# Patient Record
Sex: FEMALE | Race: WHITE | NOT HISPANIC OR LATINO | Employment: FULL TIME | URBAN - NONMETROPOLITAN AREA
[De-identification: names, ages, dates, MRNs, and addresses within clinical notes are randomized per-mention and may not be internally consistent; named-entity substitution may affect disease eponyms.]

---
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
## 2022-01-11 ENCOUNTER — OFFICE VISIT (OUTPATIENT)
Dept: FAMILY MEDICINE | Facility: OTHER | Age: 22
End: 2022-01-11
Attending: FAMILY MEDICINE
Payer: COMMERCIAL

## 2022-01-11 VITALS
WEIGHT: 171.4 LBS | SYSTOLIC BLOOD PRESSURE: 124 MMHG | OXYGEN SATURATION: 99 % | HEART RATE: 81 BPM | TEMPERATURE: 98.4 F | DIASTOLIC BLOOD PRESSURE: 80 MMHG | RESPIRATION RATE: 16 BRPM | BODY MASS INDEX: 28.74 KG/M2

## 2022-01-11 DIAGNOSIS — L65.9 HAIR LOSS: Primary | ICD-10-CM

## 2022-01-11 DIAGNOSIS — R06.2 WHEEZING: ICD-10-CM

## 2022-01-11 LAB
FERRITIN SERPL-MCNC: 51 NG/ML (ref 24–336)
TSH SERPL DL<=0.005 MIU/L-ACNC: 1.42 MU/L (ref 0.4–4)

## 2022-01-11 PROCEDURE — 36415 COLL VENOUS BLD VENIPUNCTURE: CPT | Mod: ZL | Performed by: FAMILY MEDICINE

## 2022-01-11 PROCEDURE — 99213 OFFICE O/P EST LOW 20 MIN: CPT | Performed by: FAMILY MEDICINE

## 2022-01-11 PROCEDURE — 82728 ASSAY OF FERRITIN: CPT | Mod: ZL | Performed by: FAMILY MEDICINE

## 2022-01-11 PROCEDURE — 84443 ASSAY THYROID STIM HORMONE: CPT | Mod: ZL | Performed by: FAMILY MEDICINE

## 2022-01-11 RX ORDER — FLUTICASONE PROPIONATE 110 UG/1
1 AEROSOL, METERED RESPIRATORY (INHALATION) 2 TIMES DAILY
Qty: 12 G | Refills: 0 | Status: SHIPPED | OUTPATIENT
Start: 2022-01-11 | End: 2022-10-24

## 2022-01-11 ASSESSMENT — ANXIETY QUESTIONNAIRES
7. FEELING AFRAID AS IF SOMETHING AWFUL MIGHT HAPPEN: NOT AT ALL
GAD7 TOTAL SCORE: 11
GAD7 TOTAL SCORE: 11
7. FEELING AFRAID AS IF SOMETHING AWFUL MIGHT HAPPEN: NOT AT ALL
1. FEELING NERVOUS, ANXIOUS, OR ON EDGE: NEARLY EVERY DAY
3. WORRYING TOO MUCH ABOUT DIFFERENT THINGS: NEARLY EVERY DAY
2. NOT BEING ABLE TO STOP OR CONTROL WORRYING: NEARLY EVERY DAY
4. TROUBLE RELAXING: NOT AT ALL
5. BEING SO RESTLESS THAT IT IS HARD TO SIT STILL: NOT AT ALL
6. BECOMING EASILY ANNOYED OR IRRITABLE: MORE THAN HALF THE DAYS
GAD7 TOTAL SCORE: 11

## 2022-01-11 ASSESSMENT — PATIENT HEALTH QUESTIONNAIRE - PHQ9
10. IF YOU CHECKED OFF ANY PROBLEMS, HOW DIFFICULT HAVE THESE PROBLEMS MADE IT FOR YOU TO DO YOUR WORK, TAKE CARE OF THINGS AT HOME, OR GET ALONG WITH OTHER PEOPLE: SOMEWHAT DIFFICULT
SUM OF ALL RESPONSES TO PHQ QUESTIONS 1-9: 11
SUM OF ALL RESPONSES TO PHQ QUESTIONS 1-9: 11

## 2022-01-11 ASSESSMENT — PAIN SCALES - GENERAL: PAINLEVEL: NO PAIN (0)

## 2022-01-11 NOTE — LETTER
January 11, 2022      Lyn Ny  [id number] [address] [address]        To Whom It May Concern:    Lyn Ny was seen in our clinic. Patient had influenza-like illness on 1/3-1/10.    She was not tested for covid as she is within 90 day window of positive covid test.    She may return to work without restrictions now that she is asymptomatic.      Sincerely,        Tonya Dobbs MD

## 2022-01-11 NOTE — PROGRESS NOTES
SUBJECTIVE:   Lyn Ny is a 21 year old female who presents to clinic today for the following health issues:  Nursing Notes:   Kayleen Irving LPN  1/11/2022  3:20 PM  Signed  Patient is needing a notes to return to work, states was sick last week. She also states her inhaler doesn't seem to be working. Feeling her hair has been falling out, would like to discuss thyroid. states for a couple months has been feeling nauseous, and vomiting at times.      Patient's last menstrual period was 12/27/2021 (approximate).  Medication Reconciliation: complete    FOOD SECURITY SCREENING QUESTIONS  Hunger Vital Signs:  Within the past 12 months we worried whether our food would run out before we got money to buy more. Never  Within the past 12 months the food we bought just didn't last and we didn't have money to get more. Never  Kayleen Irving LPN 1/11/2022 2:49 PM       Advance care directive on file? no  Advance care directive provided to patient? no       Kayleen Irving LPN        HPI  22 yo female presents with a few concerns.    Last week had fever, chills, n/v and diarrhea. Symptoms have resolved.    Covid positive on 11/19./2021, recovered but does not think her albuterol is working anymore    Hair loss and fatigue. Thought it was related to stress.     Patient is unvaccinated and does not plan to get a vaccine in the near future.        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       OBJECTIVE:     /80   Pulse 81   Temp 98.4  F (36.9  C) (Tympanic)   Resp 16   Wt 77.7 kg (171 lb 6.4 oz)   LMP 12/27/2021 (Approximate)   SpO2 99%    Breastfeeding No   BMI 28.74 kg/m    Body mass index is 28.74 kg/m .  Physical Exam  Cardiovascular:      Rate and Rhythm: Normal rate.   Pulmonary:      Effort: Pulmonary effort is normal. No respiratory distress.   Musculoskeletal:      Cervical back: Normal range of motion.   Skin:     General: Skin is warm.      Findings: No rash.   Neurological:      Mental Status: She is alert.         Diagnostic Test Results:  Results for orders placed or performed in visit on 01/11/22   Ferritin     Status: Normal   Result Value Ref Range    Ferritin 51 24 - 336 ng/mL   TSH Reflex GH     Status: Normal   Result Value Ref Range    TSH 1.42 0.40 - 4.00 mU/L       ASSESSMENT/PLAN:         (L65.9) Hair loss  (primary encounter diagnosis)  Comment: May be hormonal.  Ferritin and TSH are normal.  Plan: Ferritin, TSH Reflex GH            (R06.2) Wheezing  Comment:   Plan: fluticasone (FLOVENT HFA) 110 MCG/ACT inhaler        Patient misunderstood instructions on proper use of inhaled steroids.  Recommend resuming Flovent 1 puff twice daily.  This would be a chronic medication that she uses seasonally or at the onset of respiratory illnesses.  May continue albuterol as needed.    Strongly encouraged vaccination which she declines        Tonya Dobbs MD  [facility]  Answers for HPI/ROS submitted by the patient on 1/11/2022  If you checked off any problems, how difficult have these problems made it for you to do your work, take care of things at home, or get along with other people?: Somewhat difficult  PHQ9 TOTAL SCORE: 11  RAHAT 7 TOTAL SCORE: 11

## 2022-01-11 NOTE — NURSING NOTE
Patient is needing a notes to return to work, states was sick last week. She also states her inhaler doesn't seem to be working. Feeling her hair has been falling out, would like to discuss thyroid. states for a couple months has been feeling nauseous, and vomiting at times.      Patient's last menstrual period was 12/27/2021 (approximate).  Medication Reconciliation: complete    FOOD SECURITY SCREENING QUESTIONS  Hunger Vital Signs:  Within the past 12 months we worried whether our food would run out before we got money to buy more. Never  Within the past 12 months the food we bought just didn't last and we didn't have money to get more. Never  Kayleen Irving LPN 1/11/2022 2:49 PM       Advance care directive on file? no  Advance care directive provided to patient? no       Kayleen Irving LPN

## 2022-01-12 ASSESSMENT — ANXIETY QUESTIONNAIRES: GAD7 TOTAL SCORE: 11

## 2022-01-12 ASSESSMENT — PATIENT HEALTH QUESTIONNAIRE - PHQ9: SUM OF ALL RESPONSES TO PHQ QUESTIONS 1-9: 11

## 2022-01-25 ENCOUNTER — TELEPHONE (OUTPATIENT)
Dept: FAMILY MEDICINE | Facility: OTHER | Age: 22
End: 2022-01-25
Payer: COMMERCIAL

## 2022-01-25 DIAGNOSIS — L65.9 HAIR LOSS: Primary | ICD-10-CM

## 2022-01-25 NOTE — TELEPHONE ENCOUNTER
Pt would like to get a referral to [facility] dermatologist.  Please call.    Yovanny Young on 1/25/2022 at 2:40 PM

## 2022-01-25 NOTE — TELEPHONE ENCOUNTER
Called and verified patient full name and . Patient would like referral to [facility] for her hair loss.     Ronel Conner LPN on 2022 at 2:57 PM

## 2022-07-08 ENCOUNTER — ALLIED HEALTH/NURSE VISIT (OUTPATIENT)
Dept: FAMILY MEDICINE | Facility: OTHER | Age: 22
End: 2022-07-08
Attending: FAMILY MEDICINE
Payer: COMMERCIAL

## 2022-07-08 DIAGNOSIS — J02.9 SORE THROAT: Primary | ICD-10-CM

## 2022-07-08 DIAGNOSIS — R05.9 COUGH: ICD-10-CM

## 2022-07-08 DIAGNOSIS — Z20.822 EXPOSURE TO 2019 NOVEL CORONAVIRUS: ICD-10-CM

## 2022-07-08 DIAGNOSIS — R51.9 HEADACHE, UNSPECIFIED: ICD-10-CM

## 2022-07-08 DIAGNOSIS — R11.0 NAUSEA: ICD-10-CM

## 2022-07-08 PROCEDURE — U0005 INFEC AGEN DETEC AMPLI PROBE: HCPCS | Mod: ZL

## 2022-07-08 PROCEDURE — C9803 HOPD COVID-19 SPEC COLLECT: HCPCS

## 2022-07-08 NOTE — PROGRESS NOTES
Patient here for Covid Testing. Exposure, cough, headache, nausea and sore throat.     Willow Parr MA on 7/8/2022 at 8:31 AM

## 2022-07-09 LAB — SARS-COV-2 RNA RESP QL NAA+PROBE: NEGATIVE

## 2022-09-17 ENCOUNTER — HEALTH MAINTENANCE LETTER (OUTPATIENT)
Age: 22
End: 2022-09-17

## 2022-10-24 ENCOUNTER — OFFICE VISIT (OUTPATIENT)
Dept: FAMILY MEDICINE | Facility: OTHER | Age: 22
End: 2022-10-24
Attending: FAMILY MEDICINE
Payer: COMMERCIAL

## 2022-10-24 VITALS
TEMPERATURE: 98 F | SYSTOLIC BLOOD PRESSURE: 118 MMHG | WEIGHT: 193.4 LBS | DIASTOLIC BLOOD PRESSURE: 80 MMHG | OXYGEN SATURATION: 96 % | RESPIRATION RATE: 16 BRPM | HEART RATE: 80 BPM | HEIGHT: 64 IN | BODY MASS INDEX: 33.02 KG/M2

## 2022-10-24 DIAGNOSIS — F41.9 ANXIETY AND DEPRESSION: ICD-10-CM

## 2022-10-24 DIAGNOSIS — Z00.00 HEALTH CARE MAINTENANCE: Primary | ICD-10-CM

## 2022-10-24 DIAGNOSIS — Z13.220 SCREENING FOR LIPID DISORDERS: ICD-10-CM

## 2022-10-24 DIAGNOSIS — Z23 NEED FOR HPV VACCINATION: ICD-10-CM

## 2022-10-24 DIAGNOSIS — Z11.4 SCREENING FOR HIV (HUMAN IMMUNODEFICIENCY VIRUS): ICD-10-CM

## 2022-10-24 DIAGNOSIS — Z30.011 ENCOUNTER FOR INITIAL PRESCRIPTION OF CONTRACEPTIVE PILLS: ICD-10-CM

## 2022-10-24 DIAGNOSIS — Z11.59 NEED FOR HEPATITIS C SCREENING TEST: ICD-10-CM

## 2022-10-24 DIAGNOSIS — Z11.8 SPECIAL SCREENING EXAMINATION FOR CHLAMYDIAL DISEASE: ICD-10-CM

## 2022-10-24 DIAGNOSIS — F32.A ANXIETY AND DEPRESSION: ICD-10-CM

## 2022-10-24 DIAGNOSIS — J45.30 MILD PERSISTENT ASTHMA WITHOUT COMPLICATION: ICD-10-CM

## 2022-10-24 LAB
C TRACH DNA SPEC QL PROBE+SIG AMP: NEGATIVE
CHOLEST SERPL-MCNC: 220 MG/DL
FASTING STATUS PATIENT QL REPORTED: ABNORMAL
HDLC SERPL-MCNC: 49 MG/DL (ref 23–92)
LDLC SERPL CALC-MCNC: 141 MG/DL
N GONORRHOEA DNA SPEC QL NAA+PROBE: NEGATIVE
NONHDLC SERPL-MCNC: 171 MG/DL
TRIGL SERPL-MCNC: 150 MG/DL

## 2022-10-24 PROCEDURE — 87491 CHLMYD TRACH DNA AMP PROBE: CPT | Mod: ZL | Performed by: FAMILY MEDICINE

## 2022-10-24 PROCEDURE — 90651 9VHPV VACCINE 2/3 DOSE IM: CPT | Performed by: FAMILY MEDICINE

## 2022-10-24 PROCEDURE — 80061 LIPID PANEL: CPT | Mod: ZL | Performed by: FAMILY MEDICINE

## 2022-10-24 PROCEDURE — 90471 IMMUNIZATION ADMIN: CPT | Performed by: FAMILY MEDICINE

## 2022-10-24 PROCEDURE — 87591 N.GONORRHOEAE DNA AMP PROB: CPT | Mod: ZL | Performed by: FAMILY MEDICINE

## 2022-10-24 PROCEDURE — 86803 HEPATITIS C AB TEST: CPT | Mod: ZL | Performed by: FAMILY MEDICINE

## 2022-10-24 PROCEDURE — 36415 COLL VENOUS BLD VENIPUNCTURE: CPT | Mod: ZL | Performed by: FAMILY MEDICINE

## 2022-10-24 PROCEDURE — 87389 HIV-1 AG W/HIV-1&-2 AB AG IA: CPT | Mod: ZL | Performed by: FAMILY MEDICINE

## 2022-10-24 PROCEDURE — 99395 PREV VISIT EST AGE 18-39: CPT | Mod: 25 | Performed by: FAMILY MEDICINE

## 2022-10-24 RX ORDER — FLUOXETINE 10 MG/1
10 CAPSULE ORAL DAILY
Qty: 7 CAPSULE | Refills: 0 | Status: SHIPPED | OUTPATIENT
Start: 2022-10-24 | End: 2022-11-08

## 2022-10-24 ASSESSMENT — ENCOUNTER SYMPTOMS
ABDOMINAL PAIN: 0
NERVOUS/ANXIOUS: 1
PALPITATIONS: 0
PARESTHESIAS: 0
FREQUENCY: 0
WEAKNESS: 0
JOINT SWELLING: 0
HEMATURIA: 0
EYE PAIN: 0
CHILLS: 0
DIZZINESS: 0
HEMATOCHEZIA: 0
NAUSEA: 1
MYALGIAS: 0
DIARRHEA: 0
CONSTIPATION: 0
HEADACHES: 1
SORE THROAT: 0
BREAST MASS: 0
DYSURIA: 0
FEVER: 0
ARTHRALGIAS: 0
HEARTBURN: 0
COUGH: 0
SHORTNESS OF BREATH: 0

## 2022-10-24 ASSESSMENT — ANXIETY QUESTIONNAIRES
6. BECOMING EASILY ANNOYED OR IRRITABLE: NEARLY EVERY DAY
7. FEELING AFRAID AS IF SOMETHING AWFUL MIGHT HAPPEN: MORE THAN HALF THE DAYS
1. FEELING NERVOUS, ANXIOUS, OR ON EDGE: MORE THAN HALF THE DAYS
GAD7 TOTAL SCORE: 16
3. WORRYING TOO MUCH ABOUT DIFFERENT THINGS: NEARLY EVERY DAY
5. BEING SO RESTLESS THAT IT IS HARD TO SIT STILL: SEVERAL DAYS
2. NOT BEING ABLE TO STOP OR CONTROL WORRYING: NEARLY EVERY DAY
IF YOU CHECKED OFF ANY PROBLEMS ON THIS QUESTIONNAIRE, HOW DIFFICULT HAVE THESE PROBLEMS MADE IT FOR YOU TO DO YOUR WORK, TAKE CARE OF THINGS AT HOME, OR GET ALONG WITH OTHER PEOPLE: VERY DIFFICULT
GAD7 TOTAL SCORE: 16

## 2022-10-24 ASSESSMENT — ASTHMA QUESTIONNAIRES: ACT_TOTALSCORE: 19

## 2022-10-24 ASSESSMENT — PATIENT HEALTH QUESTIONNAIRE - PHQ9
5. POOR APPETITE OR OVEREATING: MORE THAN HALF THE DAYS
SUM OF ALL RESPONSES TO PHQ QUESTIONS 1-9: 12

## 2022-10-24 ASSESSMENT — PAIN SCALES - GENERAL: PAINLEVEL: NO PAIN (0)

## 2022-10-24 NOTE — LETTER
My Asthma Action Plan    Name: Lyn Ny   YOB: 2000  Date: 10/24/2022   My doctor: Narcisa Steve MD   My clinic: [facility] in [place]        My Rescue Medicine:   Albuterol inhaler (Proair/Ventolin/Proventil HFA)  2-4 puffs EVERY 4 HOURS as needed. Use a spacer if recommended by your provider.   My Asthma Severity:   Intermittent / Exercise Induced  Know your asthma triggers:              GREEN ZONE   Good Control    I feel good    No cough or wheeze    Can work, sleep and play without asthma symptoms       Take your asthma control medicine every day.     1. If exercise triggers your asthma, take your rescue medication    15 minutes before exercise or sports, and    During exercise if you have asthma symptoms  2. Spacer to use with inhaler: If you have a spacer, make sure to use it with your inhaler             YELLOW ZONE Getting Worse  I have ANY of these:    I do not feel good    Cough or wheeze    Chest feels tight    Wake up at night   1. Keep taking your Green Zone medications  2. Start taking your rescue medicine:    every 20 minutes for up to 1 hour. Then every 4 hours for 24-48 hours.  3. If you stay in the Yellow Zone for more than 12-24 hours, contact your doctor.  4. If you do not return to the Green Zone in 12-24 hours or you get worse, start taking your oral steroid medicine if prescribed by your provider.           RED ZONE Medical Alert - Get Help  I have ANY of these:    I feel awful    Medicine is not helping    Breathing getting harder    Trouble walking or talking    Nose opens wide to breathe       1. Take your rescue medicine NOW  2. If your provider has prescribed an oral steroid medicine, start taking it NOW  3. Call your doctor NOW  4. If you are still in the Red Zone after 20 minutes and you have not reached your doctor:    Take your rescue medicine again and    Call 911 or go to the emergency room right away    See your regular doctor within 2  weeks of an Emergency Room or Urgent Care visit for follow-up treatment.          Annual Reminders:  Meet with Asthma Educator,  Flu Shot in the Fall, consider Pneumonia Vaccination for patients with asthma (aged 19 and older).    Pharmacy: [facility] [id number] [place] [address]    Electronically signed by Narcisa Steve MD   Date: 10/24/22                    Asthma Triggers  How To Control Things That Make Your Asthma Worse    Triggers are things that make your asthma worse.  Look at the list below to help you find your triggers and   what you can do about them. You can help prevent asthma flare-ups by staying away from your triggers.      Trigger                                                          What you can do   Cigarette Smoke  Tobacco smoke can make asthma worse. Do not allow smoking in your home, car or around you.  Be sure no one smokes at a child s day care or school.  If you smoke, ask your health care provider for ways to help you quit.  Ask family members to quit too.  Ask your health care provider for a referral to Quit Plan to help you quit smoking, or call 2-818-495-PLAN.     Colds, Flu, Bronchitis  These are common triggers of asthma. Wash your hands often.  Don t touch your eyes, nose or mouth.  Get a flu shot every year.     Dust Mites  These are tiny bugs that live in cloth or carpet. They are too small to see. Wash sheets and blankets in hot water every week.   Encase pillows and mattress in dust mite proof covers.  Avoid having carpet if you can. If you have carpet, vacuum weekly.   Use a dust mask and HEPA vacuum.   Pollen and Outdoor Mold  Some people are allergic to trees, grass, or weed pollen, or molds. Try to keep your windows closed.  Limit time out doors when pollen count is high.   Ask you health care provider about taking medicine during allergy season.     Animal Dander  Some people are allergic to skin flakes, urine or saliva from pets with fur or  feathers. Keep pets with fur or feathers out of your home.    If you can t keep the pet outdoors, then keep the pet out of your bedroom.  Keep the bedroom door closed.  Keep pets off cloth furniture and away from stuffed toys.     Mice, Rats, and Cockroaches  Some people are allergic to the waste from these pests.   Cover food and garbage.  Clean up spills and food crumbs.  Store grease in the refrigerator.   Keep food out of the bedroom.   Indoor Mold  This can be a trigger if your home has high moisture. Fix leaking faucets, pipes, or other sources of water.   Clean moldy surfaces.  Dehumidify basement if it is damp and smelly.   Smoke, Strong Odors, and Sprays  These can reduce air quality. Stay away from strong odors and sprays, such as perfume, powder, hair spray, paints, smoke incense, paint, cleaning products, candles and new carpet.   Exercise or Sports  Some people with asthma have this trigger. Be active!  Ask your doctor about taking medicine before sports or exercise to prevent symptoms.    Warm up for 5-10 minutes before and after sports or exercise.     Other Triggers of Asthma  Cold air:  Cover your nose and mouth with a scarf.  Sometimes laughing or crying can be a trigger.  Some medicines and food can trigger asthma.

## 2022-10-24 NOTE — PROGRESS NOTES
Nursing Notes:   Ronel Conner LPN  10/24/2022  8:52 AM  Sign at exiting of workspace  Chief Complaint   Patient presents with     Physical     Is fasting. No questions or concerns.     Medication Reconciliation: complete    Ronel Conner LPN       SUBJECTIVE:   CC: Lyn is an 22 year old who presents for preventive health visit.     Patient has been advised of split billing requirements and indicates understanding: Yes     Healthy Habits:     Getting at least 3 servings of Calcium per day:  Yes    Bi-annual eye exam:  Yes    Dental care twice a year:  Yes    Sleep apnea or symptoms of sleep apnea:  None    Diet:  Regular (no restrictions)    Frequency of exercise:  None    Taking medications regularly:  Yes    Medication side effects:  None    PHQ-2 Total Score: 2    Additional concerns today:  Yes     ASTHMA HISTORY    Frequency of daytime symptoms:  Twice a week.  Frequency of nighttime symptoms:  0, needs to take albuterol before bed.  How many albuterol puffs per week: 14.  Takes albuterol before bed every night.    How often are you missing out on activities:  Never.  Triggers:  Allergies, dust, smoke.  How do you try to avoid triggers: avoid outside activities when allergies are worse and avoids smoke.  Medications:  alb   Inhaler technique:  good    Current control:  She feels it is well controlled.    Barriers to treatment:  none  ER visits/Hospitalizations in past year?  no  Recent need for steroid treatment?  no  Asthma Action Plan up to date?  Yes, 10/24/22   Tobacco use?   no  Interested in cessation?  n/a    ACT Total Scores 10/24/2022   ACT TOTAL SCORE (Goal Greater than or Equal to 20) 19   In the past 12 months, how many times did you visit the emergency room for your asthma without being admitted to the hospital? 0   In the past 12 months, how many times were you hospitalized overnight because of your asthma? 0       ARHAT-7 SCORE 1/11/2022 10/24/2022   Total Score 11 (moderate anxiety) -    Total Score 11 16       PHQ 8/12/2019 1/11/2022 10/24/2022   PHQ-9 Total Score - 11 12   Q9: Thoughts of better off dead/self-harm past 2 weeks - Not at all Not at all   PHQ-A Total Score 0 - -   PHQ-A Depressed most days in past year No - -   PHQ-A Mood affect on daily activities Not difficult at all - -   PHQ-A Suicide Ideation past 2 weeks Not at all - -   PHQ-A Suicide Ideation past month No - -   PHQ-A Previous suicide attempt No - -       She needs a refill of her oral contraceptives.      She also has been having more difficulty with anxiety and depression for the past couple of months.  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  She is more irritable than normal, more anxious.    Depression and Anxiety Follow-Up    How are you doing with your depression since your last visit? Worsened     How are you doing with your anxiety since your last visit?  Worsened     Are you having other symptoms that might be associated with depression or anxiety? Yes    Have you had a significant life event? No     Do you have any concerns with your use of alcohol or other drugs? No     Denies suicidal ideation.    Social History     Tobacco Use     Smoking status: Never     Smokeless tobacco: Never     Tobacco comments:     Quit smoking: Mom smokes outside   Vaping Use     Vaping Use: Never used   Substance Use Topics     Alcohol use: No     Drug use: Never     Comment: Drug use: No     PHQ 8/12/2019 1/11/2022 10/24/2022   PHQ-9 Total Score - 11 12   Q9: Thoughts of better off dead/self-harm past 2 weeks - Not at all Not at all   PHQ-A Total Score 0 - -   PHQ-A Depressed most days in past year No - -   PHQ-A Mood affect on daily activities Not difficult at all - -   PHQ-A Suicide Ideation past 2 weeks Not at all - -   PHQ-A Suicide Ideation past month No - -   PHQ-A Previous suicide attempt No - -     RAHAT-7 SCORE 1/11/2022 10/24/2022   Total Score 11 (moderate anxiety) -    Total Score 11 16         Suicide Assessment Five-step Evaluation and Treatment (SAFE-T)      Today's PHQ-2 Score:   PHQ-2 ( 1999 Pf[employer]) 10/24/2022   Q1: Little interest or pleasure in doing things 1   Q2: Feeling down, depressed or hopeless 1   PHQ-2 Score 2   PHQ-2 Total Score (12-17 Years)- Positive if 3 or more points; Administer PHQ-A if positive -   Q1: Little interest or pleasure in doing things Several days   Q2: Feeling down, depressed or hopeless Several days   PHQ-2 Score 2       Abuse: Current or Past (Physical, Sexual or Emotional) - No  Do you feel safe in your environment? Yes    Have you ever done Advance Care Planning? (For example, a Health Directive, POLST, or a discussion with a medical provider or your loved ones about your wishes): No, advance care planning information given to patient to review.  Patient declined advance care planning discussion at this time.    Social History     Tobacco Use     Smoking status: Never     Smokeless tobacco: Never     Tobacco comments:     Quit smoking: Mom smokes outside   Substance Use Topics     Alcohol use: No     If you drink alcohol do you typically have >3 drinks per day or >7 drinks per week? No    Alcohol Use 10/24/2022   Prescreen: >3 drinks/day or >7 drinks/week? Not Applicable   Prescreen: >3 drinks/day or >7 drinks/week? -       Reviewed orders with patient.  Reviewed health maintenance and updated orders accordingly - Yes  BP Readings from Last 3 Encounters:   10/24/22 118/80   01/11/22 124/80   09/10/21 114/76    Wt Readings from Last 3 Encounters:   10/24/22 87.7 kg (193 lb 6.4 oz)   01/11/22 77.7 kg (171 lb 6.4 oz) (93 %, Z= 1.44)*   09/10/21 84.4 kg (186 lb) (96 %, Z= 1.74)*     * Growth percentiles are based on CDC (Girls, 2-20 Years) data.                  Patient Active Problem List   Diagnosis     Acne     Menorrhagia with irregular cycle     Spondylolysis of lumbar region     Mild persistent asthma without complication     Past  Surgical History:   Procedure Laterality Date     Lumbar fusion of L5  8/2/3017    subsequent infection       Social History     Tobacco Use     Smoking status: Never     Smokeless tobacco: Never     Tobacco comments:     Quit smoking: Mom smokes outside   Substance Use Topics     Alcohol use: No     Family History   Problem Relation Age of Onset     Hypertension Mother         Hypertension,Borderline     Hypertension Father         Hypertension     Hyperlipidemia Father         Hyperlipidemia     Hypertension Maternal Grandmother         Hypertension     Hypertension Maternal Grandfather         Hypertension     Hypertension Paternal Grandfather         Hypertension     Heart Disease Paternal Grandfather         Heart Disease,smoker         Current Outpatient Medications   Medication Sig Dispense Refill     acetaminophen (TYLENOL) 325 MG tablet Take 650 mg by mouth       albuterol (PROAIR HFA/PROVENTIL HFA/VENTOLIN HFA) 108 (90 Base) MCG/ACT inhaler INHALE 2 PUFFS INTO THE LUNGS EVERY 4 HOURS AS NEEDED FOR SHORTNESS OF BREATH / DYSPNEA OR WHEEZING 18 g 11     FLUoxetine (PROZAC) 10 MG capsule Take 1 capsule (10 mg) by mouth daily for 7 days , then increase to 20 mg daily. 7 capsule 0     FLUoxetine (PROZAC) 20 MG capsule Take 1 capsule (20 mg) by mouth daily 90 capsule 3     norgestrel-ethinyl estradiol (LO/OVRAL) 0.3-30 MG-MCG tablet Take 1 tablet by mouth daily 84 tablet 4     Allergies   Allergen Reactions     Azithromycin Other (See Comments)     Numbness in the right side, neurological changes.      Amoxicillin Nausea and Vomiting     Tolerates cephalexin     Rifampin Nausea and Vomiting     Hydrocodone Nausea     Vomiting       Breast Cancer Screening:        History of abnormal Pap smear: NO - under age 21, PAP not appropriate for age     Reviewed and updated as needed this visit by clinical staff   Tobacco  Allergies  Meds              Reviewed and updated as needed this visit by Provider                  Past Medical History:   Diagnosis Date     Spondylolysis of lumbar region     No Comments Provided      Past Surgical History:   Procedure Laterality Date     Lumbar fusion of L5  8/2/3017    subsequent infection       Review of Systems   Constitutional: Negative for chills and fever.   HENT: Negative for congestion, ear pain, hearing loss and sore throat.    Eyes: Negative for pain and visual disturbance.   Respiratory: Negative for cough and shortness of breath.    Cardiovascular: Negative for chest pain, palpitations and peripheral edema.   Gastrointestinal: Positive for nausea. Negative for abdominal pain, constipation, diarrhea, heartburn and hematochezia.   Breasts:  Positive for tenderness. Negative for breast mass and discharge.   Genitourinary: Positive for vaginal discharge. Negative for dysuria, frequency, genital sores, hematuria, pelvic pain, urgency and vaginal bleeding.   Musculoskeletal: Negative for arthralgias, joint swelling and myalgias.   Skin: Negative for rash.   Neurological: Positive for headaches. Negative for dizziness, weakness and paresthesias.   Psychiatric/Behavioral: Positive for mood changes. The patient is nervous/anxious.      CONSTITUTIONAL: NEGATIVE for fever, chills, change in weight  INTEGUMENTARU/SKIN: NEGATIVE for worrisome rashes, moles or lesions  EYES: NEGATIVE for vision changes or irritation  ENT: NEGATIVE for ear, mouth and throat problems  RESP: NEGATIVE for significant cough or SOB  BREAST: NEGATIVE for masses, tenderness or discharge  CV: NEGATIVE for chest pain, palpitations or peripheral edema  GI: NEGATIVE for nausea, abdominal pain, heartburn, or change in bowel habits  : NEGATIVE for unusual urinary or vaginal symptoms. Periods are regular.  MUSCULOSKELETAL: NEGATIVE for significant arthralgias or myalgia  NEURO: NEGATIVE for weakness, dizziness or paresthesias  PSYCHIATRIC: NEGATIVE for changes in mood or affect     OBJECTIVE:   /80   Pulse 80    "Temp 98  F (36.7  C) (Tympanic)   Resp 16   Ht 1.626 m (5' 4\")   Wt 87.7 kg (193 lb 6.4 oz)   LMP 10/10/2022 (Approximate)   SpO2 96%   Breastfeeding No   BMI 33.20 kg/m    Physical Exam  Constitutional:       General: She is not in acute distress.     Appearance: She is well-developed.   HENT:      Head: Normocephalic.      Right Ear: Tympanic membrane and external ear normal.      Left Ear: Tympanic membrane and external ear normal.      Nose: Nose normal.      Mouth/Throat:      Pharynx: No oropharyngeal exudate.   Eyes:      General:         Right eye: No discharge.         Left eye: No discharge.      Conjunctiva/sclera: Conjunctivae normal.      Pupils: Pupils are equal, round, and reactive to light.   Neck:      Thyroid: No thyromegaly.      Trachea: No tracheal deviation.   Cardiovascular:      Rate and Rhythm: Normal rate and regular rhythm.      Pulses: Normal pulses.      Heart sounds: Normal heart sounds, S1 normal and S2 normal. No murmur heard.    No friction rub. No gallop. No S3 or S4 sounds.   Pulmonary:      Effort: Pulmonary effort is normal. No respiratory distress.      Breath sounds: Normal breath sounds. No wheezing or rales.      Comments: Breast exam:  No masses palpable bilaterally.  No skin changes, tethering or axillary lymphadenopathy bilaterally.    Abdominal:      General: Bowel sounds are normal. There is no distension.      Palpations: Abdomen is soft. There is no mass.      Tenderness: There is no abdominal tenderness.   Genitourinary:     Comments: Pelvic/Rectal exams deferred per patient.  Musculoskeletal:         General: Normal range of motion.      Cervical back: Neck supple.   Lymphadenopathy:      Cervical: No cervical adenopathy.   Skin:     General: Skin is warm and dry.      Findings: No rash.   Neurological:      Mental Status: She is alert and oriented to person, place, and time.      Motor: No abnormal muscle tone.      Deep Tendon Reflexes: Reflexes are normal " and symmetric.   Psychiatric:         Behavior: Behavior normal.         Thought Content: Thought content normal.         Judgment: Judgment normal.           ASSESSMENT/PLAN:       ICD-10-CM    1. Health care maintenance  Z00.00       2. Need for HPV vaccination  Z23 GH IMM-  HUMAN PAPILLOMA VIRUS (GARDASIL 9) VACCINE      3. Special screening examination for chlamydial disease  Z11.8 GC/Chlamydia by PCR     GC/Chlamydia by PCR      4. Screening for lipid disorders  Z13.220 Lipid Profile     Lipid Profile      5. Screening for HIV (human immunodeficiency virus)  Z11.4 HIV Antigen Antibody Combo     HIV Antigen Antibody Combo      6. Need for hepatitis C screening test  Z11.59 Hepatitis C Screen Reflex to HCV RNA Quant and Genotype     Hepatitis C Screen Reflex to HCV RNA Quant and Genotype      7. Encounter for initial prescription of contraceptive pills  Z30.011 norgestrel-ethinyl estradiol (LO/OVRAL) 0.3-30 MG-MCG tablet      8. Mild persistent asthma without complication  J45.30       9. Anxiety and depression  F41.9 FLUoxetine (PROZAC) 10 MG capsule    F32.A FLUoxetine (PROZAC) 20 MG capsule        1.  Gonorrhea/Chlamydia screening as well as HIV and hepatitis C screening completed today.  Tdap is up to date, last completed 8/8/14.  HPV vaccine completed today.  Flu and covid vaccines declined by patient.  2.  See #1.  3.  See #1.  4.  Lipid profile completed as well.  5.  See #1.  6.  See #1.  7.  Oral contraceptives refilled.  8.  Stable.  On albuterol only.  She feels that she is doing ok with just this and declines addition of any inhaled steroids at this time.  9.  Start on Fluoxetine 10 mg daily x 7 days, then increase to 20 mg daily.  Follow up in 1 month for follow up.    Patient has been advised of split billing requirements and indicates understanding: Yes      COUNSELING:  Reviewed preventive health counseling, as reflected in patient instructions       Regular exercise       Healthy diet/nutrition    "    Vision screening       Contraception       Safe sex practices/STD prevention       Consider Hep C screening for all patients one time for ages 18-79 years       HIV screeninx in teen years, 1x in adult years, and at intervals if high risk    Estimated body mass index is 33.2 kg/m  as calculated from the following:    Height as of this encounter: 1.626 m (5' 4\").    Weight as of this encounter: 87.7 kg (193 lb 6.4 oz).    Weight management plan: Discussed healthy diet and exercise guidelines    She reports that she has never smoked. She has never used smokeless tobacco.      Counseling Resources:  ATP IV Guidelines  Pooled Cohorts Equation Calculator  Breast Cancer Risk Calculator  BRCA-Related Cancer Risk Assessment: FHS-7 Tool  FRAX Risk Assessment  ICSI Preventive Guidelines  Dietary Guidelines for Americans, 2010  USDA's MyPlate  ASA Prophylaxis  Lung CA Screening    Narcisa Steve MD  [facility]  "

## 2022-10-24 NOTE — NURSING NOTE
Chief Complaint   Patient presents with     Physical     Is fasting. No questions or concerns.     Medication Reconciliation: complete    Ronel Conner LPN

## 2022-10-25 LAB
HCV AB SERPL QL IA: NONREACTIVE
HIV 1+2 AB+HIV1 P24 AG SERPL QL IA: NONREACTIVE

## 2022-11-08 ENCOUNTER — TELEPHONE (OUTPATIENT)
Dept: FAMILY MEDICINE | Facility: OTHER | Age: 22
End: 2022-11-08

## 2022-11-08 NOTE — TELEPHONE ENCOUNTER
Ok to stop fluoxetine.  I have a same day I could seen her in on Monday.  If she thinks she should be seen sooner, Joyce Bishop could see her in one of her same day appointments as well.  Narcisa Steve MD

## 2022-11-08 NOTE — TELEPHONE ENCOUNTER
CCA - Patient started new medication a few weeks ago.  She states it is making her feel worse an she does not think she can take it anymore.  Patient has a follow up scheduled for 11/21/2022, but she wanted to let you know that she cannot continue to take this medication until then.    Cathleen Ramirez on 11/8/2022 at 11:48 AM'

## 2022-11-09 ENCOUNTER — TELEPHONE (OUTPATIENT)
Dept: FAMILY MEDICINE | Facility: OTHER | Age: 22
End: 2022-11-09

## 2022-11-09 NOTE — TELEPHONE ENCOUNTER
Please call the patient.  She needs a note for work.  She missed a few days for being ill.      Nadine Back on 11/9/2022 at 9:39 AM

## 2022-11-09 NOTE — LETTER
November 10, 2022      Lyn Ny  [id number] [address]        To Whom It May Concern:    Lyn Ny was absent from work due to illness.  Please excuse for dates of 11/8, 11/9 and 11/10/22 due to illness.  She may return to work without restrictions.      Sincerely,        Narcisa Steve MD

## 2022-11-09 NOTE — TELEPHONE ENCOUNTER
Needs letter for work to states 11/8,11/9, 11/10 dates of missed work due to illness.   Current symptoms headache, Nausea & Vomiting, diarrhea, fatigue, dizziness, blurred vision. States when she tried to get out of bed she has to vomit.  Feels like it is due to a new medication that was given but work needs the info now.   Alina Dumont LPN ..........11/9/2022 4:37 PM

## 2022-11-10 ENCOUNTER — OFFICE VISIT (OUTPATIENT)
Dept: FAMILY MEDICINE | Facility: OTHER | Age: 22
End: 2022-11-10
Attending: PHYSICIAN ASSISTANT
Payer: COMMERCIAL

## 2022-11-10 VITALS
SYSTOLIC BLOOD PRESSURE: 118 MMHG | OXYGEN SATURATION: 95 % | DIASTOLIC BLOOD PRESSURE: 98 MMHG | TEMPERATURE: 96.5 F | HEART RATE: 95 BPM | BODY MASS INDEX: 32.44 KG/M2 | WEIGHT: 189 LBS | RESPIRATION RATE: 16 BRPM

## 2022-11-10 DIAGNOSIS — T50.905A MEDICATION REACTION, INITIAL ENCOUNTER: ICD-10-CM

## 2022-11-10 DIAGNOSIS — R11.2 NAUSEA AND VOMITING, UNSPECIFIED VOMITING TYPE: Primary | ICD-10-CM

## 2022-11-10 LAB
ALBUMIN UR-MCNC: NEGATIVE MG/DL
ANION GAP SERPL CALCULATED.3IONS-SCNC: 10 MMOL/L (ref 7–15)
APPEARANCE UR: CLEAR
BASOPHILS # BLD AUTO: 0.1 10E3/UL (ref 0–0.2)
BASOPHILS NFR BLD AUTO: 1 %
BILIRUB UR QL STRIP: NEGATIVE
BUN SERPL-MCNC: 12.1 MG/DL (ref 6–20)
CALCIUM SERPL-MCNC: 9.6 MG/DL (ref 8.6–10)
CHLORIDE SERPL-SCNC: 101 MMOL/L (ref 98–107)
COLOR UR AUTO: NORMAL
CREAT SERPL-MCNC: 0.71 MG/DL (ref 0.51–0.95)
DEPRECATED HCO3 PLAS-SCNC: 27 MMOL/L (ref 22–29)
EOSINOPHIL # BLD AUTO: 0.4 10E3/UL (ref 0–0.7)
EOSINOPHIL NFR BLD AUTO: 6 %
ERYTHROCYTE [DISTWIDTH] IN BLOOD BY AUTOMATED COUNT: 11.8 % (ref 10–15)
FLUAV RNA SPEC QL NAA+PROBE: NEGATIVE
FLUBV RNA RESP QL NAA+PROBE: NEGATIVE
GFR SERPL CREATININE-BSD FRML MDRD: >90 ML/MIN/1.73M2
GLUCOSE SERPL-MCNC: 83 MG/DL (ref 70–99)
GLUCOSE UR STRIP-MCNC: NEGATIVE MG/DL
HCG UR QL: NEGATIVE
HCT VFR BLD AUTO: 44.9 % (ref 35–47)
HGB BLD-MCNC: 15.5 G/DL (ref 11.7–15.7)
HGB UR QL STRIP: NEGATIVE
IMM GRANULOCYTES # BLD: 0 10E3/UL
IMM GRANULOCYTES NFR BLD: 0 %
KETONES UR STRIP-MCNC: NEGATIVE MG/DL
LEUKOCYTE ESTERASE UR QL STRIP: NEGATIVE
LYMPHOCYTES # BLD AUTO: 1.6 10E3/UL (ref 0.8–5.3)
LYMPHOCYTES NFR BLD AUTO: 26 %
MCH RBC QN AUTO: 30.3 PG (ref 26.5–33)
MCHC RBC AUTO-ENTMCNC: 34.5 G/DL (ref 31.5–36.5)
MCV RBC AUTO: 88 FL (ref 78–100)
MONOCYTES # BLD AUTO: 0.5 10E3/UL (ref 0–1.3)
MONOCYTES NFR BLD AUTO: 7 %
NEUTROPHILS # BLD AUTO: 3.8 10E3/UL (ref 1.6–8.3)
NEUTROPHILS NFR BLD AUTO: 60 %
NITRATE UR QL: NEGATIVE
NRBC # BLD AUTO: 0 10E3/UL
NRBC BLD AUTO-RTO: 0 /100
PH UR STRIP: 6.5 [PH] (ref 5–9)
PLATELET # BLD AUTO: 316 10E3/UL (ref 150–450)
POTASSIUM SERPL-SCNC: 4.2 MMOL/L (ref 3.4–5.3)
RBC # BLD AUTO: 5.11 10E6/UL (ref 3.8–5.2)
RSV RNA SPEC NAA+PROBE: NEGATIVE
SARS-COV-2 RNA RESP QL NAA+PROBE: NEGATIVE
SODIUM SERPL-SCNC: 138 MMOL/L (ref 136–145)
SP GR UR STRIP: 1.02 (ref 1–1.03)
UROBILINOGEN UR STRIP-MCNC: NORMAL MG/DL
WBC # BLD AUTO: 6.4 10E3/UL (ref 4–11)

## 2022-11-10 PROCEDURE — 36415 COLL VENOUS BLD VENIPUNCTURE: CPT | Mod: ZL | Performed by: PHYSICIAN ASSISTANT

## 2022-11-10 PROCEDURE — 250N000011 HC RX IP 250 OP 636: Performed by: PHYSICIAN ASSISTANT

## 2022-11-10 PROCEDURE — 85025 COMPLETE CBC W/AUTO DIFF WBC: CPT | Mod: ZL | Performed by: PHYSICIAN ASSISTANT

## 2022-11-10 PROCEDURE — C9803 HOPD COVID-19 SPEC COLLECT: HCPCS | Performed by: PHYSICIAN ASSISTANT

## 2022-11-10 PROCEDURE — 87637 SARSCOV2&INF A&B&RSV AMP PRB: CPT | Mod: ZL | Performed by: PHYSICIAN ASSISTANT

## 2022-11-10 PROCEDURE — 99214 OFFICE O/P EST MOD 30 MIN: CPT | Performed by: PHYSICIAN ASSISTANT

## 2022-11-10 PROCEDURE — 81003 URINALYSIS AUTO W/O SCOPE: CPT | Mod: ZL | Performed by: PHYSICIAN ASSISTANT

## 2022-11-10 PROCEDURE — 81025 URINE PREGNANCY TEST: CPT | Mod: ZL | Performed by: PHYSICIAN ASSISTANT

## 2022-11-10 PROCEDURE — 80048 BASIC METABOLIC PNL TOTAL CA: CPT | Mod: ZL | Performed by: PHYSICIAN ASSISTANT

## 2022-11-10 RX ORDER — ONDANSETRON 4 MG/1
4 TABLET, ORALLY DISINTEGRATING ORAL EVERY 6 HOURS PRN
Status: DISCONTINUED | OUTPATIENT
Start: 2022-11-10 | End: 2024-02-27

## 2022-11-10 RX ORDER — ONDANSETRON 4 MG/1
4 TABLET, ORALLY DISINTEGRATING ORAL EVERY 8 HOURS PRN
Qty: 30 TABLET | Refills: 0 | Status: SHIPPED | OUTPATIENT
Start: 2022-11-10 | End: 2024-07-01

## 2022-11-10 RX ADMIN — ONDANSETRON 4 MG: 4 TABLET, ORALLY DISINTEGRATING ORAL at 14:25

## 2022-11-10 ASSESSMENT — ANXIETY QUESTIONNAIRES
8. IF YOU CHECKED OFF ANY PROBLEMS, HOW DIFFICULT HAVE THESE MADE IT FOR YOU TO DO YOUR WORK, TAKE CARE OF THINGS AT HOME, OR GET ALONG WITH OTHER PEOPLE?: SOMEWHAT DIFFICULT
GAD7 TOTAL SCORE: 6
3. WORRYING TOO MUCH ABOUT DIFFERENT THINGS: SEVERAL DAYS
GAD7 TOTAL SCORE: 6
IF YOU CHECKED OFF ANY PROBLEMS ON THIS QUESTIONNAIRE, HOW DIFFICULT HAVE THESE PROBLEMS MADE IT FOR YOU TO DO YOUR WORK, TAKE CARE OF THINGS AT HOME, OR GET ALONG WITH OTHER PEOPLE: SOMEWHAT DIFFICULT
2. NOT BEING ABLE TO STOP OR CONTROL WORRYING: SEVERAL DAYS
1. FEELING NERVOUS, ANXIOUS, OR ON EDGE: SEVERAL DAYS
7. FEELING AFRAID AS IF SOMETHING AWFUL MIGHT HAPPEN: SEVERAL DAYS
8. IF YOU CHECKED OFF ANY PROBLEMS, HOW DIFFICULT HAVE THESE MADE IT FOR YOU TO DO YOUR WORK, TAKE CARE OF THINGS AT HOME, OR GET ALONG WITH OTHER PEOPLE?: SOMEWHAT DIFFICULT
IF YOU CHECKED OFF ANY PROBLEMS ON THIS QUESTIONNAIRE, HOW DIFFICULT HAVE THESE PROBLEMS MADE IT FOR YOU TO DO YOUR WORK, TAKE CARE OF THINGS AT HOME, OR GET ALONG WITH OTHER PEOPLE: SOMEWHAT DIFFICULT
4. TROUBLE RELAXING: NOT AT ALL
7. FEELING AFRAID AS IF SOMETHING AWFUL MIGHT HAPPEN: SEVERAL DAYS
6. BECOMING EASILY ANNOYED OR IRRITABLE: MORE THAN HALF THE DAYS
GAD7 TOTAL SCORE: 6
GAD7 TOTAL SCORE: 6
4. TROUBLE RELAXING: NOT AT ALL
GAD7 TOTAL SCORE: 6
5. BEING SO RESTLESS THAT IT IS HARD TO SIT STILL: NOT AT ALL
3. WORRYING TOO MUCH ABOUT DIFFERENT THINGS: SEVERAL DAYS
6. BECOMING EASILY ANNOYED OR IRRITABLE: MORE THAN HALF THE DAYS
5. BEING SO RESTLESS THAT IT IS HARD TO SIT STILL: NOT AT ALL
GAD7 TOTAL SCORE: 6
2. NOT BEING ABLE TO STOP OR CONTROL WORRYING: SEVERAL DAYS
1. FEELING NERVOUS, ANXIOUS, OR ON EDGE: SEVERAL DAYS

## 2022-11-10 ASSESSMENT — PATIENT HEALTH QUESTIONNAIRE - PHQ9
SUM OF ALL RESPONSES TO PHQ QUESTIONS 1-9: 13
10. IF YOU CHECKED OFF ANY PROBLEMS, HOW DIFFICULT HAVE THESE PROBLEMS MADE IT FOR YOU TO DO YOUR WORK, TAKE CARE OF THINGS AT HOME, OR GET ALONG WITH OTHER PEOPLE: SOMEWHAT DIFFICULT
10. IF YOU CHECKED OFF ANY PROBLEMS, HOW DIFFICULT HAVE THESE PROBLEMS MADE IT FOR YOU TO DO YOUR WORK, TAKE CARE OF THINGS AT HOME, OR GET ALONG WITH OTHER PEOPLE: SOMEWHAT DIFFICULT

## 2022-11-10 ASSESSMENT — PAIN SCALES - GENERAL: PAINLEVEL: SEVERE PAIN (6)

## 2022-11-10 NOTE — PATIENT INSTRUCTIONS
Encouraged increase fluids with electrolytes such as Gatorade and chicken broth.  Increase diet as tolerated.    Try small amounts of food and drink frequently. Offer a bland diet. Advance as tolerated. Avoid dairy products the first day. You may add yogurt tomorrow as the first dairy product.    Return to clinic or emergency room if symptoms are changing or worsening.    Try the BRAT diet (bread, bananas, rice, applesauce, tea, toast).  This is a very bland diet which should not irritate your colon.  Hold off on spicy foods, red sauces, mexican or chinese food.    Call or return to clinic as needed if your symptoms worsen or fail to improve as anticipated.     If the pain does not begin improving, localizes to the right lower belly, there is increased fever, or other progression of symptoms, return for reassessment.    Should I see a doctor or nurse about my stomach ache? -- Most people do not need to see a doctor or nurse for a stomach ache. But you should see your doctor or nurse if:  ?You have bloody bowel movements, diarrhea, or vomiting  ?Your pain is severe and lasts more than an hour or comes and goes for more than 24 hours  ?You cannot eat or drink for hours  ?You have a fever higher than 102 F (39 C)  ?You lose a lot of weight without trying to, or lose interest in food

## 2022-11-10 NOTE — NURSING NOTE
Pt presents to clinic today for a possible medication reaction to Prozac. States she stopped taking it 2 days ago because it was making her nauseous, blurred vision, dizziness, headaches and severe diarrhea.   States she currently has a headache and has no appetitive. Her mom was recently in the hospital because of a medication reaction to Prozac.     FOOD SECURITY SCREENING QUESTIONS:    The next two questions are to help us understand your food security.  If you are feeling you need any assistance in this area, we have resources available to support you today.    Hunger Vital Signs:  Within the past 12 months we worried whether our food would run out before we got money to buy more. Never  Within the past 12 months the food we bought just didn't last and we didn't have money to get more. Never            Medication Reconciliation: complete  Henry Fuentes LPN,LPN on 11/10/2022 at 2:00 PM

## 2022-11-10 NOTE — PROGRESS NOTES
Assessment & Plan   Problem List Items Addressed This Visit    None  Visit Diagnoses     Nausea and vomiting, unspecified vomiting type    -  Primary    Relevant Medications    ondansetron (ZOFRAN ODT) ODT tab 4 mg    ondansetron (ZOFRAN ODT) 4 MG ODT tab    Other Relevant Orders    CBC and Differential (Completed)    Basic Metabolic Panel (Completed)    UA reflex to Microscopic (Completed)    Symptomatic; Unknown Influenza A/B & SARS-CoV2 (COVID-19) Virus PCR Multiplex Nose (Completed)    Pregnancy, Urine (HCG) (Completed)    Medication reaction, initial encounter             Completed a thorough work-up.  Patient was given Zofran 4 mg ODT to help alleviate her symptoms.  Tolerated medication well.  No side effects noted.  Completed CBC, BMP, urinalysis, and UPT which are unremarkable.  Completed influenza, COVID-19, and RSV test which was negative.  Symptoms likely due to a reaction to the Prozac.  Highly stressed need to increase fluids with electrolytes and getting rest.  Patient was given a work note.  Given a prescription for Zofran 4 mg ODT to use as needed for symptomatic relief.  Return to clinic emergency room if symptoms are changing or worsening.  May need to go to the ER if symptoms are persistent or worsening for IV fluids if needed.  Vital signs are stable today.  Patient has close follow-up with her PCP on 11/14/2022.  Gave warning signs and symptoms.    30 minutes spent on the date of the encounter doing chart review, history and exam, documentation and further activities per the note       Depression Screening Follow Up    PHQ 11/10/2022   PHQ-9 Total Score 13   Q9: Thoughts of better off dead/self-harm past 2 weeks Not at all   PHQ-A Total Score -   PHQ-A Depressed most days in past year -   PHQ-A Mood affect on daily activities -   PHQ-A Suicide Ideation past 2 weeks -   PHQ-A Suicide Ideation past month -   PHQ-A Previous suicide attempt -     Last PHQ-9 11/10/2022   1.  Little interest or  "pleasure in doing things 2   2.  Feeling down, depressed, or hopeless 2   3.  Trouble falling or staying asleep, or sleeping too much 2   4.  Feeling tired or having little energy 2   5.  Poor appetite or overeating 2   6.  Feeling bad about yourself 1   7.  Trouble concentrating 2   8.  Moving slowly or restless 0   Q9: Thoughts of better off dead/self-harm past 2 weeks 0   PHQ-9 Total Score 13   Difficulty at work, home, or with people -       Follow Up Actions Taken  Referred patient back to PCP     See Patient Instructions    Return if symptoms worsen or fail to improve.    Nadine Bishop PA-C  [facility]    Anabel Nicholson is a 22 year old accompanied by her partner, presenting for the following health issues:  Medication Problem (Possible reaction)      History of Present Illness       Mental Health Follow-up:  Patient presents to follow-up on Depression & Anxiety.Patient's depression since last visit has been:  No change  The patient is not having other symptoms associated with depression.  Patient's anxiety since last visit has been:  No change  The patient is not having other symptoms associated with anxiety.  Any significant life events: job concerns, financial concerns and health concerns  Patient is not feeling anxious or having panic attacks.  Patient has no concerns about alcohol or drug use.    Headaches:   Since the patient's last clinic visit, headaches are: worsened  The patient is getting headaches:  Everyday  She is able to do normal daily activities when she has a migraine.  The patient is taking the following rescue/relief medications:  No rescue/relief medications, Ibuprofen (Advil, Motrin), Tylenol and Excedrin   Patient states \"I get no relief\" from the rescue/relief medications.   The patient is taking the following medications to prevent migraines:  No medications to prevent migraines  In the past 4 weeks, the patient has gone to an Urgent Care or Emergency Room " 0 times times due to headaches.    Reason for visit:  Reacting to a medication severe symptoms  Symptom onset:  1-3 days ago  Symptoms include:  Headache, vomiting, dizzy, blurred vision,loss of appetite, trouble sleeping  Symptom intensity:  Moderate  Symptom progression:  Staying the same  Had these symptoms before:  No  What makes it worse:  Standing up or moving around  What makes it better:  Laying down without moving    She eats 0-1 servings of fruits and vegetables daily.She consumes 2 sweetened beverage(s) daily.She exercises with enough effort to increase her heart rate 9 or less minutes per day.  She exercises with enough effort to increase her heart rate 5 days per week.   She is taking medications regularly.    Today's PHQ-9         PHQ-9 Total Score: 13    PHQ-9 Q9 Thoughts of better off dead/self-harm past 2 weeks :   Not at all    How difficult have these problems made it for you to do your work, take care of things at home, or get along with other people: Somewhat difficult  Today's RAHAT-7 Score: 6       Medication Followup of prozax    Taking Medication as prescribed: NO-stopped 2 days ago    Side Effects:  Dizziness, nausea, diarrhea, blurred vision    Medication Helping Symptoms:  NO    Patient was recently seen by her PCP on 10/24.  Was given a Prozac prescription for anxiety and depression.  Initially did fine with the medication however she is now concerned that she is having a reaction to the medication.  A few days after she started the medication her mother was noted to go to the ICU for a Prozac reaction.  Mother was diagnosed with a really low potassium and sodium level.  Mother was previously on it for 3 months.  After a week of the patient being on the medication she noticed that her head was spinning, she felt dizzy, and vomited in the morning.  Symptoms started on 11/7.  Has had some diarrhea.  Still drinking water however she has no appetite.  Has a headache and feels like her heart  was pounding.  Her concerns are getting better.  Symptoms worsen with getting up.  Felt dizzy with standing.  Last vomited before coming here.  Last night she hamburger helper that stayed down.  Concerned that she is having a reaction.  No throat closure concerns or problems breathing.  No fevers, chills, sore throat, ear pain, cough, urinary symptoms.  No blood in the urine or stool.    Review of Systems   Constitutional, HEENT, cardiovascular, pulmonary, gi and gu systems are negative, except as otherwise noted.      Objective    BP (!) 118/98 (BP Location: Right arm, Patient Position: Sitting, Cuff Size: Adult Large)   Pulse 95   Temp (!) 96.5  F (35.8  C) (Tympanic)   Resp 16   Wt 85.7 kg (189 lb)   LMP 11/04/2022 (Exact Date)   SpO2 95%   BMI 32.44 kg/m    Body mass index is 32.44 kg/m .  Physical Exam  Vitals and nursing note reviewed.   Constitutional:       General: She is not in acute distress.     Appearance: Normal appearance. She is well-developed.   HENT:      Head: Normocephalic and atraumatic.      Right Ear: Tympanic membrane, ear canal and external ear normal.      Left Ear: Tympanic membrane, ear canal and external ear normal.      Nose: Nose normal.      Mouth/Throat:      Mouth: Mucous membranes are moist.      Pharynx: No oropharyngeal exudate or posterior oropharyngeal erythema.   Eyes:      Extraocular Movements: Extraocular movements intact.      Conjunctiva/sclera: Conjunctivae normal.      Pupils: Pupils are equal, round, and reactive to light.   Cardiovascular:      Rate and Rhythm: Normal rate and regular rhythm.      Heart sounds: Normal heart sounds, S1 normal and S2 normal. No murmur heard.    No friction rub. No S3 or S4 sounds.   Pulmonary:      Effort: Pulmonary effort is normal. No respiratory distress.      Breath sounds: Normal breath sounds. No wheezing or rales.   Abdominal:      General: Abdomen is flat. Bowel sounds are normal.      Palpations: Abdomen is soft. There  is no mass.      Tenderness: There is no abdominal tenderness. There is no right CVA tenderness, left CVA tenderness, guarding or rebound.      Hernia: No hernia is present.   Musculoskeletal:         General: Normal range of motion.   Skin:     General: Skin is warm and dry.      Findings: No rash.   Neurological:      General: No focal deficit present.      Mental Status: She is alert and oriented to person, place, and time.      Cranial Nerves: Cranial nerves 2-12 are intact.      Sensory: Sensation is intact.      Motor: Motor function is intact.      Coordination: Coordination is intact.   Psychiatric:         Mood and Affect: Mood normal.         Behavior: Behavior normal.         Thought Content: Thought content normal.         Judgment: Judgment normal.            Results for orders placed or performed in visit on 11/10/22   UA reflex to Microscopic     Status: Normal   Result Value Ref Range    Color Urine Light Yellow Colorless, Straw, Light Yellow, Yellow    Appearance Urine Clear Clear    Glucose Urine Negative Negative mg/dL    Bilirubin Urine Negative Negative    Ketones Urine Negative Negative mg/dL    Specific Gravity Urine 1.024 1.000 - 1.030    Blood Urine Negative Negative    pH Urine 6.5 5.0 - 9.0    Protein Albumin Urine Negative Negative mg/dL    Urobilinogen Urine Normal Normal, 2.0 mg/dL    Nitrite Urine Negative Negative    Leukocyte Esterase Urine Negative Negative    Narrative    Microscopic not indicated   Symptomatic; Unknown Influenza A/B & SARS-CoV2 (COVID-19) Virus PCR Multiplex Nose     Status: Normal    Specimen: Nose; Swab   Result Value Ref Range    Influenza A PCR Negative Negative    Influenza B PCR Negative Negative    RSV PCR Negative Negative    SARS CoV2 PCR Negative Negative    Narrative    Testing was performed using the Xpert Xpress CoV2/Flu/RSV Assay on the [employer] GeneXpert Instrument. This test should be ordered for the detection of SARS-CoV-2 and influenza viruses in  individuals who meet clinical and/or epidemiological criteria. Test performance is unknown in asymptomatic patients. This test is for in vitro diagnostic use under the FDA EUA for laboratories certified under CLIA to perform high or moderate complexity testing. This test has not been FDA cleared or approved. A negative result does not rule out the presence of PCR inhibitors in the specimen or target RNA in concentration below the limit of detection for the assay. If only one viral target is positive but coinfection with multiple targets is suspected, the sample should be re-tested with another FDA cleared, approved, or authorized test, if coinfection would change clinical management. This test was validated by the [facility]. These laboratories are certified under the Clinical Laboratory Improvement Amendments of 1988 (CLIA-88) as qualified to perform high complexity laboratory testing.   Pregnancy, Urine (HCG)     Status: Normal   Result Value Ref Range    hCG Urine Qualitative Negative Negative   Basic Metabolic Panel     Status: Normal   Result Value Ref Range    Sodium 138 136 - 145 mmol/L    Potassium 4.2 3.4 - 5.3 mmol/L    Chloride 101 98 - 107 mmol/L    Carbon Dioxide (CO2) 27 22 - 29 mmol/L    Anion Gap 10 7 - 15 mmol/L    Urea Nitrogen 12.1 6.0 - 20.0 mg/dL    Creatinine 0.71 0.51 - 0.95 mg/dL    Calcium 9.6 8.6 - 10.0 mg/dL    Glucose 83 70 - 99 mg/dL    GFR Estimate >90 >60 mL/min/1.73m2   CBC with platelets and differential     Status: None   Result Value Ref Range    WBC Count 6.4 4.0 - 11.0 10e3/uL    RBC Count 5.11 3.80 - 5.20 10e6/uL    Hemoglobin 15.5 11.7 - 15.7 g/dL    Hematocrit 44.9 35.0 - 47.0 %    MCV 88 78 - 100 fL    MCH 30.3 26.5 - 33.0 pg    MCHC 34.5 31.5 - 36.5 g/dL    RDW 11.8 10.0 - 15.0 %    Platelet Count 316 150 - 450 10e3/uL    % Neutrophils 60 %    % Lymphocytes 26 %    % Monocytes 7 %    % Eosinophils 6 %    % Basophils 1 %    % Immature Granulocytes 0 %     NRBCs per 100 WBC 0 <1 /100    Absolute Neutrophils 3.8 1.6 - 8.3 10e3/uL    Absolute Lymphocytes 1.6 0.8 - 5.3 10e3/uL    Absolute Monocytes 0.5 0.0 - 1.3 10e3/uL    Absolute Eosinophils 0.4 0.0 - 0.7 10e3/uL    Absolute Basophils 0.1 0.0 - 0.2 10e3/uL    Absolute Immature Granulocytes 0.0 <=0.4 10e3/uL    Absolute NRBCs 0.0 10e3/uL   CBC and Differential     Status: None    Narrative    The following orders were created for panel order CBC and Differential.  Procedure                               Abnormality         Status                     ---------                               -----------         ------                     CBC with platelets and d...[[id number]]                      Final result                 Please view results for these tests on the individual orders.

## 2022-11-10 NOTE — LETTER
November 10, 2022      Lyn Ny  [id number] [id number]        To Whom It May Concern:    Lyn Ny was seen in our clinic. Please excuse from work from 11/8-11/12/2022. She may return to work without restrictions.      Sincerely,        Nadine Bishop PA-C

## 2022-11-11 ASSESSMENT — PATIENT HEALTH QUESTIONNAIRE - PHQ9
SUM OF ALL RESPONSES TO PHQ QUESTIONS 1-9: 13
10. IF YOU CHECKED OFF ANY PROBLEMS, HOW DIFFICULT HAVE THESE PROBLEMS MADE IT FOR YOU TO DO YOUR WORK, TAKE CARE OF THINGS AT HOME, OR GET ALONG WITH OTHER PEOPLE: SOMEWHAT DIFFICULT

## 2022-11-11 ASSESSMENT — ANXIETY QUESTIONNAIRES: GAD7 TOTAL SCORE: 6

## 2022-11-11 NOTE — TELEPHONE ENCOUNTER
Called and left message for patient to please return call.     Ronel Conner LPN on 11/11/2022 at 9:37 AM

## 2022-11-14 ENCOUNTER — OFFICE VISIT (OUTPATIENT)
Dept: FAMILY MEDICINE | Facility: OTHER | Age: 22
End: 2022-11-14
Attending: FAMILY MEDICINE
Payer: COMMERCIAL

## 2022-11-14 VITALS
OXYGEN SATURATION: 98 % | TEMPERATURE: 97.7 F | WEIGHT: 191.6 LBS | RESPIRATION RATE: 16 BRPM | HEART RATE: 95 BPM | SYSTOLIC BLOOD PRESSURE: 112 MMHG | BODY MASS INDEX: 32.89 KG/M2 | DIASTOLIC BLOOD PRESSURE: 70 MMHG

## 2022-11-14 DIAGNOSIS — F41.9 ANXIETY AND DEPRESSION: Primary | ICD-10-CM

## 2022-11-14 DIAGNOSIS — F32.A ANXIETY AND DEPRESSION: Primary | ICD-10-CM

## 2022-11-14 PROCEDURE — 99213 OFFICE O/P EST LOW 20 MIN: CPT | Performed by: FAMILY MEDICINE

## 2022-11-14 ASSESSMENT — ANXIETY QUESTIONNAIRES: GAD7 TOTAL SCORE: 6

## 2022-11-14 ASSESSMENT — PAIN SCALES - GENERAL: PAINLEVEL: NO PAIN (0)

## 2022-11-14 NOTE — NURSING NOTE
Chief Complaint   Patient presents with     Recheck Medication     Here today to discuss alternative medication. Was taking Prozac for about 2 weeks, then her depression/anxiety were getting worse.     Medication Reconciliation: complete    Ronel Conner LPN

## 2022-11-14 NOTE — TELEPHONE ENCOUNTER
Closing encounter. Patient was given letter today at appt.     Ronel Conner LPN on 11/14/2022 at 1:56 PM

## 2022-11-14 NOTE — PROGRESS NOTES
Nursing Notes:   Ronel Conner LPN  11/14/2022  1:43 PM  Sign at exiting of workspace  Chief Complaint   Patient presents with     Recheck Medication     Here today to discuss alternative medication. Was taking Prozac for about 2 weeks, then her depression/anxiety were getting worse.     Medication Reconciliation: complete    Ronel Conner LPN      Anabel Nicholson is a 22 year old, presenting for the following health issues:  Recheck Medication    She had only been able to tolerate the fluoxetine.  After about 10 days of taking it, she developed nausea, headache, diarrhea, dizziness.  She started feeling better after stopping it about 5 days later.  She is feeling back to normal now.  Her anxiety and depression are not better though.  No suicidal ideation.  She is interested in trying a different medication.  Her mom did well on zoloft for many years.  Her mom was just in the hospital with serious side effects due to fluoxetine including hyponatremia, hypokalemia and prolonged QT interval.  She had also been on hydrochlorothiazide and some other blood pressure medications as well.    History of Present Illness       Mental Health Follow-up:  Patient presents to follow-up on Depression & Anxiety.Patient's depression since last visit has been:  No change  The patient is not having other symptoms associated with depression.  Patient's anxiety since last visit has been:  No change  The patient is not having other symptoms associated with anxiety.  Any significant life events: job concerns, financial concerns and health concerns  Patient is not feeling anxious or having panic attacks.  Patient has no concerns about alcohol or drug use.    Headaches:   Since the patient's last clinic visit, headaches are: worsened  The patient is getting headaches:  Everyday  She is able to do normal daily activities when she has a migraine.  The patient is taking the following rescue/relief medications:  No rescue/relief  "medications, Ibuprofen (Advil, Motrin), Tylenol and Excedrin   Patient states \"I get no relief\" from the rescue/relief medications.   The patient is taking the following medications to prevent migraines:  No medications to prevent migraines  In the past 4 weeks, the patient has gone to an Urgent Care or Emergency Room 0 times times due to headaches.    Reason for visit:  Reacting to a medication severe symptoms  Symptom onset:  1-3 days ago  Symptoms include:  Headache, vomiting, dizzy, blurred vision,loss of appetite, trouble sleeping  Symptom intensity:  Moderate  Symptom progression:  Staying the same  Had these symptoms before:  No  What makes it worse:  Standing up or moving around  What makes it better:  Laying down without moving    She eats 0-1 servings of fruits and vegetables daily.She consumes 2 sweetened beverage(s) daily.She exercises with enough effort to increase her heart rate 9 or less minutes per day.  She exercises with enough effort to increase her heart rate 5 days per week.   She is taking medications regularly.    Today's PHQ-9         PHQ-9 Total Score: 13    PHQ-9 Q9 Thoughts of better off dead/self-harm past 2 weeks :   Not at all    How difficult have these problems made it for you to do your work, take care of things at home, or get along with other people: Somewhat difficult  Today's RAHAT-7 Score: 6       Med Check       Review of Systems   Constitutional, HEENT, cardiovascular, pulmonary, GI, , musculoskeletal, neuro, skin, endocrine and psych systems are negative, except as otherwise noted.      Objective    /70   Pulse 95   Temp 97.7  F (36.5  C) (Tympanic)   Resp 16   Wt 86.9 kg (191 lb 9.6 oz)   LMP 11/04/2022 (Exact Date)   SpO2 98%   Breastfeeding No   BMI 32.89 kg/m    Body mass index is 32.89 kg/m .  Physical Exam  Constitutional:       Appearance: Normal appearance.   HENT:      Head: Normocephalic.   Eyes:      Extraocular Movements: Extraocular movements " intact.      Pupils: Pupils are equal, round, and reactive to light.   Cardiovascular:      Rate and Rhythm: Normal rate and regular rhythm.      Heart sounds: Normal heart sounds. No murmur heard.  Pulmonary:      Effort: Pulmonary effort is normal.      Breath sounds: Normal breath sounds. No wheezing, rhonchi or rales.   Musculoskeletal:      Cervical back: Normal range of motion and neck supple.   Lymphadenopathy:      Cervical: No cervical adenopathy.   Neurological:      Mental Status: She is alert.   Psychiatric:         Mood and Affect: Mood normal.         Behavior: Behavior normal.            Assessment & Plan   Lyn Ny is a 22 year old, presenting for the following health issues:      ICD-10-CM    1. Anxiety and depression  F41.9 sertraline (ZOLOFT) 50 MG tablet    F32.A         1.  Discussed options of trial another medication vs counseling vs both and she would like to try a new medication.  Since her mother did well with zoloft in the past, she would like to try this too.  Start on 25 mg daily x 8 days, then increase to 50 mg daily.  Follow up with me in 1 month, sooner if worsening symptoms or other concerns.      Return in about 1 month (around 12/14/2022) for Follow up visit.    Narcisa Steve MD  [facility]

## 2022-12-12 ENCOUNTER — OFFICE VISIT (OUTPATIENT)
Dept: FAMILY MEDICINE | Facility: OTHER | Age: 22
End: 2022-12-12
Attending: FAMILY MEDICINE
Payer: COMMERCIAL

## 2022-12-12 VITALS
TEMPERATURE: 97.5 F | SYSTOLIC BLOOD PRESSURE: 120 MMHG | DIASTOLIC BLOOD PRESSURE: 84 MMHG | WEIGHT: 197 LBS | RESPIRATION RATE: 16 BRPM | BODY MASS INDEX: 33.81 KG/M2 | HEART RATE: 82 BPM | OXYGEN SATURATION: 97 %

## 2022-12-12 DIAGNOSIS — F41.9 ANXIETY AND DEPRESSION: Primary | ICD-10-CM

## 2022-12-12 DIAGNOSIS — F32.A ANXIETY AND DEPRESSION: Primary | ICD-10-CM

## 2022-12-12 DIAGNOSIS — Z13.29 SCREENING FOR THYROID DISORDER: ICD-10-CM

## 2022-12-12 LAB — TSH SERPL DL<=0.005 MIU/L-ACNC: 1.25 UIU/ML (ref 0.3–4.2)

## 2022-12-12 PROCEDURE — 84443 ASSAY THYROID STIM HORMONE: CPT | Mod: ZL | Performed by: FAMILY MEDICINE

## 2022-12-12 PROCEDURE — 90471 IMMUNIZATION ADMIN: CPT | Performed by: FAMILY MEDICINE

## 2022-12-12 PROCEDURE — 90651 9VHPV VACCINE 2/3 DOSE IM: CPT | Performed by: FAMILY MEDICINE

## 2022-12-12 PROCEDURE — 36415 COLL VENOUS BLD VENIPUNCTURE: CPT | Mod: ZL | Performed by: FAMILY MEDICINE

## 2022-12-12 PROCEDURE — 99213 OFFICE O/P EST LOW 20 MIN: CPT | Mod: 25 | Performed by: FAMILY MEDICINE

## 2022-12-12 ASSESSMENT — PAIN SCALES - GENERAL: PAINLEVEL: SEVERE PAIN (7)

## 2022-12-12 ASSESSMENT — ANXIETY QUESTIONNAIRES
GAD7 TOTAL SCORE: 1
3. WORRYING TOO MUCH ABOUT DIFFERENT THINGS: SEVERAL DAYS
1. FEELING NERVOUS, ANXIOUS, OR ON EDGE: NOT AT ALL
7. FEELING AFRAID AS IF SOMETHING AWFUL MIGHT HAPPEN: NOT AT ALL
5. BEING SO RESTLESS THAT IT IS HARD TO SIT STILL: NOT AT ALL
6. BECOMING EASILY ANNOYED OR IRRITABLE: NOT AT ALL
IF YOU CHECKED OFF ANY PROBLEMS ON THIS QUESTIONNAIRE, HOW DIFFICULT HAVE THESE PROBLEMS MADE IT FOR YOU TO DO YOUR WORK, TAKE CARE OF THINGS AT HOME, OR GET ALONG WITH OTHER PEOPLE: NOT DIFFICULT AT ALL
4. TROUBLE RELAXING: NOT AT ALL
2. NOT BEING ABLE TO STOP OR CONTROL WORRYING: NOT AT ALL
7. FEELING AFRAID AS IF SOMETHING AWFUL MIGHT HAPPEN: NOT AT ALL
8. IF YOU CHECKED OFF ANY PROBLEMS, HOW DIFFICULT HAVE THESE MADE IT FOR YOU TO DO YOUR WORK, TAKE CARE OF THINGS AT HOME, OR GET ALONG WITH OTHER PEOPLE?: NOT DIFFICULT AT ALL
GAD7 TOTAL SCORE: 1
GAD7 TOTAL SCORE: 1

## 2022-12-12 ASSESSMENT — PATIENT HEALTH QUESTIONNAIRE - PHQ9
SUM OF ALL RESPONSES TO PHQ QUESTIONS 1-9: 7
SUM OF ALL RESPONSES TO PHQ QUESTIONS 1-9: 7
10. IF YOU CHECKED OFF ANY PROBLEMS, HOW DIFFICULT HAVE THESE PROBLEMS MADE IT FOR YOU TO DO YOUR WORK, TAKE CARE OF THINGS AT HOME, OR GET ALONG WITH OTHER PEOPLE: SOMEWHAT DIFFICULT

## 2022-12-12 NOTE — NURSING NOTE
Chief Complaint   Patient presents with     Recheck Medication       Medication Reconciliation: complete    Ronel Conner, LPN

## 2022-12-12 NOTE — PROGRESS NOTES
Nursing Notes:   Ronel Conner LPN  12/12/2022 10:22 AM  Sign at exiting of workspace  Chief Complaint   Patient presents with     Recheck Medication       Medication Reconciliation: brittney Conner LPN      Anabel Nicholson is a 22 year old, presenting for the following health issues:  Recheck Medication    Still feeling tired, but is having an easier time getting motivated to do things.  Hasn't felt as down as she had been.  Has noted some weight gain, but had noticed this even before taking any medications for her depression.    History of Present Illness       Mental Health Follow-up:  Patient presents to follow-up on Depression.Patient's depression since last visit has been:  Better  The patient is not having other symptoms associated with depression.      Any significant life events: financial concerns and health concerns  Patient is not feeling anxious or having panic attacks.  Patient has no concerns about alcohol or drug use.    She eats 0-1 servings of fruits and vegetables daily.She consumes 3 sweetened beverage(s) daily.She exercises with enough effort to increase her heart rate 10 to 19 minutes per day.  She exercises with enough effort to increase her heart rate 3 or less days per week.   She is taking medications regularly.    Today's PHQ-9         PHQ-9 Total Score: 7    PHQ-9 Q9 Thoughts of better off dead/self-harm past 2 weeks :   Not at all    How difficult have these problems made it for you to do your work, take care of things at home, or get along with other people: Somewhat difficult  Today's RAHAT-7 Score: 1     PHQ 11/10/2022 11/10/2022 12/12/2022   PHQ-9 Total Score 13 13 7   Q9: Thoughts of better off dead/self-harm past 2 weeks Not at all Not at all Not at all   PHQ-A Total Score - - -   PHQ-A Depressed most days in past year - - -   PHQ-A Mood affect on daily activities - - -   PHQ-A Suicide Ideation past 2 weeks - - -   PHQ-A Suicide Ideation past month - - -   PHQ-A  Previous suicide attempt - - -     RAHAT-7 SCORE 11/10/2022 11/10/2022 12/12/2022   Total Score - 6 (mild anxiety) 1 (minimal anxiety)   Total Score 6 6 1         Med Check       Review of Systems   Constitutional, HEENT, cardiovascular, pulmonary, GI, , musculoskeletal, neuro, skin, endocrine and psych systems are negative, except as otherwise noted.      Objective    /84   Pulse 82   Temp 97.5  F (36.4  C) (Tympanic)   Resp 16   Wt 89.4 kg (197 lb)   LMP 12/05/2022 (Approximate)   SpO2 97%   Breastfeeding No   BMI 33.81 kg/m    Body mass index is 33.81 kg/m .  Physical Exam  Constitutional:       Appearance: Normal appearance. She is not toxic-appearing.   HENT:      Head: Normocephalic and atraumatic.   Eyes:      Extraocular Movements: Extraocular movements intact.      Pupils: Pupils are equal, round, and reactive to light.   Cardiovascular:      Rate and Rhythm: Normal rate and regular rhythm.      Heart sounds: Normal heart sounds. No murmur heard.  Pulmonary:      Effort: Pulmonary effort is normal.      Breath sounds: Normal breath sounds. No wheezing, rhonchi or rales.   Musculoskeletal:      Cervical back: Normal range of motion and neck supple.      Right lower leg: No edema.      Left lower leg: No edema.   Neurological:      Mental Status: She is alert.   Psychiatric:         Mood and Affect: Mood normal.         Behavior: Behavior normal.                Assessment & Plan   Lyn Ny is a 22 year old, presenting for the following health issues:      ICD-10-CM    1. Anxiety and depression  F41.9     F32.A       2. Screening for thyroid disorder  Z13.29 TSH Reflex GH     TSH Reflex GH        1.  Improved with current dose of zoloft.  She wishes to stay on her current dose at this time.  Follow up as needed.  2.  Repeat TSH today with weight gain.      Encouraged weight loss and regular exercise.     No follow-ups on file.    Narcisa Steve MD  [facility] AND  [facility]

## 2023-01-13 ENCOUNTER — OFFICE VISIT (OUTPATIENT)
Dept: FAMILY MEDICINE | Facility: OTHER | Age: 23
End: 2023-01-13
Attending: NURSE PRACTITIONER
Payer: COMMERCIAL

## 2023-01-13 VITALS
WEIGHT: 201.6 LBS | DIASTOLIC BLOOD PRESSURE: 76 MMHG | SYSTOLIC BLOOD PRESSURE: 114 MMHG | HEART RATE: 84 BPM | OXYGEN SATURATION: 97 % | TEMPERATURE: 98.4 F | BODY MASS INDEX: 34.6 KG/M2 | RESPIRATION RATE: 16 BRPM

## 2023-01-13 DIAGNOSIS — L03.316 CELLULITIS OF UMBILICUS: ICD-10-CM

## 2023-01-13 DIAGNOSIS — L08.9 SOFT TISSUE INFECTION: Primary | ICD-10-CM

## 2023-01-13 PROCEDURE — 99213 OFFICE O/P EST LOW 20 MIN: CPT

## 2023-01-13 RX ORDER — CEPHALEXIN 500 MG/1
500 CAPSULE ORAL 4 TIMES DAILY
Qty: 20 CAPSULE | Refills: 0 | Status: SHIPPED | OUTPATIENT
Start: 2023-01-13 | End: 2023-01-18

## 2023-01-13 ASSESSMENT — PAIN SCALES - GENERAL: PAINLEVEL: EXTREME PAIN (8)

## 2023-01-13 NOTE — PROGRESS NOTES
ASSESSMENT/PLAN:    I have reviewed the nursing notes.  I have reviewed the findings, diagnosis, plan and need for follow up with the patient.    1. Soft tissue infection  2. Cellulitis of umbilicus  - cephALEXin (KEFLEX) 500 MG capsule; Take 1 capsule (500 mg) by mouth 4 times daily for 5 days  Dispense: 20 capsule; Refill: 0    Vital signs stable. Physical exam and symptoms consistent with cellulitis of umbilicus. Antibiotic was prescribed, Keflex PO four times daily x 5 days. Side effect profile of antibiotic was discused with patient, recommend increasing yogurt/probiotic use while on medication, take entire course of medication, even if feeling better prior to this (unless adverse side effect occur, recommend follow up). If fevers, chills, abnormal drainage, worsening redness or other worrisome signs occur, patient is agreeable to follow up promptly for reevaluation. OK to alternate OTC analgesics such as tylenol and ibuprofen every 4-6 hours for pain control, as well as alternating heat and ice. Advised patient to outline the redness with a pen or marker for further monitoring.     Discussed warning signs/symptoms indicative of need to f/u    Follow up if symptoms persist or worsen or concerns    I explained my diagnostic considerations and recommendations to the patient, who voiced understanding and agreement with the treatment plan. All questions were answered. We discussed potential side effects of any prescribed or recommended therapies, as well as expectations for response to treatments.    Andrés Schroeder, MILTON CNP  1/13/2023  4:46 PM    HPI:    Lyn Ny is a 22 year old female  who presents to [facility] today for concerns of abdominal pain that started a little over a week ago. She noticed it from her pants rubbing on the area.     abdominal pain.     Subjective:   Pain Location:  periumbilical  Pain Quality: present: moderate periumbilical, constant  Associated Factors: none  Aggravating  Factors: bending over and with palpation  Alleviating Factors: nothing    Presence of the Following:  No diarrhea  No constipation  No nausea  No vomiting  No fevers, chills  No urinary symptoms  No blood in stool  No mucus in stool    Last Bowel Movement: this morning  Last Urination: hour ago    Any prior diagnosis of:   No peptic ulcer dz/GERD  No pancreatitis  No appendicitis/appendectomy  No gall bladder pathology (gallstone, cholecystectomy, etc.)  No liver disease/pathology  No renal disease, renal stones, etc.  No diverticulosis/diverticulitis  No IBS/IBD  No diabetes    No prior GI or GYN surgeries (appendectomy, cholecystectomy, hysterectomy, etc.)    Female: LMP - end of December    No recent travel  No recent antibiotic usage  No sick/ill contact exposure  No recent hospitalization    Last colonoscopy (timeframe, normal/abnormal): n/a    Took a pregnancy test this morning and it was negative  Denies any hx of piercings in the area    PCP: Dr. Steve        Past Medical History:   Diagnosis Date     Spondylolysis of lumbar region     No Comments Provided     Past Surgical History:   Procedure Laterality Date     Lumbar fusion of L5  8/2/3017    subsequent infection     Social History     Tobacco Use     Smoking status: Never     Smokeless tobacco: Never     Tobacco comments:     Quit smoking: Mom smokes outside   Substance Use Topics     Alcohol use: No     Current Outpatient Medications   Medication Sig Dispense Refill     acetaminophen (TYLENOL) 325 MG tablet Take 650 mg by mouth       albuterol (PROAIR HFA/PROVENTIL HFA/VENTOLIN HFA) 108 (90 Base) MCG/ACT inhaler INHALE 2 PUFFS INTO THE LUNGS EVERY 4 HOURS AS NEEDED FOR SHORTNESS OF BREATH / DYSPNEA OR WHEEZING 18 g 11     norgestrel-ethinyl estradiol (LO/OVRAL) 0.3-30 MG-MCG tablet Take 1 tablet by mouth daily 84 tablet 4     ondansetron (ZOFRAN ODT) 4 MG ODT tab Take 1 tablet (4 mg) by mouth every 8 hours as needed for nausea or vomiting 30  tablet 0     sertraline (ZOLOFT) 50 MG tablet Take 1/2 by mouth daily x 8 days, then increase to 1 by mouth daily. 90 tablet 3     Allergies   Allergen Reactions     Azithromycin Other (See Comments)     Numbness in the right side, neurological changes.      Amoxicillin Nausea and Vomiting     Tolerates cephalexin     Prozac [Fluoxetine] Dizziness and Nausea and Vomiting     Rifampin Nausea and Vomiting     Hydrocodone Nausea     Vomiting     Past medical history, past surgical history, current medications and allergies reviewed and accurate to the best of my knowledge.      ROS:  Refer to HPI    /76   Pulse 84   Temp 98.4  F (36.9  C) (Temporal)   Resp 16   Wt 91.4 kg (201 lb 9.6 oz)   LMP 12/26/2022 (Approximate)   SpO2 97%   Breastfeeding No   BMI 34.60 kg/m      EXAM:  General Appearance: Well appearing 22 year old female, appropriate appearance for age. No acute distress   Eyes: conjunctivae normal without erythema or irritation, corneas clear, no drainage or crusting, no eyelid swelling, pupils equal   Respiratory: normal chest wall and respirations.  Normal effort.    No increased work of breathing.  No cough appreciated.  Cardiac: RRR with no murmurs  Abdomen: soft, no rigidity, no rebound tenderness or guarding, normal bowel sounds present, superficial redness and tenderness of the upper umbilicus, a pea sized nodule is felt in the proximal umbilicus that is non-reducible, warmth noted with palpation of the area  Musculoskeletal:  Equal movement of bilateral upper extremities.  Equal movement of bilateral lower extremities.  Normal gait.    Dermatological: no rashes noted of exposed skin  Neuro: Alert and oriented to person, place, and time.    Psychological: normal affect, alert, oriented, and pleasant.

## 2023-01-13 NOTE — PATIENT INSTRUCTIONS
Please refer to your AVS for follow up and pain/symptoms management recommendations (I.e.: medications, helpful conservative treatment modalities, appropriate follow up if need to a specialist or family practice, etc.). Please return to urgent care if your symptoms change or worsen.     Discharge instructions:  -If you were prescribed a medication(s), please take this as prescribed/directed  -Monitor your symptoms, if changing/worsening, return to UC/ER or PCP for follow up    Cellulitis/Skin Infection   -If you received a prescription for a topical or oral antibiotic, please take/use as directed.   -Keep the area clean and dry.   -If needed - for pain control - we recommend alternating Tylenol and Ibuprofen if you are able to take these medications. Alternate every 4 hours as needed. I.e.: Ibuprofen at 8am, Tylenol 12pm, Ibuprofen 4pm    -Daily maximum of Tylenol is 4000mg (recommend staying under 3000mg)   -Daily maximum of Ibuprofen is 3200 mg    Watch for worsening symptoms such as fevers, chills, worsening redness, abnormal drainage from site of cellulitis, etc.

## 2023-01-13 NOTE — NURSING NOTE
"Chief Complaint   Patient presents with     Abdominal Pain     Pain and redness in navel area for a week   She has had pain and redness around her navel for a week.  Kristine Fuchs LPN..................1/13/2023   4:30 PM      Initial /76   Pulse 84   Temp 98.4  F (36.9  C) (Temporal)   Resp 16   Wt 91.4 kg (201 lb 9.6 oz)   LMP 12/26/2022 (Approximate)   SpO2 97%   Breastfeeding No   BMI 34.60 kg/m   Estimated body mass index is 34.6 kg/m  as calculated from the following:    Height as of 10/24/22: 1.626 m (5' 4\").    Weight as of this encounter: 91.4 kg (201 lb 9.6 oz).  Medication Reconciliation: complete    FOOD SECURITY SCREENING QUESTIONS  Hunger Vital Signs:  Within the past 12 months we worried whether our food would run out before we got money to buy more. Never  Within the past 12 months the food we bought just didn't last and we didn't have money to get more. Never        Advance care directive on file? no  Advance care directive provided to patient? declined     Kristine Fuchs, CIELO  "

## 2023-01-19 ENCOUNTER — TELEPHONE (OUTPATIENT)
Dept: FAMILY MEDICINE | Facility: OTHER | Age: 23
End: 2023-01-19
Payer: COMMERCIAL

## 2023-01-19 NOTE — TELEPHONE ENCOUNTER
Patient states she has not felt good the past 3 days with complaints of Headache and nausea. No redness to umbilicus with slight firmness. Denies fevers.  Patient made aware we will call if a note is not going to be placed.     Mela Chang LPN on 1/19/2023 at 3:43 PM

## 2023-01-19 NOTE — TELEPHONE ENCOUNTER
Patient was treated for cellulitis of her umbilicus on 1/13/23 with a 5 day course of antibiotics. Need to determine why she needs a work note for 1/17/23-1/19/23 as her antibiotic should be completed and she had no work restrictions while on the antibiotic. MILTON Bar, MATTHEW  ....................  1/19/2023   2:15 PM

## 2023-01-19 NOTE — TELEPHONE ENCOUNTER
Patient is requesting a work note that will cover the dates of 1/17-1/19. Is this something Andrés JORDAN is able to do for patient? Please call when available.    Thais Nguyen on 1/19/2023 at 10:50 AM

## 2023-01-20 NOTE — TELEPHONE ENCOUNTER
After verifying patient's name and date of birth, patient was given the below information.  Dorothy Jenkins LPN....1/19/2023 6:20 PM

## 2023-01-20 NOTE — TELEPHONE ENCOUNTER
Patient should be re-evaluated for new onset of symptoms. I wouldn't be able to write a work note for new symptoms I have not seen her for. MILTON Bar, MATTHEW  ....................  1/19/2023   6:14 PM

## 2023-05-02 ENCOUNTER — OFFICE VISIT (OUTPATIENT)
Dept: FAMILY MEDICINE | Facility: OTHER | Age: 23
End: 2023-05-02
Attending: FAMILY MEDICINE
Payer: COMMERCIAL

## 2023-05-02 VITALS
RESPIRATION RATE: 20 BRPM | WEIGHT: 217 LBS | HEART RATE: 88 BPM | OXYGEN SATURATION: 98 % | BODY MASS INDEX: 37.25 KG/M2 | SYSTOLIC BLOOD PRESSURE: 136 MMHG | DIASTOLIC BLOOD PRESSURE: 98 MMHG | TEMPERATURE: 98.8 F

## 2023-05-02 DIAGNOSIS — R03.0 ELEVATED BLOOD PRESSURE READING WITHOUT DIAGNOSIS OF HYPERTENSION: ICD-10-CM

## 2023-05-02 DIAGNOSIS — F41.9 ANXIETY AND DEPRESSION: ICD-10-CM

## 2023-05-02 DIAGNOSIS — F32.A ANXIETY AND DEPRESSION: ICD-10-CM

## 2023-05-02 DIAGNOSIS — R11.0 NAUSEA: ICD-10-CM

## 2023-05-02 DIAGNOSIS — R35.0 URINARY FREQUENCY: ICD-10-CM

## 2023-05-02 DIAGNOSIS — Z23 NEED FOR HPV VACCINATION: ICD-10-CM

## 2023-05-02 DIAGNOSIS — R63.5 WEIGHT GAIN: Primary | ICD-10-CM

## 2023-05-02 LAB
ALBUMIN UR-MCNC: NEGATIVE MG/DL
ANION GAP SERPL CALCULATED.3IONS-SCNC: 13 MMOL/L (ref 7–15)
APPEARANCE UR: CLEAR
BILIRUB UR QL STRIP: NEGATIVE
BUN SERPL-MCNC: 7.8 MG/DL (ref 6–20)
CALCIUM SERPL-MCNC: 9.4 MG/DL (ref 8.6–10)
CHLORIDE SERPL-SCNC: 99 MMOL/L (ref 98–107)
COLOR UR AUTO: YELLOW
CREAT SERPL-MCNC: 0.67 MG/DL (ref 0.51–0.95)
DEPRECATED HCO3 PLAS-SCNC: 24 MMOL/L (ref 22–29)
GFR SERPL CREATININE-BSD FRML MDRD: >90 ML/MIN/1.73M2
GLUCOSE SERPL-MCNC: 78 MG/DL (ref 70–99)
GLUCOSE UR STRIP-MCNC: NEGATIVE MG/DL
HBA1C MFR BLD: 5.3 % (ref 4–6.2)
HCG UR QL: NEGATIVE
HGB UR QL STRIP: NEGATIVE
KETONES UR STRIP-MCNC: NEGATIVE MG/DL
LEUKOCYTE ESTERASE UR QL STRIP: NEGATIVE
NITRATE UR QL: NEGATIVE
PH UR STRIP: 7 [PH] (ref 5–9)
POTASSIUM SERPL-SCNC: 3.9 MMOL/L (ref 3.4–5.3)
SODIUM SERPL-SCNC: 136 MMOL/L (ref 136–145)
SP GR UR STRIP: 1.02 (ref 1–1.03)
TSH SERPL DL<=0.005 MIU/L-ACNC: 1.34 UIU/ML (ref 0.3–4.2)
UROBILINOGEN UR STRIP-MCNC: NORMAL MG/DL

## 2023-05-02 PROCEDURE — 36415 COLL VENOUS BLD VENIPUNCTURE: CPT | Mod: ZL | Performed by: FAMILY MEDICINE

## 2023-05-02 PROCEDURE — 99214 OFFICE O/P EST MOD 30 MIN: CPT | Mod: 25 | Performed by: FAMILY MEDICINE

## 2023-05-02 PROCEDURE — 80048 BASIC METABOLIC PNL TOTAL CA: CPT | Mod: ZL | Performed by: FAMILY MEDICINE

## 2023-05-02 PROCEDURE — 83036 HEMOGLOBIN GLYCOSYLATED A1C: CPT | Mod: ZL | Performed by: FAMILY MEDICINE

## 2023-05-02 PROCEDURE — 81025 URINE PREGNANCY TEST: CPT | Mod: ZL | Performed by: FAMILY MEDICINE

## 2023-05-02 PROCEDURE — 90651 9VHPV VACCINE 2/3 DOSE IM: CPT | Performed by: FAMILY MEDICINE

## 2023-05-02 PROCEDURE — 83525 ASSAY OF INSULIN: CPT | Mod: ZL | Performed by: FAMILY MEDICINE

## 2023-05-02 PROCEDURE — 84443 ASSAY THYROID STIM HORMONE: CPT | Mod: ZL | Performed by: FAMILY MEDICINE

## 2023-05-02 PROCEDURE — 81003 URINALYSIS AUTO W/O SCOPE: CPT | Mod: ZL | Performed by: FAMILY MEDICINE

## 2023-05-02 PROCEDURE — 82533 TOTAL CORTISOL: CPT | Mod: ZL | Performed by: FAMILY MEDICINE

## 2023-05-02 PROCEDURE — 90471 IMMUNIZATION ADMIN: CPT | Performed by: FAMILY MEDICINE

## 2023-05-02 RX ORDER — BUPROPION HYDROCHLORIDE 150 MG/1
150 TABLET ORAL EVERY MORNING
Qty: 903 TABLET | Refills: 3 | Status: SHIPPED | OUTPATIENT
Start: 2023-05-02 | End: 2023-07-03

## 2023-05-02 RX ORDER — SERTRALINE HYDROCHLORIDE 25 MG/1
TABLET, FILM COATED ORAL
Qty: 11 TABLET | Refills: 0 | Status: SHIPPED | OUTPATIENT
Start: 2023-05-02 | End: 2023-05-22

## 2023-05-02 ASSESSMENT — ASTHMA QUESTIONNAIRES
ACT_TOTALSCORE: 22
QUESTION_1 LAST FOUR WEEKS HOW MUCH OF THE TIME DID YOUR ASTHMA KEEP YOU FROM GETTING AS MUCH DONE AT WORK, SCHOOL OR AT HOME: NONE OF THE TIME
ACT_TOTALSCORE: 22
QUESTION_5 LAST FOUR WEEKS HOW WOULD YOU RATE YOUR ASTHMA CONTROL: WELL CONTROLLED
QUESTION_3 LAST FOUR WEEKS HOW OFTEN DID YOUR ASTHMA SYMPTOMS (WHEEZING, COUGHING, SHORTNESS OF BREATH, CHEST TIGHTNESS OR PAIN) WAKE YOU UP AT NIGHT OR EARLIER THAN USUAL IN THE MORNING: NOT AT ALL
QUESTION_2 LAST FOUR WEEKS HOW OFTEN HAVE YOU HAD SHORTNESS OF BREATH: NOT AT ALL
QUESTION_4 LAST FOUR WEEKS HOW OFTEN HAVE YOU USED YOUR RESCUE INHALER OR NEBULIZER MEDICATION (SUCH AS ALBUTEROL): TWO OR THREE TIMES PER WEEK

## 2023-05-02 ASSESSMENT — PAIN SCALES - GENERAL: PAINLEVEL: MODERATE PAIN (4)

## 2023-05-02 NOTE — LETTER
May 2, 2023      Lyn Ny  [address] [facility] [address]        To Whom It May Concern:    Lyn Ny  was seen on 5/2/23.  Please excuse her  until 5/3/23 due to illness.        Sincerely,        Narcisa Steve MD

## 2023-05-02 NOTE — PROGRESS NOTES
Nursing Notes:   Kayleen Irving LPN  5/2/2023  3:56 PM  Sign at exiting of workspace  Patient is here for concerns with weight gain over the last couple months, nausea, feeling warm, and vomiting.     Patient's last menstrual period was 04/22/2023 (approximate).  Medication Reconciliation: complete      Kayleen Irving LPN 5/2/2023 3:56 PM       Advance care directive on file? no  Advance care directive provided to patient? no       Kayleen Irving LPN      Subjective   Lyn is a 22 year old, presenting for the following health issues:  Weight Problem        5/2/2023     3:52 PM   Additional Questions   Roomed by Kayleen brandt     History of Present Illness       Reason for visit:  Issues with huge weight gain and wanting to go on Wellbutrin to help my cravings    She eats 0-1 servings of fruits and vegetables daily.She consumes 1 sweetened beverage(s) daily.She exercises with enough effort to increase her heart rate 60 or more minutes per day.  She exercises with enough effort to increase her heart rate 5 days per week.   She is taking medications regularly.     Lyn is here today to discuss weight gain.  She had been between 165-170 lb for quite a while, but has gained over the last year, mostly in the past couple of months.  She is on zoloft and had restarted that in November.  Has been having very bad cravings for food.  Has a hard time stopping eating when she gets hooked on something like pasta or pizza.  She is outside walking her dog a lot.  Trying to drink a lot of water.  Stopped drinking sweetened beverages about 3 months ago.  Has been very nauseous every single day lately.  Feels very hot as well.    She has taken 3 pregnancy tests in the past week and they were all negative.  Was vomiting this morning.  No gastroesophageal reflux disease symptoms with the nausea.  Has had more headaches lately too.  Even if she doesn't have a headache might have the nausea as well.  Has been drinking more  fluids than she thinks she should be and also urinating more too.            Review of Systems   Constitutional, HEENT, cardiovascular, pulmonary, GI, , musculoskeletal, neuro, skin, endocrine and psych systems are negative, except as otherwise noted.      Objective    BP (!) 136/98   Pulse 88   Temp 98.8  F (37.1  C) (Tympanic)   Resp 20   Wt 98.4 kg (217 lb)   LMP 04/22/2023 (Approximate)   SpO2 98%   Breastfeeding No   BMI 37.25 kg/m    Body mass index is 37.25 kg/m .  Physical Exam  Constitutional:       Appearance: Normal appearance.   HENT:      Head: Normocephalic.   Eyes:      Extraocular Movements: Extraocular movements intact.      Pupils: Pupils are equal, round, and reactive to light.   Cardiovascular:      Rate and Rhythm: Normal rate and regular rhythm.   Pulmonary:      Effort: Pulmonary effort is normal.      Breath sounds: Normal breath sounds. No wheezing, rhonchi or rales.   Abdominal:      Comments: Stretch marks with purple red discoloration on lower lateral abdomen bilaterally.   Musculoskeletal:      Cervical back: Normal range of motion and neck supple.      Right lower leg: No edema.      Left lower leg: No edema.   Lymphadenopathy:      Cervical: No cervical adenopathy.   Neurological:      Mental Status: She is alert.   Psychiatric:         Mood and Affect: Mood normal.         Behavior: Behavior normal.        Results for orders placed or performed in visit on 05/02/23   UA reflex to Microscopic     Status: Normal   Result Value Ref Range    Color Urine Yellow Colorless, Straw, Light Yellow, Yellow    Appearance Urine Clear Clear    Glucose Urine Negative Negative mg/dL    Bilirubin Urine Negative Negative    Ketones Urine Negative Negative mg/dL    Specific Gravity Urine 1.022 1.000 - 1.030    Blood Urine Negative Negative    pH Urine 7.0 5.0 - 9.0    Protein Albumin Urine Negative Negative mg/dL    Urobilinogen Urine Normal Normal, 2.0 mg/dL    Nitrite Urine Negative Negative     Leukocyte Esterase Urine Negative Negative    Narrative    Microscopic not indicated   Pregnancy, Urine (HCG)     Status: Normal   Result Value Ref Range    hCG Urine Qualitative Negative Negative   Basic Metabolic Panel     Status: Normal   Result Value Ref Range    Sodium 136 136 - 145 mmol/L    Potassium 3.9 3.4 - 5.3 mmol/L    Chloride 99 98 - 107 mmol/L    Carbon Dioxide (CO2) 24 22 - 29 mmol/L    Anion Gap 13 7 - 15 mmol/L    Urea Nitrogen 7.8 6.0 - 20.0 mg/dL    Creatinine 0.67 0.51 - 0.95 mg/dL    Calcium 9.4 8.6 - 10.0 mg/dL    Glucose 78 70 - 99 mg/dL    GFR Estimate >90 >60 mL/min/1.73m2   Hemoglobin A1c     Status: Normal   Result Value Ref Range    Hemoglobin A1C 5.3 4.0 - 6.2 %   TSH Reflex GH     Status: Normal   Result Value Ref Range    TSH 1.34 0.30 - 4.20 uIU/mL                  Assessment & Plan     ICD-10-CM    1. Weight gain  R63.5 Insulin level     TSH Reflex GH     Hemoglobin A1c     Basic Metabolic Panel     buPROPion (WELLBUTRIN XL) 150 MG 24 hr tablet     Cortisol     Cortisol     Basic Metabolic Panel     Hemoglobin A1c     TSH Reflex GH     Insulin level      2. Nausea  R11.0 Pregnancy, Urine (HCG)     Pregnancy, Urine (HCG)      3. Urinary frequency  R35.0 UA reflex to Microscopic     Urine Culture Aerobic Bacterial     UA reflex to Microscopic      4. Anxiety and depression  F41.9 buPROPion (WELLBUTRIN XL) 150 MG 24 hr tablet    F32.A sertraline (ZOLOFT) 25 MG tablet      5. Need for HPV vaccination  Z23 GH IMM-  HUMAN PAPILLOMA VIRUS (GARDASIL 9) VACCINE      6. Elevated blood pressure reading without diagnosis of hypertension  R03.0 Cortisol     Cortisol        1.  Weight gain is likely a side effect of the Zoloft she has been on.  Recommended discontinuing this and trying Wellbutrin instead.  She will follow-up in a month to recheck and see how this is working for her anxiety and depression.  Other labs were completed as above and normal so far.  2.  This could be a side effect  of the Zoloft as well.  We will reassess in a month.  Urine hCG is negative.  If this is escalating despite being off of the Zoloft, would recommend further evaluation.  3.  UA and glucose completed as above.  4.  Transitioning from sertraline to Wellbutrin as above.  She will follow-up with me in a month.  5.  Gardasil vaccine given today.  6.  Blood pressure is elevated today.  With the striae she has on her abdomen, 4 PM cortisol completed today to ensure no findings suggestive of Cushing's disease.  Reassess blood pressure on follow-up visit.             Encouraged weight loss and regular exercise.    Return in about 1 month (around 6/2/2023) for Follow up visit.    Narcisa Steve MD  [facility] AND [facility]

## 2023-05-02 NOTE — NURSING NOTE
Patient is here for concerns with weight gain over the last couple months, nausea, feeling warm, and vomiting.     Patient's last menstrual period was 04/22/2023 (approximate).  Medication Reconciliation: complete      Kayleen Irving LPN 5/2/2023 3:56 PM       Advance care directive on file? no  Advance care directive provided to patient? no       Kayleen Irving LPN

## 2023-05-02 NOTE — PATIENT INSTRUCTIONS
Week #1:  Take Sertraline 25 mg daily WITH Wellbutrin  mg daily.  Week #2:  take sertraline 12.5 mg daily (1/2 of 25 mg) WITH Wellbutrin  mg daily.  Week #3:  stop sertraline, continue on Wellbutrin  mg daily.

## 2023-05-03 LAB
CORTIS SERPL-MCNC: 22.5 UG/DL
INSULIN SERPL-ACNC: 13.1 UU/ML (ref 2.6–24.9)

## 2023-05-04 ENCOUNTER — MYC MEDICAL ADVICE (OUTPATIENT)
Dept: FAMILY MEDICINE | Facility: OTHER | Age: 23
End: 2023-05-04
Payer: COMMERCIAL

## 2023-05-05 DIAGNOSIS — R63.5 WEIGHT GAIN: Primary | ICD-10-CM

## 2023-05-05 DIAGNOSIS — R03.0 ELEVATED BLOOD PRESSURE READING WITHOUT DIAGNOSIS OF HYPERTENSION: ICD-10-CM

## 2023-05-05 DIAGNOSIS — R79.89 ELEVATED CORTISOL LEVEL: ICD-10-CM

## 2023-05-05 DIAGNOSIS — R11.0 NAUSEA: ICD-10-CM

## 2023-05-08 NOTE — TELEPHONE ENCOUNTER
Got a call back from patient.  She can see Dr. Steve tomorrow at 10:20.  Called [facility] to schedule.  [facility]    Aleta Boyle RN on 5/8/2023 at 2:54 PM    
She needs an appointment with multiple concerns.  Please help her to schedule.  Letter for missed work dates can be completed at that time.  Ok to offer her a same day spot.  Narcisa Steve MD   
[Negative] : Musculoskeletal

## 2023-05-09 ENCOUNTER — OFFICE VISIT (OUTPATIENT)
Dept: FAMILY MEDICINE | Facility: OTHER | Age: 23
End: 2023-05-09
Attending: FAMILY MEDICINE
Payer: COMMERCIAL

## 2023-05-09 VITALS
BODY MASS INDEX: 36.94 KG/M2 | DIASTOLIC BLOOD PRESSURE: 110 MMHG | SYSTOLIC BLOOD PRESSURE: 144 MMHG | OXYGEN SATURATION: 96 % | WEIGHT: 215.2 LBS | HEART RATE: 116 BPM | RESPIRATION RATE: 16 BRPM | TEMPERATURE: 99.5 F

## 2023-05-09 DIAGNOSIS — N76.0 VAGINITIS AND VULVOVAGINITIS: ICD-10-CM

## 2023-05-09 DIAGNOSIS — I10 ESSENTIAL HYPERTENSION, BENIGN: ICD-10-CM

## 2023-05-09 DIAGNOSIS — R79.89 ELEVATED CORTISOL LEVEL: ICD-10-CM

## 2023-05-09 DIAGNOSIS — R51.9 ACUTE INTRACTABLE HEADACHE, UNSPECIFIED HEADACHE TYPE: Primary | ICD-10-CM

## 2023-05-09 LAB
ALBUMIN UR-MCNC: 20 MG/DL
APPEARANCE UR: ABNORMAL
BACTERIA #/AREA URNS HPF: ABNORMAL /HPF
BILIRUB UR QL STRIP: NEGATIVE
COLOR UR AUTO: YELLOW
GLUCOSE UR STRIP-MCNC: NEGATIVE MG/DL
HGB UR QL STRIP: ABNORMAL
KETONES UR STRIP-MCNC: NEGATIVE MG/DL
LEUKOCYTE ESTERASE UR QL STRIP: ABNORMAL
MUCOUS THREADS #/AREA URNS LPF: PRESENT /LPF
NITRATE UR QL: NEGATIVE
PH UR STRIP: 6.5 [PH] (ref 5–9)
RBC URINE: 4 /HPF
SP GR UR STRIP: 1.03 (ref 1–1.03)
SQUAMOUS EPITHELIAL: 4 /HPF
UROBILINOGEN UR STRIP-MCNC: 3 MG/DL
WBC URINE: 11 /HPF

## 2023-05-09 PROCEDURE — 99214 OFFICE O/P EST MOD 30 MIN: CPT | Performed by: FAMILY MEDICINE

## 2023-05-09 PROCEDURE — 87086 URINE CULTURE/COLONY COUNT: CPT | Mod: ZL | Performed by: FAMILY MEDICINE

## 2023-05-09 PROCEDURE — 81001 URINALYSIS AUTO W/SCOPE: CPT | Mod: ZL | Performed by: FAMILY MEDICINE

## 2023-05-09 RX ORDER — FLUCONAZOLE 150 MG/1
150 TABLET ORAL ONCE
Qty: 1 TABLET | Refills: 0 | Status: SHIPPED | OUTPATIENT
Start: 2023-05-09 | End: 2023-05-09

## 2023-05-09 ASSESSMENT — ENCOUNTER SYMPTOMS
FEVER: 0
COUGH: 0
SHORTNESS OF BREATH: 0
NERVOUS/ANXIOUS: 1
SLEEP DISTURBANCE: 1
CHILLS: 0

## 2023-05-09 ASSESSMENT — PAIN SCALES - GENERAL: PAINLEVEL: SEVERE PAIN (7)

## 2023-05-09 NOTE — NURSING NOTE
Chief Complaint   Patient presents with     History of Present Illness       Medication Reconciliation: complete    Ronel Conner, LPN

## 2023-05-09 NOTE — PROGRESS NOTES
Nursing Notes:   Ronel Bang LPN  5/9/2023 10:23 AM  Signed  Chief Complaint   Patient presents with     History of Present Illness       Medication Reconciliation: CIELO Chew Rachel T, LPN  5/9/2023 10:28 AM  Sign at exiting of workspace  Chief Complaint   Patient presents with     Headache       Medication Reconciliation: brittney Bang LPN          Anabel Nicholson is a 22 year old, presenting for the following health issues:  Headache        5/9/2023    10:22 AM   Additional Questions   Roomed by Ronel Bang   Accompanied by self     For the past month, she has been having headaches, but they are getting much worse.  Cannot get them to go away.  Eyes hurt.  Pain at the base of her neck and upper back.  Has had some hair loss over the past couple of years.  Thinned out a lot despite taking vitamins and rogaine.  Keeps breaking off.  Getting a little better since then.  Had eye exam 2 months ago, which was normal.  She is in the process of weaning off of zoloft as she is starting on Wellbutrin.  She feels that her headaches were definitely present prior to this, but have been worse in the past few days.      Has had red spots on her chest.  Face broke out with acne very bad.  Not sleeping well.  She has not been able to sleep more than 3 hours in the past few days.      Blood pressure is very high today.  Feels swollen on the back of her neck/upper back.  She had a cortisol level drawn, which was very mildly elevated at 4:28 pm of 22.5 (normal range for 4 pm of 3-17 for our lab).    History of Present Illness       Reason for visit:  Issues with huge weight gain and wanting to go on Wellbutrin to help my cravings    She eats 0-1 servings of fruits and vegetables daily.She consumes 1 sweetened beverage(s) daily.She exercises with enough effort to increase her heart rate 60 or more minutes per day.  She exercises with enough effort to increase her heart rate  5 days per week.   She is taking medications regularly.       Multiple concerns         Review of Systems   Constitutional: Negative for chills and fever.   Respiratory: Negative for cough and shortness of breath.    Cardiovascular: Negative for peripheral edema.   Psychiatric/Behavioral: Positive for sleep disturbance. The patient is nervous/anxious.       Constitutional, HEENT, cardiovascular, pulmonary, GI, , musculoskeletal, neuro, skin, endocrine and psych systems are negative, except as otherwise noted.      Objective    BP (!) 152/112   Pulse 116   Temp 99.5  F (37.5  C) (Tympanic)   Resp 16   Wt 97.6 kg (215 lb 3.2 oz)   LMP 04/22/2023 (Approximate)   SpO2 96%   Breastfeeding No   BMI 36.94 kg/m    Body mass index is 36.94 kg/m .  Physical Exam  Constitutional:       General: She is not in acute distress.     Appearance: Normal appearance. She is obese. She is not toxic-appearing.   HENT:      Head: Normocephalic.   Eyes:      Extraocular Movements: Extraocular movements intact.      Pupils: Pupils are equal, round, and reactive to light.   Neck:      Comments: Prominent dorsal fat pad.  Cardiovascular:      Rate and Rhythm: Tachycardia present.      Pulses: Normal pulses.      Heart sounds: Normal heart sounds. No murmur heard.  Pulmonary:      Effort: Pulmonary effort is normal.      Breath sounds: Normal breath sounds. No wheezing, rhonchi or rales.   Abdominal:      General: Abdomen is flat. Bowel sounds are normal.      Palpations: Abdomen is soft.      Comments: Prominent stretch marks on abdomen, which are red-purple in color.   Musculoskeletal:      Cervical back: Normal range of motion and neck supple.   Lymphadenopathy:      Cervical: No cervical adenopathy.   Neurological:      Mental Status: She is alert.          Assessment & Plan     ICD-10-CM    1. Acute intractable headache, unspecified headache type  R51.9 MR Brain w/o & w Contrast     CANCELED: MR Brain w/o Contrast      2.  Elevated cortisol level  R79.89 Cortisol Cortisone Free 24 Hour Urine      3. Essential hypertension, benign  I10 other medical supplies      4. Vaginitis and vulvovaginitis  N76.0 UA reflex to Microscopic     Urine Culture Aerobic Bacterial     fluconazole (DIFLUCAN) 150 MG tablet     UA reflex to Microscopic     Urine Culture Aerobic Bacterial        1.  Her headache was present prior to starting to wean off of zoloft, but definitely is worse with this.  Will obtain MRI of brain, but the worsened headache may reflect zoloft withdrawal symptoms.  If headache does not completely resolve with cessation of zoloft, would recommend she consider follow up with ophthalmology again for optic nerve evaluation to ensure no findings suggestive of increased intracranial hypertension.  If headache still or not improving, could consider neurology visit as well has considering lumbar puncture to check intracranial pressure.  2.  Discussed that not checking this at exactly 4 PM may make a difference as there is a diurnal variation with this lab.  She has already been referred to endocrinology.  We will also start further evaluation with a 24-hour urine cortisol level as well.  Discussed that she does have some findings that could be consistent with Cushing's syndrome such as the dorsal fat pad, striae, acne elevated blood pressure, etc. but this is a relatively uncommon diagnosis and hopefully this is not what she has.  I do suspect that a lot of her weight gain was probably related to the Zoloft she was taking.  The hypertension may be related to her weight gain as well as a familial predisposition to this condition as well.  3.  She is going to check her blood pressures regularly over the next 2 weeks then follow-up with me in clinic.  Given her young age, I did not start her on medication at this time.  She is quite anxious and her Zoloft to Wellbutrin transition may be exacerbating this, which may be contributing to her  elevated blood pressure.  She did have a normal TSH just to week ago.  4.  Treat empirically with Diflucan.  If not improving, follow-up.             Encouraged weight loss and regular exercise.     Return in about 2 weeks (around 5/23/2023) for Hypertension.     36 minutes spent in review of chart, interviewing patient, examining patient, discussing plan, reviewing results and completing note.      Narcisa Steve MD  [facility] AND [facility]

## 2023-05-09 NOTE — LETTER
May 9, 2023      Lyn Ny  [address] [facility] [address]        To Whom It May Concern:    Lyn Ny  was seen on 5/9/23.  Please excuse 5/2-5/5/23 and 5/9-5/11/23 due to medical concerns.        Sincerely,        Narcisa Steve MD

## 2023-05-09 NOTE — NURSING NOTE
Chief Complaint   Patient presents with     Headache       Medication Reconciliation: complete    Ronel Conner, LPN

## 2023-05-10 LAB — BACTERIA UR CULT: NORMAL

## 2023-05-11 ENCOUNTER — LAB (OUTPATIENT)
Dept: LAB | Facility: OTHER | Age: 23
End: 2023-05-11
Attending: FAMILY MEDICINE
Payer: COMMERCIAL

## 2023-05-11 DIAGNOSIS — R79.89 ELEVATED CORTISOL LEVEL: ICD-10-CM

## 2023-05-11 PROCEDURE — 82530 CORTISOL FREE: CPT | Mod: ZL

## 2023-05-16 LAB
COLLECT DURATION TIME UR: 24 H
CORTIS 24H UR-MRATE: 23 MCG/24 H (ref 3.5–45)
CORTISONE 24H UR-MRATE: 49 MCG/24 H (ref 17–129)
SPECIMEN VOL 24H UR: 600 ML

## 2023-05-17 ENCOUNTER — HOSPITAL ENCOUNTER (OUTPATIENT)
Dept: MRI IMAGING | Facility: OTHER | Age: 23
Discharge: HOME | End: 2023-05-17
Attending: FAMILY MEDICINE | Admitting: FAMILY MEDICINE
Payer: COMMERCIAL

## 2023-05-17 DIAGNOSIS — R51.9 ACUTE INTRACTABLE HEADACHE, UNSPECIFIED HEADACHE TYPE: ICD-10-CM

## 2023-05-17 PROCEDURE — A9575 INJ GADOTERATE MEGLUMI 0.1ML: HCPCS | Performed by: FAMILY MEDICINE

## 2023-05-17 PROCEDURE — 70553 MRI BRAIN STEM W/O & W/DYE: CPT

## 2023-05-17 PROCEDURE — 255N000002 HC RX 255 OP 636: Performed by: FAMILY MEDICINE

## 2023-05-17 RX ADMIN — GADOTERATE MEGLUMINE 20 ML: 376.9 INJECTION INTRAVENOUS at 19:29

## 2023-05-19 ENCOUNTER — MYC MEDICAL ADVICE (OUTPATIENT)
Dept: FAMILY MEDICINE | Facility: OTHER | Age: 23
End: 2023-05-19
Payer: COMMERCIAL

## 2023-05-19 DIAGNOSIS — R09.81 CHRONIC NASAL CONGESTION: Primary | ICD-10-CM

## 2023-05-22 ENCOUNTER — OFFICE VISIT (OUTPATIENT)
Dept: FAMILY MEDICINE | Facility: OTHER | Age: 23
End: 2023-05-22
Attending: FAMILY MEDICINE
Payer: COMMERCIAL

## 2023-05-22 VITALS
HEART RATE: 88 BPM | RESPIRATION RATE: 16 BRPM | SYSTOLIC BLOOD PRESSURE: 144 MMHG | TEMPERATURE: 98.3 F | WEIGHT: 213.8 LBS | BODY MASS INDEX: 36.7 KG/M2 | OXYGEN SATURATION: 98 % | DIASTOLIC BLOOD PRESSURE: 92 MMHG

## 2023-05-22 DIAGNOSIS — R79.89 ELEVATED CORTISOL LEVEL: ICD-10-CM

## 2023-05-22 DIAGNOSIS — I10 PRIMARY HYPERTENSION: Primary | ICD-10-CM

## 2023-05-22 DIAGNOSIS — R51.9 ACUTE INTRACTABLE HEADACHE, UNSPECIFIED HEADACHE TYPE: ICD-10-CM

## 2023-05-22 PROCEDURE — 99213 OFFICE O/P EST LOW 20 MIN: CPT | Performed by: FAMILY MEDICINE

## 2023-05-22 RX ORDER — LISINOPRIL 10 MG/1
10 TABLET ORAL DAILY
Qty: 90 TABLET | Refills: 3 | Status: SHIPPED | OUTPATIENT
Start: 2023-05-22 | End: 2023-06-05

## 2023-05-22 ASSESSMENT — PAIN SCALES - GENERAL: PAINLEVEL: SEVERE PAIN (6)

## 2023-05-22 NOTE — NURSING NOTE
Chief Complaint   Patient presents with     RECHECK         Medication Reconciliation: complete    Ronel Conner, LPN

## 2023-05-22 NOTE — PROGRESS NOTES
Subjective   Lyn is a 22 year old, presenting for the following health issues:  RECHECK        5/22/2023     1:51 PM   Additional Questions   Roomed by Ronel Conner   Accompanied by self     Lyn is here today to follow up on her blood pressure.  She is not yet on any blood pressure medications.  Her mom has a history of hypertension.  Her blood pressures have ranged between 125-155/.  The higher her blood pressures, the worse she has felt.  She continues to have bad headaches.  Her mom has had issues with significantly low sodium and potassium with hydrochlorothiazide.      Has appointment to see Endocrinology in August due to a mildly elevated 4 PM cortisol level that was recently obtained.  She did do a 24-hour urine cortisol, which returned normal.      History of Present Illness       Reason for visit:  Issues with huge weight gain and wanting to go on Wellbutrin to help my cravings    She eats 0-1 servings of fruits and vegetables daily.She consumes 1 sweetened beverage(s) daily.She exercises with enough effort to increase her heart rate 60 or more minutes per day.  She exercises with enough effort to increase her heart rate 5 days per week.   She is taking medications regularly.       Recheck         Review of Systems   Constitutional, HEENT, cardiovascular, pulmonary, GI, , musculoskeletal, neuro, skin, endocrine and psych systems are negative, except as otherwise noted.      Objective    BP (!) 144/92   Pulse 88   Temp 98.3  F (36.8  C) (Tympanic)   Resp 16   Wt 97 kg (213 lb 12.8 oz)   LMP 05/19/2023 (Approximate)   SpO2 98%   Breastfeeding No   BMI 36.70 kg/m    Body mass index is 36.7 kg/m .  Physical Exam  Constitutional:       Appearance: Normal appearance.   HENT:      Head: Normocephalic.   Eyes:      Extraocular Movements: Extraocular movements intact.      Pupils: Pupils are equal, round, and reactive to light.   Cardiovascular:      Rate and Rhythm: Normal rate and  regular rhythm.      Heart sounds: Normal heart sounds. No murmur heard.  Pulmonary:      Effort: Pulmonary effort is normal.      Breath sounds: Normal breath sounds. No wheezing, rhonchi or rales.   Musculoskeletal:      Cervical back: Normal range of motion and neck supple.      Right lower leg: No edema.      Left lower leg: No edema.   Lymphadenopathy:      Cervical: No cervical adenopathy.   Neurological:      Mental Status: She is alert.   Psychiatric:         Mood and Affect: Mood normal.         Behavior: Behavior normal.          Assessment & Plan     ICD-10-CM    1. Primary hypertension  I10 lisinopril (ZESTRIL) 10 MG tablet     Basic Metabolic Panel      2. Acute intractable headache, unspecified headache type  R51.9       3. Elevated cortisol level  R79.89         1.  Given her persistently elevated blood pressures, will start on lisinopril 10 mg daily.  She does have a follow-up appointment on 6/5/2023 and we will plan to recheck her blood pressure at that time as well as recheck a basic metabolic profile as well.  2.  Her headaches could be aggravated by her elevated blood pressures.  We will reassess to see if these improve once her blood pressure is improved with the lisinopril.  If not, consider imaging of brain and possible neurology consult.  3.  As above, she does have an upcoming appointment with endocrinology.           Encouraged regular exercise.     No follow-ups on file.    Narcisa Steve MD  [facility]

## 2023-05-29 ENCOUNTER — MYC MEDICAL ADVICE (OUTPATIENT)
Dept: FAMILY MEDICINE | Facility: OTHER | Age: 23
End: 2023-05-29
Payer: COMMERCIAL

## 2023-05-30 NOTE — TELEPHONE ENCOUNTER
I did send a message to patient asking specific dates and such. She has further questions regarding her lisinopril.     Ronel Conner LPN on 5/30/2023 at 8:31 AM

## 2023-06-02 NOTE — TELEPHONE ENCOUNTER
Left forms filled out by Dr. Steve in large envelope at [address] Check-in Desk with patient's name and .    Updated patient on MyChart.    Aleta Boyle RN on 2023 at 2:20 PM

## 2023-06-05 ENCOUNTER — OFFICE VISIT (OUTPATIENT)
Dept: FAMILY MEDICINE | Facility: OTHER | Age: 23
End: 2023-06-05
Attending: FAMILY MEDICINE
Payer: COMMERCIAL

## 2023-06-05 ENCOUNTER — LAB (OUTPATIENT)
Dept: LAB | Facility: OTHER | Age: 23
End: 2023-06-05
Attending: FAMILY MEDICINE
Payer: COMMERCIAL

## 2023-06-05 VITALS
TEMPERATURE: 97.8 F | DIASTOLIC BLOOD PRESSURE: 88 MMHG | SYSTOLIC BLOOD PRESSURE: 130 MMHG | HEART RATE: 89 BPM | RESPIRATION RATE: 16 BRPM | BODY MASS INDEX: 36.73 KG/M2 | OXYGEN SATURATION: 97 % | WEIGHT: 214 LBS

## 2023-06-05 DIAGNOSIS — I10 PRIMARY HYPERTENSION: Primary | ICD-10-CM

## 2023-06-05 DIAGNOSIS — F32.A ANXIETY AND DEPRESSION: ICD-10-CM

## 2023-06-05 DIAGNOSIS — J32.1 CHRONIC FRONTAL SINUSITIS: ICD-10-CM

## 2023-06-05 DIAGNOSIS — F41.9 ANXIETY AND DEPRESSION: ICD-10-CM

## 2023-06-05 DIAGNOSIS — I10 PRIMARY HYPERTENSION: ICD-10-CM

## 2023-06-05 LAB
ANION GAP SERPL CALCULATED.3IONS-SCNC: 11 MMOL/L (ref 7–15)
BUN SERPL-MCNC: 9.7 MG/DL (ref 6–20)
CALCIUM SERPL-MCNC: 9.4 MG/DL (ref 8.6–10)
CHLORIDE SERPL-SCNC: 101 MMOL/L (ref 98–107)
CREAT SERPL-MCNC: 0.67 MG/DL (ref 0.51–0.95)
DEPRECATED HCO3 PLAS-SCNC: 25 MMOL/L (ref 22–29)
GFR SERPL CREATININE-BSD FRML MDRD: >90 ML/MIN/1.73M2
GLUCOSE SERPL-MCNC: 89 MG/DL (ref 70–99)
HOLD SPECIMEN: NORMAL
POTASSIUM SERPL-SCNC: 4.3 MMOL/L (ref 3.4–5.3)
SODIUM SERPL-SCNC: 137 MMOL/L (ref 136–145)

## 2023-06-05 PROCEDURE — 80048 BASIC METABOLIC PNL TOTAL CA: CPT | Mod: ZL

## 2023-06-05 PROCEDURE — 36415 COLL VENOUS BLD VENIPUNCTURE: CPT | Mod: ZL

## 2023-06-05 PROCEDURE — 99213 OFFICE O/P EST LOW 20 MIN: CPT | Performed by: FAMILY MEDICINE

## 2023-06-05 RX ORDER — CEPHALEXIN 500 MG/1
500 CAPSULE ORAL 3 TIMES DAILY
Qty: 30 CAPSULE | Refills: 0 | Status: SHIPPED | OUTPATIENT
Start: 2023-06-05 | End: 2023-06-15

## 2023-06-05 RX ORDER — LISINOPRIL 20 MG/1
20 TABLET ORAL DAILY
Qty: 90 TABLET | Refills: 3 | Status: SHIPPED | OUTPATIENT
Start: 2023-06-05 | End: 2024-06-14

## 2023-06-05 ASSESSMENT — ANXIETY QUESTIONNAIRES
8. IF YOU CHECKED OFF ANY PROBLEMS, HOW DIFFICULT HAVE THESE MADE IT FOR YOU TO DO YOUR WORK, TAKE CARE OF THINGS AT HOME, OR GET ALONG WITH OTHER PEOPLE?: SOMEWHAT DIFFICULT
GAD7 TOTAL SCORE: 3
IF YOU CHECKED OFF ANY PROBLEMS ON THIS QUESTIONNAIRE, HOW DIFFICULT HAVE THESE PROBLEMS MADE IT FOR YOU TO DO YOUR WORK, TAKE CARE OF THINGS AT HOME, OR GET ALONG WITH OTHER PEOPLE: SOMEWHAT DIFFICULT
6. BECOMING EASILY ANNOYED OR IRRITABLE: MORE THAN HALF THE DAYS
2. NOT BEING ABLE TO STOP OR CONTROL WORRYING: NOT AT ALL
3. WORRYING TOO MUCH ABOUT DIFFERENT THINGS: NOT AT ALL
1. FEELING NERVOUS, ANXIOUS, OR ON EDGE: SEVERAL DAYS
GAD7 TOTAL SCORE: 3
7. FEELING AFRAID AS IF SOMETHING AWFUL MIGHT HAPPEN: NOT AT ALL
5. BEING SO RESTLESS THAT IT IS HARD TO SIT STILL: NOT AT ALL
7. FEELING AFRAID AS IF SOMETHING AWFUL MIGHT HAPPEN: NOT AT ALL
4. TROUBLE RELAXING: NOT AT ALL
GAD7 TOTAL SCORE: 3

## 2023-06-05 ASSESSMENT — PATIENT HEALTH QUESTIONNAIRE - PHQ9
10. IF YOU CHECKED OFF ANY PROBLEMS, HOW DIFFICULT HAVE THESE PROBLEMS MADE IT FOR YOU TO DO YOUR WORK, TAKE CARE OF THINGS AT HOME, OR GET ALONG WITH OTHER PEOPLE: SOMEWHAT DIFFICULT
SUM OF ALL RESPONSES TO PHQ QUESTIONS 1-9: 9
SUM OF ALL RESPONSES TO PHQ QUESTIONS 1-9: 9

## 2023-06-05 ASSESSMENT — PAIN SCALES - GENERAL: PAINLEVEL: EXTREME PAIN (8)

## 2023-06-05 NOTE — NURSING NOTE
Chief Complaint   Patient presents with     RECHECK     Weight and depression        Medication Reconciliation: complete    Ronel Conner LPN\

## 2023-06-05 NOTE — PROGRESS NOTES
Nursing Notes:   Ronel Conner LPN  6/5/2023 10:55 AM  Signed  Chief Complaint   Patient presents with     RECHECK     Weight and depression        Medication Reconciliation: complete    Ronel Conner LPN\          Subjective   Lyn is a 22 year old, presenting for the following health issues:  RECHECK (Weight and depression )        6/5/2023    10:54 AM   Additional Questions   Roomed by Ronel Conner   Accompanied by self     Lyn is here today for follow up.  She has been dealing with elevated blood pressure.  Ather last visit, we had started her on lisinopril 10 mg daily.  Her mom has had significant issues with hyponatremia and hypokalemia on hydrochlorothiazide, so we have avoided that medication for that reason.  Her headaches have not improved with her blood pressure improving.  Her last eye exam was 3 months ago.  She does have an appointment to see ENT in a week regarding her sinuses.  Has had some chronic congestion symptoms over the past year.  Sometimes has more sinus pressure, particularly with bending over.  Has dizziness with it too.      She had previously been on zoloft for anxiety and depression.  She had gained weight while she was on it and also had developed elevated blood pressures during that time and was therefore weaned off of this and is now on Wellbutrin XL instead.      History of Present Illness       Mental Health Follow-up:  Patient presents to follow-up on Depression & Anxiety.Patient's depression since last visit has been:  Medium  The patient is not having other symptoms associated with depression.  Patient's anxiety since last visit has been:  Medium  The patient is not having other symptoms associated with anxiety.  Any significant life events: job concerns, financial concerns and health concerns  Patient is not feeling anxious or having panic attacks.  Patient has no concerns about alcohol or drug use.    Hypertension: She presents for follow up of hypertension.   "She does check blood pressure  regularly outside of the clinic. Outpatient blood pressures have not been over 140/90. She follows a low salt diet.     Headaches:   Since the patient's last clinic visit, headaches are: worsened  The patient is getting headaches:  Daily  She is not able to do normal daily activities when she has a migraine.  The patient is taking the following rescue/relief medications:  Ibuprofen (Advil, Motrin), Naproxyn (Aleve), Tylenol and Excedrin   Patient states \"I get no relief\" from the rescue/relief medications.   The patient is taking the following medications to prevent migraines:  No medications to prevent migraines  In the past 4 weeks, the patient has gone to an Urgent Care or Emergency Room 0 times times due to headaches.    She eats 2-3 servings of fruits and vegetables daily.She consumes 0 sweetened beverage(s) daily.She exercises with enough effort to increase her heart rate 9 or less minutes per day.  She exercises with enough effort to increase her heart rate 3 or less days per week.   She is taking medications regularly.    Today's PHQ-9         PHQ-9 Total Score: 9    PHQ-9 Q9 Thoughts of better off dead/self-harm past 2 weeks :   Not at all    How difficult have these problems made it for you to do your work, take care of things at home, or get along with other people: Somewhat difficult  Today's RAHAT-7 Score: 3         11/10/2022     2:22 PM 12/12/2022     7:44 AM 6/5/2023    10:48 AM   PHQ   PHQ-9 Total Score 13    13 7 9   Q9: Thoughts of better off dead/self-harm past 2 weeks Not at all    Not at all Not at all Not at all         11/10/2022     2:22 PM 12/12/2022     7:45 AM 6/5/2023    10:53 AM   RAHAT-7 SCORE   Total Score 6 (mild anxiety) 1 (minimal anxiety) 3 (minimal anxiety)   Total Score 6    6 1 3         Follow-up         Review of Systems   Constitutional, HEENT, cardiovascular, pulmonary, GI, , musculoskeletal, neuro, skin, endocrine and psych systems are " negative, except as otherwise noted.      Objective    /88   Pulse 89   Temp 97.8  F (36.6  C) (Tympanic)   Resp 16   Wt 97.1 kg (214 lb)   LMP 05/19/2023 (Approximate)   SpO2 97%   Breastfeeding No   BMI 36.73 kg/m    Body mass index is 36.73 kg/m .  Physical Exam  Constitutional:       Appearance: Normal appearance.   HENT:      Head: Normocephalic and atraumatic.      Right Ear: Tympanic membrane normal.      Left Ear: Tympanic membrane normal.      Nose: Congestion and rhinorrhea present.      Mouth/Throat:      Mouth: Mucous membranes are moist.      Pharynx: Oropharynx is clear. No oropharyngeal exudate or posterior oropharyngeal erythema.   Eyes:      Extraocular Movements: Extraocular movements intact.      Pupils: Pupils are equal, round, and reactive to light.   Cardiovascular:      Rate and Rhythm: Normal rate and regular rhythm.      Heart sounds: Normal heart sounds. No murmur heard.  Pulmonary:      Effort: Pulmonary effort is normal.      Breath sounds: Normal breath sounds. No wheezing, rhonchi or rales.   Musculoskeletal:      Cervical back: Normal range of motion and neck supple.      Right lower leg: No edema.      Left lower leg: No edema.   Lymphadenopathy:      Cervical: No cervical adenopathy.   Neurological:      Mental Status: She is alert.   Psychiatric:         Mood and Affect: Mood normal.         Behavior: Behavior normal.        Results for orders placed or performed in visit on 06/05/23   Basic Metabolic Panel     Status: Normal   Result Value Ref Range    Sodium 137 136 - 145 mmol/L    Potassium 4.3 3.4 - 5.3 mmol/L    Chloride 101 98 - 107 mmol/L    Carbon Dioxide (CO2) 25 22 - 29 mmol/L    Anion Gap 11 7 - 15 mmol/L    Urea Nitrogen 9.7 6.0 - 20.0 mg/dL    Creatinine 0.67 0.51 - 0.95 mg/dL    Calcium 9.4 8.6 - 10.0 mg/dL    Glucose 89 70 - 99 mg/dL    GFR Estimate >90 >60 mL/min/1.73m2   Extra Tube     Status: None    Narrative    The following orders were created for  panel order Extra Tube.  Procedure                               Abnormality         Status                     ---------                               -----------         ------                     Extra Serum Separator Tu...[[id number]]                      Final result                 Please view results for these tests on the individual orders.   Extra Serum Separator Tube (SST)     Status: None   Result Value Ref Range    Hold Specimen JI             Assessment & Plan     ICD-10-CM    1. Primary hypertension  I10 lisinopril (ZESTRIL) 20 MG tablet      2. Chronic frontal sinusitis  J32.1 cephALEXin (KEFLEX) 500 MG capsule      3. Anxiety and depression  F41.9     F32.A         1.  Blood pressure is slightly elevated still, but improved.  Will increase lisinopril to 20 mg daily.  Plan to follow up in a month to recheck blood pressure and Basic Metabolic Profile.  2.  With the headaches and sinus symptoms she is having, sinusitis may be playing a role.  Given duration of symptoms, will have her try a round of keflex to see if this helps.  She also has an appointment with ENT in a week.  3.  She feels that she is stable on her current dose of Wellbutrin XL and wishes to stay on her current dose.         Encouraged regular exercise.     Return in about 1 month (around 7/5/2023) for Hypertension.    Narcisa Steve MD  [facility]

## 2023-06-12 ENCOUNTER — OFFICE VISIT (OUTPATIENT)
Dept: OTOLARYNGOLOGY | Facility: OTHER | Age: 23
End: 2023-06-12
Attending: NURSE PRACTITIONER
Payer: COMMERCIAL

## 2023-06-12 VITALS
SYSTOLIC BLOOD PRESSURE: 130 MMHG | OXYGEN SATURATION: 97 % | HEIGHT: 64 IN | TEMPERATURE: 98.6 F | BODY MASS INDEX: 35.85 KG/M2 | DIASTOLIC BLOOD PRESSURE: 70 MMHG | WEIGHT: 210 LBS | HEART RATE: 98 BPM

## 2023-06-12 DIAGNOSIS — R09.81 CHRONIC NASAL CONGESTION: Primary | ICD-10-CM

## 2023-06-12 DIAGNOSIS — R51.9 DAILY HEADACHE: ICD-10-CM

## 2023-06-12 DIAGNOSIS — J32.4 CHRONIC PANSINUSITIS: ICD-10-CM

## 2023-06-12 PROCEDURE — 31575 DIAGNOSTIC LARYNGOSCOPY: CPT | Performed by: NURSE PRACTITIONER

## 2023-06-12 PROCEDURE — 36415 COLL VENOUS BLD VENIPUNCTURE: CPT | Performed by: NURSE PRACTITIONER

## 2023-06-12 PROCEDURE — 99213 OFFICE O/P EST LOW 20 MIN: CPT | Mod: 25 | Performed by: NURSE PRACTITIONER

## 2023-06-12 PROCEDURE — 86003 ALLG SPEC IGE CRUDE XTRC EA: CPT | Mod: 90 | Performed by: NURSE PRACTITIONER

## 2023-06-12 PROCEDURE — 82785 ASSAY OF IGE: CPT | Mod: 90 | Performed by: NURSE PRACTITIONER

## 2023-06-12 ASSESSMENT — PAIN SCALES - GENERAL: PAINLEVEL: SEVERE PAIN (7)

## 2023-06-12 NOTE — PROGRESS NOTES
Otolaryngology Note         Chief Complaint:     Patient presents with:  Nasal Congestion: Referred by Narcisa Jeffers            History of Present Illness:     Lyn Ny is a 22 year old female seen today for nasal congestion and headache.      Associated symptoms = ear itching, nasal congestion, runny nose, mouth breathing.     She has noted headache that start up the back of the neck, wraps around the temples and into the back of the eyes.  She feels pressure behind her eyes.  Headache pain feels sharp like tension.  She takes tylenol ibuprofen, excedrin.     Pain level today 7/10.    She is currently on keflex for sinusitis without improvement.    She started on Wellbutrin about 2 months ago.  She was previously on Zoloft, was having weight gain so she was switched to Wellbutrin.  This has helped with weight gain.    She has not previously been on abortive or preventative migraine therapy  They currently have 3 cats and 2 dogs. 10 chickens that were in the house but have since move to the coup.  + nausea all the time.   + motion sickness - nausea has worsened over the past 2 months.   No family history of migraines.    Not pregnant.    She does not snore  Has been waking during the night, has also been having trouble falling asleep.    She cannot identify what is waking her up.  No etoh  No smoking  + heartburn - comes and goes depending on food   No recent head injury  No change in major stressors  She has been on leave from her job since April 28 due to headache and nausea  She lays down to treat headaches, nothing else helps relieve.    She has headache every day in the past month.   + history of TMJ, has worn  in the past, also used muscle relaxer.      Treatments have included: flonase - tried for at least a month and didn't notice improvement so she quit.  Did not have an increase in headache.      She takes benadryl intermittently, Claritin inconsistently.     Mild chronic  cough  Reactive airway - uses albuterol at night as needed.  If she doesn't use for a long time she gets a cough.  She does have some wheezing at times.     No history of sinus injury/trauma/surgery  No dysphagia, but does have a thickened, narrow feeling in the throat.  Feels like friction with swallowing.    Rash/sensitive skin - no  No history of seasonal allergies -   She has noted some mild reactive airway in spring last 2 years  No history of previous allergy testing  + tonsillectomy a couple of years ago.     MRI brain with and without contrast completed 5/17/2023:  Impression: Normal MR appearance of the brain.  Right-sided predominant TMJ degeneration.  Paranasal sinus mucosal disease.     Resides in house with a basement.  There is some water and mold with leak in the corner of the basement.  Heat source in home: radiator heat  Pets: 3 cats 2 dogs         Medications:     Current Outpatient Rx   Medication Sig Dispense Refill     acetaminophen (TYLENOL) 325 MG tablet Take 650 mg by mouth       albuterol (PROAIR HFA/PROVENTIL HFA/VENTOLIN HFA) 108 (90 Base) MCG/ACT inhaler INHALE 2 PUFFS INTO THE LUNGS EVERY 4 HOURS AS NEEDED FOR SHORTNESS OF BREATH / DYSPNEA OR WHEEZING 18 g 11     buPROPion (WELLBUTRIN XL) 150 MG 24 hr tablet Take 1 tablet (150 mg) by mouth every morning 903 tablet 3     cephALEXin (KEFLEX) 500 MG capsule Take 1 capsule (500 mg) by mouth 3 times daily for 10 days 30 capsule 0     lisinopril (ZESTRIL) 20 MG tablet Take 1 tablet (20 mg) by mouth daily 90 tablet 3     norgestrel-ethinyl estradiol (LO/OVRAL) 0.3-30 MG-MCG tablet Take 1 tablet by mouth daily 84 tablet 4     ondansetron (ZOFRAN ODT) 4 MG ODT tab Take 1 tablet (4 mg) by mouth every 8 hours as needed for nausea or vomiting 30 tablet 0     other medical supplies [employer] blood pressure cuff & meter.  Diagnosis:  I10.  Length of need:  99. 1 each 0            Allergies:     Allergies: Azithromycin, Amoxicillin, Doxycycline, Prozac  "[fluoxetine], Rifampin, and Hydrocodone          Past Medical History:     Past Medical History:   Diagnosis Date     Spondylolysis of lumbar region     No Comments Provided            Past Surgical History:     Past Surgical History:   Procedure Laterality Date     Lumbar fusion of L5  8/2/3017    subsequent infection       ENT family history reviewed         Social History:     Social History     Tobacco Use     Smoking status: Never     Passive exposure: Never     Smokeless tobacco: Never     Tobacco comments:     Quit smoking: Mom smokes outside   Vaping Use     Vaping status: Never Used     Passive vaping exposure: Yes   Substance Use Topics     Alcohol use: No     Drug use: Never     Comment: Drug use: No            Review of Systems:     ROS: See HPI         Physical Exam:     /70 (BP Location: Right arm, Cuff Size: Adult Large)   Pulse 98   Temp 98.6  F (37  C) (Tympanic)   Ht 1.626 m (5' 4\")   Wt 95.3 kg (210 lb)   LMP 05/19/2023 (Approximate)   SpO2 97%   BMI 36.05 kg/m      General - The patient is well nourished and well developed, and appears to have good nutritional status.    Head and Face - Normocephalic and atraumatic, with no gross asymmetry noted.  The facial nerve is intact, with strong symmetric movements.  Voice and Breathing - The patient was breathing comfortably without the use of accessory muscles. There was no wheezing, stridor, or stertor.  The patients voice was clear and strong, and had appropriate pitch and quality.  Ears -The external auditory canals are patent, the tympanic membranes are intact without effusion, retraction or mass.  Bony landmarks are intact.  Eyes - Extraocular movements intact, sclera were not icteric or injected, conjunctiva were pink and moist.  Mouth - Examination of the oral cavity showed pink, healthy oral mucosa. No lesions or ulcerations noted.  The tongue was mobile and midline, and the dentition were in good condition.    Throat - The walls of " the oropharynx were smooth, pink, moist, symmetric, and had no lesions or ulcerations.  The tonsillar pillars and soft palate were symmetric.  The uvula was midline on elevation.    Neck - No worrisome lymphadenopathy.   Palpation of the thyroid was soft and smooth, with no nodules or goiter appreciated.  The trachea was mobile and midline.  Nose - External contour is symmetric, no gross deflection or scars.  The nasal passages are examined with nasal speculum.   Nasal mucosa is pink and moist with no abnormal mucus.  The septum was intact and the turbinates are examined with nasal speculum, no polyps, masses, or purulence noted on examination of anterior nasal cavity.     Attempts at mirror laryngoscopy were not possible due to gag reflex.  Therefore I proceeded with a fiberoptic examination after informed consent.  First I sprayed both sides of the nose with a mixture of lidocaine and neosynephrine.  I then passed the scope through the nasal cavity.     The nasal cavity was remarkable for moderately enlarged bilateral inferior turbinates, right septal spur.    The nasopharynx was mucosally covered and symmetric.  The eustachian tube openings were unobstructed.  Going further down I had a clear view of the base of tongue which had normal appearing lingual tonsillar tissue.  The base of tongue was free of lesions, and the vallecula was open.  The epiglottis was smooth and mucosally covered.  The supraglottic larynx was then clearly visualized.  The vocal cords moved smoothly and symmetrically and were pearly white.  The pyriform sinuses were open and without abigail mass or pooling of secretions upon valsalva, and the limited view of the postcricoid region did not show any lesions.  The patient tolerated the procedure well.           Assessment and Plan:       ICD-10-CM    1. Chronic nasal congestion  R09.81 Adult ENT  Referral     Inhalent Panel [place] ([facility]ab)     Total IgE (Serolab)     CT Sinus w/o  Contrast     Inhalent Panel [place] (Serolab)     Total IgE (Serolab)        Louis Med Nasal Saline  Use high volume louis med saline irrigation.  Use warm distilled water and 2 packets of the salt solution that comes with the bottle, dissolve in bottle up to 240 ml timbo.  Irrigate each side of your nose leaning over the sink, using 1/3 to 1/2 the volume of the bottle in each nostril every irrigation.  Irrigate 5 times daily and as needed.    Recommend starting daily antihistamine and steroid nasal spray.    Complete CT Sinus scan to be done in [place].  Someone will call you to schedule.  We will call you with results  Ordered Environmental allergy test to be done today in lab, we will call you with results, expect 2 to 3 weeks for processing.    Nadine AGOSTO  [facility]

## 2023-06-12 NOTE — LETTER
6/12/2023         RE: Lyn Ny  [address] [place] [address]        Dear Colleague,    Thank you for referring your patient, Lyn Ny, to the [facility]. Please see a copy of my visit note below.    Otolaryngology Note         Chief Complaint:     Patient presents with:  Nasal Congestion: Referred by Narcisa Jeffers            History of Present Illness:     Lyn Ny is a 22 year old female seen today for nasal congestion and headache.      Associated symptoms = ear itching, nasal congestion, runny nose, mouth breathing.     She has noted headache that start up the back of the neck, wraps around the temples and into the back of the eyes.  She feels pressure behind her eyes.  Headache pain feels sharp like tension.  She takes tylenol ibuprofen, excedrin.     Pain level today 7/10.    She is currently on keflex for sinusitis without improvement.    She started on Wellbutrin about 2 months ago.  She was previously on Zoloft, was having weight gain so she was switched to Wellbutrin.  This has helped with weight gain.    She has not previously been on abortive or preventative migraine therapy  They currently have 3 cats and 2 dogs. 10 chickens that were in the house but have since move to the coup.  + nausea all the time.   + motion sickness - nausea has worsened over the past 2 months.   No family history of migraines.    Not pregnant.    She does not snore  Has been waking during the night, has also been having trouble falling asleep.    She cannot identify what is waking her up.  No etoh  No smoking  + heartburn - comes and goes depending on food   No recent head injury  No change in major stressors  She has been on leave from her job since April 28 due to headache and nausea  She lays down to treat headaches, nothing else helps relieve.    She has headache every day in the past month.   + history of TMJ, has worn  in the past, also used muscle  relaxer.      Treatments have included: flonase - tried for at least a month and didn't notice improvement so she quit.  Did not have an increase in headache.      She takes benadryl intermittently, Claritin inconsistently.     Mild chronic cough  Reactive airway - uses albuterol at night as needed.  If she doesn't use for a long time she gets a cough.  She does have some wheezing at times.     No history of sinus injury/trauma/surgery  No dysphagia, but does have a thickened, narrow feeling in the throat.  Feels like friction with swallowing.    Rash/sensitive skin - no  No history of seasonal allergies -   She has noted some mild reactive airway in spring last 2 years  No history of previous allergy testing  + tonsillectomy a couple of years ago.     MRI brain with and without contrast completed 5/17/2023:  Impression: Normal MR appearance of the brain.  Right-sided predominant TMJ degeneration.  Paranasal sinus mucosal disease.     Resides in house with a basement.  There is some water and mold with leak in the corner of the basement.  Heat source in home: radiator heat  Pets: 3 cats 2 dogs         Medications:     Current Outpatient Rx   Medication Sig Dispense Refill     acetaminophen (TYLENOL) 325 MG tablet Take 650 mg by mouth       albuterol (PROAIR HFA/PROVENTIL HFA/VENTOLIN HFA) 108 (90 Base) MCG/ACT inhaler INHALE 2 PUFFS INTO THE LUNGS EVERY 4 HOURS AS NEEDED FOR SHORTNESS OF BREATH / DYSPNEA OR WHEEZING 18 g 11     buPROPion (WELLBUTRIN XL) 150 MG 24 hr tablet Take 1 tablet (150 mg) by mouth every morning 903 tablet 3     cephALEXin (KEFLEX) 500 MG capsule Take 1 capsule (500 mg) by mouth 3 times daily for 10 days 30 capsule 0     lisinopril (ZESTRIL) 20 MG tablet Take 1 tablet (20 mg) by mouth daily 90 tablet 3     norgestrel-ethinyl estradiol (LO/OVRAL) 0.3-30 MG-MCG tablet Take 1 tablet by mouth daily 84 tablet 4     ondansetron (ZOFRAN ODT) 4 MG ODT tab Take 1 tablet (4 mg) by mouth every 8 hours as  "needed for nausea or vomiting 30 tablet 0     other medical supplies Omron blood pressure cuff & meter.  Diagnosis:  I10.  Length of need:  99. 1 each 0            Allergies:     Allergies: Azithromycin, Amoxicillin, Doxycycline, Prozac [fluoxetine], Rifampin, and Hydrocodone          Past Medical History:     Past Medical History:   Diagnosis Date     Spondylolysis of lumbar region     No Comments Provided            Past Surgical History:     Past Surgical History:   Procedure Laterality Date     Lumbar fusion of L5  8/2/3017    subsequent infection       ENT family history reviewed         Social History:     Social History     Tobacco Use     Smoking status: Never     Passive exposure: Never     Smokeless tobacco: Never     Tobacco comments:     Quit smoking: Mom smokes outside   Vaping Use     Vaping status: Never Used     Passive vaping exposure: Yes   Substance Use Topics     Alcohol use: No     Drug use: Never     Comment: Drug use: No            Review of Systems:     ROS: See HPI         Physical Exam:     /70 (BP Location: Right arm, Cuff Size: Adult Large)   Pulse 98   Temp 98.6  F (37  C) (Tympanic)   Ht 1.626 m (5' 4\")   Wt 95.3 kg (210 lb)   LMP 05/19/2023 (Approximate)   SpO2 97%   BMI 36.05 kg/m      General - The patient is well nourished and well developed, and appears to have good nutritional status.    Head and Face - Normocephalic and atraumatic, with no gross asymmetry noted.  The facial nerve is intact, with strong symmetric movements.  Voice and Breathing - The patient was breathing comfortably without the use of accessory muscles. There was no wheezing, stridor, or stertor.  The patients voice was clear and strong, and had appropriate pitch and quality.  Ears -The external auditory canals are patent, the tympanic membranes are intact without effusion, retraction or mass.  Bony landmarks are intact.  Eyes - Extraocular movements intact, sclera were not icteric or injected, " conjunctiva were pink and moist.  Mouth - Examination of the oral cavity showed pink, healthy oral mucosa. No lesions or ulcerations noted.  The tongue was mobile and midline, and the dentition were in good condition.    Throat - The walls of the oropharynx were smooth, pink, moist, symmetric, and had no lesions or ulcerations.  The tonsillar pillars and soft palate were symmetric.  The uvula was midline on elevation.    Neck - No worrisome lymphadenopathy.   Palpation of the thyroid was soft and smooth, with no nodules or goiter appreciated.  The trachea was mobile and midline.  Nose - External contour is symmetric, no gross deflection or scars.  The nasal passages are examined with nasal speculum.   Nasal mucosa is pink and moist with no abnormal mucus.  The septum was intact and the turbinates are examined with nasal speculum, no polyps, masses, or purulence noted on examination of anterior nasal cavity.     Attempts at mirror laryngoscopy were not possible due to gag reflex.  Therefore I proceeded with a fiberoptic examination after informed consent.  First I sprayed both sides of the nose with a mixture of lidocaine and neosynephrine.  I then passed the scope through the nasal cavity.     The nasal cavity was remarkable for moderately enlarged bilateral inferior turbinates, right septal spur.    The nasopharynx was mucosally covered and symmetric.  The eustachian tube openings were unobstructed.  Going further down I had a clear view of the base of tongue which had normal appearing lingual tonsillar tissue.  The base of tongue was free of lesions, and the vallecula was open.  The epiglottis was smooth and mucosally covered.  The supraglottic larynx was then clearly visualized.  The vocal cords moved smoothly and symmetrically and were pearly white.  The pyriform sinuses were open and without abigail mass or pooling of secretions upon valsalva, and the limited view of the postcricoid region did not show any  lesions.  The patient tolerated the procedure well.           Assessment and Plan:       ICD-10-CM    1. Chronic nasal congestion  R09.81 Adult ENT  Referral     Inhalent Panel [place] Region (Serolab)     Total IgE (Serolab)     CT Sinus w/o Contrast     Inhalent Panel [place] (Serolab)     Total IgE (Serolab)        Louis Med Nasal Saline  Use high volume louis med saline irrigation.  Use warm distilled water and 2 packets of the salt solution that comes with the bottle, dissolve in bottle up to 240 ml timbo.  Irrigate each side of your nose leaning over the sink, using 1/3 to 1/2 the volume of the bottle in each nostril every irrigation.  Irrigate 5 times daily and as needed.    Recommend starting daily antihistamine and steroid nasal spray.    Complete CT Sinus scan to be done in [place].  Someone will call you to schedule.  We will call you with results  Ordered Environmental allergy test to be done today in lab, we will call you with results, expect 2 to 3 weeks for processing.    Nadine AGOSTO  [facility] ENT          Again, thank you for allowing me to participate in the care of your patient.        Sincerely,        Nadine Caban NP

## 2023-06-12 NOTE — PATIENT INSTRUCTIONS
Thank you for allowing Nadine Mee and our ENT team to participate in your care.  If your medications are too expensive, please give the nurse a call.  We can possibly change this medication.  If you have a scheduling or an appointment question please contact our Health Unit Coordinator at their direct line [phone] ext [phone].   ALL nursing questions or concerns can be directed to your ENT nurse at: [phone] - Xkr     Louis Med Nasal Saline  Use high volume louis med saline irrigation.  Use warm distilled water and 2 packets of the salt solution that comes with the bottle, dissolve in bottle up to 240 ml timbo.  Irrigate each side of your nose leaning over the sink, using 1/3 to 1/2 the volume of the bottle in each nostril every irrigation.  Irrigate 5 times daily and as needed.      Ordered CT Sinus scan to be done in [place].  Someone will call you to schedule.  They will need to send results to us so nurse can call you with results.     Ordered Environmental allergy test to be done today in lab

## 2023-06-14 ENCOUNTER — MYC MEDICAL ADVICE (OUTPATIENT)
Dept: FAMILY MEDICINE | Facility: OTHER | Age: 23
End: 2023-06-14
Payer: COMMERCIAL

## 2023-06-15 NOTE — TELEPHONE ENCOUNTER
I have not seen anything faxed.  I'm out of the office until 6/27/23.  I would need to see her to address these concerns.  Please help her get scheduled.  Ok to use a same day spot when available.  Narcisa Steve MD

## 2023-06-22 ENCOUNTER — HOSPITAL ENCOUNTER (OUTPATIENT)
Dept: CT IMAGING | Facility: OTHER | Age: 23
Discharge: HOME | End: 2023-06-22
Attending: NURSE PRACTITIONER | Admitting: NURSE PRACTITIONER
Payer: COMMERCIAL

## 2023-06-22 ENCOUNTER — HOSPITAL ENCOUNTER (EMERGENCY)
Facility: OTHER | Age: 23
Discharge: HOME | End: 2023-06-22
Attending: INTERNAL MEDICINE | Admitting: INTERNAL MEDICINE
Payer: COMMERCIAL

## 2023-06-22 VITALS
HEART RATE: 72 BPM | TEMPERATURE: 97.4 F | DIASTOLIC BLOOD PRESSURE: 79 MMHG | OXYGEN SATURATION: 100 % | HEIGHT: 64 IN | SYSTOLIC BLOOD PRESSURE: 126 MMHG | WEIGHT: 207 LBS | RESPIRATION RATE: 18 BRPM | BODY MASS INDEX: 35.34 KG/M2

## 2023-06-22 DIAGNOSIS — R09.81 CHRONIC NASAL CONGESTION: ICD-10-CM

## 2023-06-22 DIAGNOSIS — G43.009 MIGRAINE WITHOUT AURA AND WITHOUT STATUS MIGRAINOSUS, NOT INTRACTABLE: ICD-10-CM

## 2023-06-22 DIAGNOSIS — R09.81 CONGESTION OF PARANASAL SINUS: ICD-10-CM

## 2023-06-22 LAB
BASOPHILS # BLD AUTO: 0 10E3/UL (ref 0–0.2)
BASOPHILS NFR BLD AUTO: 0 %
EOSINOPHIL # BLD AUTO: 0.1 10E3/UL (ref 0–0.7)
EOSINOPHIL NFR BLD AUTO: 1 %
ERYTHROCYTE [DISTWIDTH] IN BLOOD BY AUTOMATED COUNT: 12.2 % (ref 10–15)
HCT VFR BLD AUTO: 43.5 % (ref 35–47)
HGB BLD-MCNC: 15 G/DL (ref 11.7–15.7)
HOLD SPECIMEN: NORMAL
HOLD SPECIMEN: NORMAL
IMM GRANULOCYTES # BLD: 0 10E3/UL
IMM GRANULOCYTES NFR BLD: 0 %
LYMPHOCYTES # BLD AUTO: 1.2 10E3/UL (ref 0.8–5.3)
LYMPHOCYTES NFR BLD AUTO: 14 %
MCH RBC QN AUTO: 29.8 PG (ref 26.5–33)
MCHC RBC AUTO-ENTMCNC: 34.5 G/DL (ref 31.5–36.5)
MCV RBC AUTO: 87 FL (ref 78–100)
MONOCYTES # BLD AUTO: 0.5 10E3/UL (ref 0–1.3)
MONOCYTES NFR BLD AUTO: 6 %
NEUTROPHILS # BLD AUTO: 6.9 10E3/UL (ref 1.6–8.3)
NEUTROPHILS NFR BLD AUTO: 79 %
NRBC # BLD AUTO: 0 10E3/UL
NRBC BLD AUTO-RTO: 0 /100
PLATELET # BLD AUTO: 329 10E3/UL (ref 150–450)
RBC # BLD AUTO: 5.03 10E6/UL (ref 3.8–5.2)
VIT B12 SERPL-MCNC: >2000 PG/ML (ref 232–1245)
WBC # BLD AUTO: 8.7 10E3/UL (ref 4–11)

## 2023-06-22 PROCEDURE — 258N000003 HC RX IP 258 OP 636: Performed by: INTERNAL MEDICINE

## 2023-06-22 PROCEDURE — 99284 EMERGENCY DEPT VISIT MOD MDM: CPT | Mod: 25 | Performed by: INTERNAL MEDICINE

## 2023-06-22 PROCEDURE — 250N000011 HC RX IP 250 OP 636: Mod: JZ | Performed by: INTERNAL MEDICINE

## 2023-06-22 PROCEDURE — 85014 HEMATOCRIT: CPT | Performed by: INTERNAL MEDICINE

## 2023-06-22 PROCEDURE — 86003 ALLG SPEC IGE CRUDE XTRC EA: CPT | Performed by: INTERNAL MEDICINE

## 2023-06-22 PROCEDURE — 82306 VITAMIN D 25 HYDROXY: CPT | Performed by: INTERNAL MEDICINE

## 2023-06-22 PROCEDURE — 82607 VITAMIN B-12: CPT | Performed by: INTERNAL MEDICINE

## 2023-06-22 PROCEDURE — 96361 HYDRATE IV INFUSION ADD-ON: CPT | Performed by: INTERNAL MEDICINE

## 2023-06-22 PROCEDURE — 96374 THER/PROPH/DIAG INJ IV PUSH: CPT | Performed by: INTERNAL MEDICINE

## 2023-06-22 PROCEDURE — 96372 THER/PROPH/DIAG INJ SC/IM: CPT | Performed by: INTERNAL MEDICINE

## 2023-06-22 PROCEDURE — 99284 EMERGENCY DEPT VISIT MOD MDM: CPT | Performed by: INTERNAL MEDICINE

## 2023-06-22 PROCEDURE — 96375 TX/PRO/DX INJ NEW DRUG ADDON: CPT | Performed by: INTERNAL MEDICINE

## 2023-06-22 PROCEDURE — 36415 COLL VENOUS BLD VENIPUNCTURE: CPT | Performed by: INTERNAL MEDICINE

## 2023-06-22 PROCEDURE — 70486 CT MAXILLOFACIAL W/O DYE: CPT

## 2023-06-22 RX ORDER — PROCHLORPERAZINE MALEATE 10 MG
10 TABLET ORAL EVERY 6 HOURS PRN
Qty: 30 TABLET | Refills: 1 | Status: SHIPPED | OUTPATIENT
Start: 2023-06-22 | End: 2024-02-07

## 2023-06-22 RX ORDER — FEXOFENADINE HCL 180 MG/1
180 TABLET ORAL DAILY
Qty: 90 TABLET | Refills: 1 | Status: SHIPPED | OUTPATIENT
Start: 2023-06-22 | End: 2024-02-27

## 2023-06-22 RX ORDER — MONTELUKAST SODIUM 10 MG/1
10 TABLET ORAL AT BEDTIME
Qty: 90 TABLET | Refills: 1 | Status: SHIPPED | OUTPATIENT
Start: 2023-06-22 | End: 2023-12-15

## 2023-06-22 RX ORDER — MOMETASONE FUROATE MONOHYDRATE 50 UG/1
2 SPRAY, METERED NASAL DAILY
Qty: 51 G | Refills: 1 | Status: SHIPPED | OUTPATIENT
Start: 2023-06-22 | End: 2024-04-23

## 2023-06-22 RX ORDER — CYANOCOBALAMIN (VITAMIN B-12) 2500 MCG
2500 TABLET, SUBLINGUAL SUBLINGUAL DAILY
Qty: 100 TABLET | Refills: 1 | Status: SHIPPED | OUTPATIENT
Start: 2023-06-22 | End: 2024-04-23

## 2023-06-22 RX ORDER — CYANOCOBALAMIN 1000 UG/ML
1000 INJECTION, SOLUTION INTRAMUSCULAR; SUBCUTANEOUS ONCE
Status: COMPLETED | OUTPATIENT
Start: 2023-06-22 | End: 2023-06-22

## 2023-06-22 RX ORDER — KETOROLAC TROMETHAMINE 30 MG/ML
30 INJECTION, SOLUTION INTRAMUSCULAR; INTRAVENOUS ONCE
Status: COMPLETED | OUTPATIENT
Start: 2023-06-22 | End: 2023-06-22

## 2023-06-22 RX ORDER — DIPHENHYDRAMINE HYDROCHLORIDE 50 MG/ML
25 INJECTION INTRAMUSCULAR; INTRAVENOUS ONCE
Status: COMPLETED | OUTPATIENT
Start: 2023-06-22 | End: 2023-06-22

## 2023-06-22 RX ADMIN — SODIUM CHLORIDE 1000 ML: 9 INJECTION, SOLUTION INTRAVENOUS at 19:01

## 2023-06-22 RX ADMIN — KETOROLAC TROMETHAMINE 30 MG: 30 INJECTION, SOLUTION INTRAMUSCULAR; INTRAVENOUS at 19:01

## 2023-06-22 RX ADMIN — PROCHLORPERAZINE EDISYLATE 10 MG: 5 INJECTION INTRAMUSCULAR; INTRAVENOUS at 19:00

## 2023-06-22 RX ADMIN — CYANOCOBALAMIN 1000 MCG: 1000 INJECTION, SOLUTION INTRAMUSCULAR at 19:00

## 2023-06-22 RX ADMIN — DIPHENHYDRAMINE HYDROCHLORIDE 25 MG: 50 INJECTION, SOLUTION INTRAMUSCULAR; INTRAVENOUS at 19:00

## 2023-06-22 ASSESSMENT — ACTIVITIES OF DAILY LIVING (ADL): ADLS_ACUITY_SCORE: 35

## 2023-06-22 NOTE — ED TRIAGE NOTES
"Patient here with dizziness, nausea and headache for 8 weeks and worse today since 1530.  Patient states she has seen her PCP multiple times recently for on going issues.  Patient did have a CT scan today for her sinus problems and patient states she thought she was going to pass out prior to the CT scan.  /79   Pulse 74   Temp (!) 96  F (35.6  C) (Tympanic)   Resp 18   Ht 1.626 m (5' 4\")   Wt 93.9 kg (207 lb)   LMP 05/19/2023 (Approximate)   SpO2 100%   BMI 35.53 kg/m         Triage Assessment     Row Name 06/22/23 1713       Triage Assessment (Adult)    Airway WDL WDL       Respiratory WDL    Respiratory WDL WDL       Skin Circulation/Temperature WDL    Skin Circulation/Temperature WDL WDL       Cardiac WDL    Cardiac WDL WDL       Peripheral/Neurovascular WDL    Peripheral Neurovascular WDL WDL       Cognitive/Neuro/Behavioral WDL    Cognitive/Neuro/Behavioral WDL X              "

## 2023-06-22 NOTE — ED PROVIDER NOTES
Emergency Department Provider Note  : 2000 Age: 22 year old Sex: female MRN: [id number]    Chief Complaint   Patient presents with     Dizziness       Medical Decision Making / Assessment / Plan   22 year old female presenting with paranasal sinus congestion, allergies, asthma, migraine headaches    ED Course as of 23   Thu 2023   9307 Patient evaluated.  Headache with mucosal disease.  Sinus CT completed earlier today.  Having components of migraine headache with light and sound sensitivity.  Ongoing, fairly long-term issues with sinus disease, motion sickness, headaches.  Saw ENT recently.  Concern for possible gluten sensitivity.  She would like some lab work today.  To help with headache and nausea - IV Compazine, Benadryl, Toradol ordered.   IV fluids ordered.  B12 shot ordered.     CBC is normal.    Patient is feeling much better after above interventions.  She would like to discharge home.  Plan to start Emelia pot saline nasal rinse, Nasonex steroid nasal spray, Allegra, Singulair.  Compazine as needed.  B12/B complex oral supplementation.        A shared decision making model was used. Plan and all results were discussed  Time was taken to answer all questions. Patient and/or associated parties understood and were agreeable to treatment plan.  Strict return to Emergency Department precautions as well as appropriate follow up instructions were provided. The patient was discharged in stable condition.    New Prescriptions    FEXOFENADINE (ALLEGRA) 180 MG TABLET    Take 1 tablet (180 mg) by mouth daily    MOMETASONE (NASONEX) 50 MCG/ACT NASAL SPRAY    Spray 2 sprays into both nostrils daily --- Use after Neti Pot --- OTC okay    MONTELUKAST (SINGULAIR) 10 MG TABLET    Take 1 tablet (10 mg) by mouth At Bedtime    PROCHLORPERAZINE (COMPAZINE) 10 MG TABLET    Take 1 tablet (10 mg) by mouth every 6 hours as needed for nausea or vomiting    VITAMIN B COMPLEX WITH VITAMIN C  (VITAMIN  B COMPLEX) TABLET    Take 1 tablet by mouth daily -- make sure it has B12 in tablet    VITAMIN B-12 (CYANOCOBALAMIN) 2500 MCG SUBLINGUAL TABLET    Take 1 tablet (2,500 mcg) by mouth daily       Final diagnoses:   Congestion of paranasal sinus   Migraine without aura and without status migrainosus, not intractable       Jose De Jesus Hatch MD  6/22/2023   Emergency Department    Subjective   Lyn is a 22 year old female who presents at  5:28 PM with frequent headaches, associated with light sensitivity and sound sensitivity.  Also having some issues with dizziness and nausea and vomiting.  Has been having frequent headaches for the last 8 weeks or so.  Seem to be a bit worse today since about 3:30 PM.    Has some issues with abdominal pain and constipation times as well.    Frequently gets motion sickness, has a hard time even riding in the car to get from home into town.    Had sinus CT today which showed significant amount of sinus inflammation and irritation.  Final read is pending.    She had MRI completed on 5/17 which showed paranasal sinus disease.  Right TMJ dysfunction and normal brain MRI.    She saw ENT recently who recommended Holloway pot saline rinses, oral antihistamines, nasal steroid sprays.    Patient took some Excedrin tension migraine medication around 2:00 this afternoon and that has helped her headache some but she is having some issues with nausea and ongoing headache that is starting to worsen again.    Family is concerned about the possibility of celiac disease or gluten sensitivity.  They would like some lab work today.    Additionally we discussed medications to help with migraine prevention and treatment.  She would like to try a B12 shot.  Some Compazine Benadryl and Toradol to help with her headache as nausea.    Denies fevers or chills.  No recent asthma issues.  No chest pain.  No shortness of breath.    I have reviewed the Medications, Allergies, Past Medical and Surgical  "History, and Social History in the [facility] and with family.    Review of Systems:  Please see Subjective / HPI for pertinent positives and negatives. All other systems reviewed and found to be negative.      Objective     Patient Vitals for the past 24 hrs:   BP Temp Temp src Pulse Resp SpO2 Height Weight   06/22/23 1711 125/79 (!) 96  F (35.6  C) Tympanic 74 18 100 % 1.626 m (5' 4\") 93.9 kg (207 lb)       Physical Exam:     General: Awake, alert, in no acute respiratory distress.  Head: Normocephalic, atraumatic.  + Congested paranasal sinuses.  Eyes: Conjugate gaze.  ENT: Moist membranes, external ear appears normal.   Chest/Respiratory: Equal chest rise, clear bilaterally.  Cardiovascular: Peripheral pulses present, regular rate and rhythm.  Abdominal: Soft, non-distended, non-tender.  Extremities: No obvious deformity.  Neurological: GCS 15, moving all extremities without gross deficit.  Skin: Warm, no rashes, lesions, or bruising.   Psychiatric: Appropriate affect.     Procedures / Critical Care   Procedures    Aggregate Critical Care Time: None.     Orders Placed This Encounter   Procedures     Vitamin D Deficiency     Vitamin B12     Allergy adult food panel     CBC with platelets and differential     Extra Tube     Extra Blue Top Tube     Extra Green Top (Lithium Heparin) ON ICE     Peripheral IV catheter     CBC with platelets differential       RESULTS: As noted above.            Medical/Surgical History:  Past Medical History:   Diagnosis Date     Spondylolysis of lumbar region     No Comments Provided     Past Surgical History:   Procedure Laterality Date     Lumbar fusion of L5  8/2/3017    subsequent infection       Medications:  Current Facility-Administered Medications   Medication     ondansetron (ZOFRAN ODT) ODT tab 4 mg     Current Outpatient Medications   Medication     fexofenadine (ALLEGRA) 180 MG tablet     mometasone (NASONEX) 50 MCG/ACT nasal spray     montelukast (SINGULAIR) 10 MG " tablet     prochlorperazine (COMPAZINE) 10 MG tablet     vitamin B complex with vitamin C (VITAMIN  B COMPLEX) tablet     vitamin B-12 (CYANOCOBALAMIN) 2500 MCG sublingual tablet     acetaminophen (TYLENOL) 325 MG tablet     albuterol (PROAIR HFA/PROVENTIL HFA/VENTOLIN HFA) 108 (90 Base) MCG/ACT inhaler     buPROPion (WELLBUTRIN XL) 150 MG 24 hr tablet     lisinopril (ZESTRIL) 20 MG tablet     norgestrel-ethinyl estradiol (LO/OVRAL) 0.3-30 MG-MCG tablet     ondansetron (ZOFRAN ODT) 4 MG ODT tab     other medical supplies       Allergies:  Azithromycin, Amoxicillin, Doxycycline, Prozac [fluoxetine], Rifampin, and Hydrocodone    Relevant labs, images, EKGs, Epic and outside hospital (if applicable) charts were reviewed. The findings, diagnosis, plan, and need for follow up were discussed with the patient/family. Nursing notes were reviewed.      Jose De Jesus Hatch MD  06/22/23 2020

## 2023-06-23 DIAGNOSIS — J32.4 CHRONIC PANSINUSITIS: Primary | ICD-10-CM

## 2023-06-23 LAB — DEPRECATED CALCIDIOL+CALCIFEROL SERPL-MC: 74 UG/L (ref 20–75)

## 2023-06-23 RX ORDER — BUDESONIDE 0.5 MG/2ML
INHALANT ORAL
Qty: 120 ML | Refills: 4 | Status: SHIPPED | OUTPATIENT
Start: 2023-06-23 | End: 2023-07-18

## 2023-06-23 NOTE — RESULT ENCOUNTER NOTE
"\"S\" shaped septum with right sided frontal, maxillary, ethmoid mucosal thickening consistent with chronic sinusitis.  Recommend starting budesonide rinses and follow-up with Dr. Hewitt for possible surgical consult.    Inhalant panel pending, we will call with results.    Budesonide sent to [facility] in [place]."

## 2023-06-23 NOTE — DISCHARGE INSTRUCTIONS
For sinus congestion:     1. Use a high volume Neti Pot/sinus flush (Obi Med Sinus Rinse) - with Distilled Water - to irrigate sinuses/mucosal tissue.     -- Use warm distilled water and 2 packs of the salt solution that comes with the bottle, dissolve in bottle up to 240 mL Beau.  -- Irrigate each side of the nose bending over the sink, using 1/3- to 1/2 the volume of the bottle in each nostril every irrigation.       -- Irrigate 5 times daily and as needed.    2. Start nasal steroids (Nasonex, Flonase, etc.) -- Available over-the-counter, or with prescription. (Administer nasal steroid spray, after performing the saline nasal irrigation)      Use Allegra daily.     Use Singulair every night.     Use compazine if needed for nausea.     Start B12 / B-complex supplements.     See info on migraines below...       Migraine headache -- Sensitive Brain - Doesn't Like Change.  New Drug = Change.     -- review the web site: www.managingmigraine.org    If getting 3 or more times per month --- Start For Migraine Prevention :   - Magnesium 400-800 mg Daily. (Consider Slo-Mag or Slow-Mag)  - Coenzyme Q10 (CO Q-10) 200 mg capsule; Take 1 capsule by mouth 2 times daily.   - Riboflavin (VITAMIN B2) 100 mg tablet; Take 2 tablets by mouth 2 times daily.   + consider Vitamin B12 - (can take in a B-complex)      Strong emphasis on Behavorial Intervention - Stress Management, Regular sleeping habits/schedules, etc.      Only 10 abortive medication treatments should be used per month -- to prevent worsening of your migraines.     If you develop a migraine -- at onset of MILD Migraine Headache Try:     -- Alkaseltzer 3 tablets -or- Ed Advanced Asprin -or- Excedrine Migraine -or- Naproxen 500 mg.  and   -- Magnesium 500 mg -- has a prokinetic effect and can help with nausea.     Taking a nap in a dark room, can also help.     Other options include Tryptans (Also used at onset of Mild Migraine Pain) like:   - Sumitriptan --   -  Naratriptan --  - Rizatriptan -- could schedule x days per month for menstrual migraine    Nausea medications can also be helpful to use when you start to develop a migraine -- including:   - Metoclopramide (if no sedation tolerance)  - Prochloroperazine (if some sedation is okay) - little more effective.         Daily prophylactic medication options -- should be considered if having 3 or more migraine attacks per month -- include:    Timolol, Propanolol, Valproate, Topamax.    Other approved option:   Botox injections    Typically start Topiramate (Topamax) at 12.5 mg daily x2 weeks, then increase to 25 mg for about 1 month, and then can increase to 37.5 mg daily if needed - For Migraine Prevention     Other helpful agents for migraine prevention:  -- 16 mg candesartan daily can also be considered -- is roughly equal to propranolol in migraine prevention.     Membrane stabilizing agents: Valproate, Topiramate, Gabapentin.     No opiates for Migraines = Poison.  They are ineffective, sub-optimal, and cause migraine progression.     Neurological exam during a migraine is Normal.  No NEED for CT scan in the ER.    ER treatments if needed:  Most effective -- IV fluids (Saline) and Toradol.     ER treatments also sometimes used:  Droperidol (inapsine)  Diphenhydramine (premedicates prior to using neuroleptics)  Dihydroergotamine (DHE)  Prochlorperazine  Promethazine -  (Phenergan) - IM Shot  IV valproate - 1 gram IV push  IV Magnesium.     Some potential online pharmacies that are reliable:    [facility]

## 2023-06-26 LAB
ALMOND IGE QN: <0.1 KU(A)/L
CASHEW NUT IGE QN: <0.1 KU(A)/L
CODFISH IGE QN: <0.1 KU(A)/L
COW MILK IGE QN: 1.25 KU(A)/L
EGG WHITE IGE QN: 1.28 KU(A)/L
HAZELNUT IGE QN: <0.1 KU(A)/L
IGE SERPL-ACNC: 287 KU/L (ref 0–114)
PEANUT IGE QN: <0.1 KU(A)/L
SALMON IGE QN: <0.1 KU(A)/L
SCALLOP IGE QN: 0.29 KU(A)/L
SESAME SEED IGE QN: <0.1 KU(A)/L
SHRIMP IGE QN: 1.77 KU(A)/L
SOYBEAN IGE QN: <0.1 KU(A)/L
TUNA IGE QN: <0.1 KU(A)/L
WALNUT IGE QN: <0.1 KU(A)/L
WHEAT IGE QN: 0.26 KU(A)/L

## 2023-07-01 DIAGNOSIS — Z91.018 MULTIPLE FOOD ALLERGIES: Primary | ICD-10-CM

## 2023-07-01 RX ORDER — EPINEPHRINE 0.3 MG/.3ML
0.3 INJECTION SUBCUTANEOUS PRN
Qty: 2 EACH | Refills: 1 | Status: SHIPPED | OUTPATIENT
Start: 2023-07-01

## 2023-07-03 ENCOUNTER — OFFICE VISIT (OUTPATIENT)
Dept: FAMILY MEDICINE | Facility: OTHER | Age: 23
End: 2023-07-03
Attending: FAMILY MEDICINE
Payer: COMMERCIAL

## 2023-07-03 VITALS
DIASTOLIC BLOOD PRESSURE: 80 MMHG | TEMPERATURE: 97.8 F | RESPIRATION RATE: 18 BRPM | BODY MASS INDEX: 36.26 KG/M2 | OXYGEN SATURATION: 96 % | HEART RATE: 85 BPM | SYSTOLIC BLOOD PRESSURE: 124 MMHG | HEIGHT: 64 IN | WEIGHT: 212.4 LBS

## 2023-07-03 DIAGNOSIS — G89.29 CHRONIC NONINTRACTABLE HEADACHE, UNSPECIFIED HEADACHE TYPE: Primary | ICD-10-CM

## 2023-07-03 DIAGNOSIS — R63.5 WEIGHT GAIN: ICD-10-CM

## 2023-07-03 DIAGNOSIS — F41.9 ANXIETY AND DEPRESSION: ICD-10-CM

## 2023-07-03 DIAGNOSIS — J32.1 CHRONIC FRONTAL SINUSITIS: ICD-10-CM

## 2023-07-03 DIAGNOSIS — R79.89 ELEVATED CORTISOL LEVEL: ICD-10-CM

## 2023-07-03 DIAGNOSIS — R51.9 CHRONIC NONINTRACTABLE HEADACHE, UNSPECIFIED HEADACHE TYPE: Primary | ICD-10-CM

## 2023-07-03 DIAGNOSIS — Z91.018 MULTIPLE FOOD ALLERGIES: ICD-10-CM

## 2023-07-03 DIAGNOSIS — F32.A ANXIETY AND DEPRESSION: ICD-10-CM

## 2023-07-03 DIAGNOSIS — I10 PRIMARY HYPERTENSION: ICD-10-CM

## 2023-07-03 PROCEDURE — 99214 OFFICE O/P EST MOD 30 MIN: CPT | Performed by: FAMILY MEDICINE

## 2023-07-03 RX ORDER — BUPROPION HYDROCHLORIDE 300 MG/1
300 TABLET ORAL EVERY MORNING
Qty: 90 TABLET | Refills: 3 | Status: SHIPPED | OUTPATIENT
Start: 2023-07-03 | End: 2023-09-07

## 2023-07-03 RX ORDER — SUMATRIPTAN 50 MG/1
50 TABLET, FILM COATED ORAL
Qty: 10 TABLET | Refills: 11 | Status: SHIPPED | OUTPATIENT
Start: 2023-07-03 | End: 2023-09-07

## 2023-07-03 ASSESSMENT — ANXIETY QUESTIONNAIRES
6. BECOMING EASILY ANNOYED OR IRRITABLE: NEARLY EVERY DAY
GAD7 TOTAL SCORE: 5
1. FEELING NERVOUS, ANXIOUS, OR ON EDGE: NOT AT ALL
7. FEELING AFRAID AS IF SOMETHING AWFUL MIGHT HAPPEN: NOT AT ALL
5. BEING SO RESTLESS THAT IT IS HARD TO SIT STILL: NOT AT ALL
GAD7 TOTAL SCORE: 5
4. TROUBLE RELAXING: NOT AT ALL
2. NOT BEING ABLE TO STOP OR CONTROL WORRYING: SEVERAL DAYS
3. WORRYING TOO MUCH ABOUT DIFFERENT THINGS: SEVERAL DAYS
IF YOU CHECKED OFF ANY PROBLEMS ON THIS QUESTIONNAIRE, HOW DIFFICULT HAVE THESE PROBLEMS MADE IT FOR YOU TO DO YOUR WORK, TAKE CARE OF THINGS AT HOME, OR GET ALONG WITH OTHER PEOPLE: SOMEWHAT DIFFICULT

## 2023-07-03 ASSESSMENT — PAIN SCALES - GENERAL: PAINLEVEL: SEVERE PAIN (6)

## 2023-07-10 ENCOUNTER — NURSE TRIAGE (OUTPATIENT)
Dept: FAMILY MEDICINE | Facility: OTHER | Age: 23
End: 2023-07-10
Payer: COMMERCIAL

## 2023-07-10 NOTE — TELEPHONE ENCOUNTER
"S-(situation): Patient has been experiencing episodes of tachycardia since yesterday afternoon.    B-(background): Taking lisinopril for hypertension    A-(assessment): Patient reports having episodes of tachycardia starting yesterday, experiencing chest discomfort, some difficulty breathing. Pulse was 135 and sustained for around 3 hours yesterday. Patient reports their heart rate increases when they are sitting down, but stays steady just standing up. Patient denies having any heart disease such as heart attack, heart surgery, heart failure. Patient states they take Lisinopril for their hypertension.     R-(recommendations): Per protocol patient should be seen in [facility], patient verbalizes understanding and agrees to go in.    Ne Nassar RN on 7/10/2023 at 9:48 AM    Reason for Disposition    Heart beating very rapidly (e.g., > 140 / minute) and not present now  (Exception: During exercise.)    Additional Information    Negative: Passed out (i.e., lost consciousness, collapsed and was not responding)    Negative: Shock suspected (e.g., cold/pale/clammy skin, too weak to stand, low BP, rapid pulse)    Negative: Difficult to awaken or acting confused (e.g., disoriented, slurred speech)    Negative: Visible sweat on face or sweat dripping down face    Negative: Unable to walk, or can only walk with assistance (e.g., requires support)    Negative: Received SHOCK from implantable cardiac defibrillator and has persisting symptoms (i.e., palpitations, lightheadedness)    Negative: Dizziness, lightheadedness, or weakness and heart beating very rapidly (e.g., > 140 / minute)    Negative: Dizziness, lightheadedness, or weakness and heart beating very slowly (e.g., < 50 / minute)    Negative: Sounds like a life-threatening emergency to the triager    Answer Assessment - Initial Assessment Questions  1. DESCRIPTION: \"Please describe your heart rate or heartbeat that you are having\" (e.g., fast/slow, " "regular/irregular, skipped or extra beats, \"palpitations\")      Beating fast, heart is skipping beats  2. ONSET: \"When did it start?\" (Minutes, hours or days)       Yesterday  3. DURATION: \"How long does it last\" (e.g., seconds, minutes, hours)      3 hours last night- took blood pressure medication pulse went down from 133 to 108  4. PATTERN \"Does it come and go, or has it been constant since it started?\"  \"Does it get worse with exertion?\"   \"Are you feeling it now?\"      Yes feeling this currently. Anything physical makes it worse  5. TAP: \"Using your hand, can you tap out what you are feeling on a chair or table in front of you, so that I can hear?\" (Note: not all patients can do this)        Unable  6. HEART RATE: \"Can you tell me your heart rate?\" \"How many beats in 15 seconds?\"  (Note: not all patients can do this)        122 @ 9:38 am today  7. RECURRENT SYMPTOM: \"Have you ever had this before?\" If Yes, ask: \"When was the last time?\" and \"What happened that time?\"       Yesterday first time it happened with tachycardia  8. CAUSE: \"What do you think is causing the palpitations?\"      Unsure  9. CARDIAC HISTORY: \"Do you have any history of heart disease?\" (e.g., heart attack, angina, bypass surgery, angioplasty, arrhythmia)       None- mom's side has heart disease  10. OTHER SYMPTOMS: \"Do you have any other symptoms?\" (e.g., dizziness, chest pain, sweating, difficulty breathing)        Chest pain, difficulty breathing  11. PREGNANCY: \"Is there any chance you are pregnant?\" \"When was your last menstrual period?\"        None    Protocols used: HEART RATE AND HEARTBEAT [id number]      "

## 2023-07-12 ENCOUNTER — OFFICE VISIT (OUTPATIENT)
Dept: FAMILY MEDICINE | Facility: OTHER | Age: 23
End: 2023-07-12
Attending: NURSE PRACTITIONER
Payer: COMMERCIAL

## 2023-07-12 VITALS
RESPIRATION RATE: 18 BRPM | DIASTOLIC BLOOD PRESSURE: 84 MMHG | TEMPERATURE: 98.5 F | WEIGHT: 206 LBS | HEIGHT: 66 IN | BODY MASS INDEX: 33.11 KG/M2 | SYSTOLIC BLOOD PRESSURE: 146 MMHG | HEART RATE: 90 BPM | OXYGEN SATURATION: 98 %

## 2023-07-12 DIAGNOSIS — I10 PRIMARY HYPERTENSION: Primary | ICD-10-CM

## 2023-07-12 DIAGNOSIS — R07.89 CHEST DISCOMFORT: ICD-10-CM

## 2023-07-12 LAB
ALBUMIN SERPL BCG-MCNC: 4.4 G/DL (ref 3.5–5.2)
ALP SERPL-CCNC: 111 U/L (ref 35–104)
ALT SERPL W P-5'-P-CCNC: 34 U/L (ref 0–50)
ANION GAP SERPL CALCULATED.3IONS-SCNC: 11 MMOL/L (ref 7–15)
AST SERPL W P-5'-P-CCNC: 21 U/L (ref 0–45)
BASOPHILS # BLD AUTO: 0 10E3/UL (ref 0–0.2)
BASOPHILS NFR BLD AUTO: 0 %
BILIRUB SERPL-MCNC: 0.4 MG/DL
BUN SERPL-MCNC: 5.8 MG/DL (ref 6–20)
CALCIUM SERPL-MCNC: 9.2 MG/DL (ref 8.6–10)
CHLORIDE SERPL-SCNC: 102 MMOL/L (ref 98–107)
CREAT SERPL-MCNC: 0.77 MG/DL (ref 0.51–0.95)
D DIMER PPP FEU-MCNC: <=0.27 UG/ML FEU (ref 0–0.5)
DEPRECATED HCO3 PLAS-SCNC: 24 MMOL/L (ref 22–29)
EOSINOPHIL # BLD AUTO: 0.1 10E3/UL (ref 0–0.7)
EOSINOPHIL NFR BLD AUTO: 1 %
ERYTHROCYTE [DISTWIDTH] IN BLOOD BY AUTOMATED COUNT: 12.7 % (ref 10–15)
GFR SERPL CREATININE-BSD FRML MDRD: >90 ML/MIN/1.73M2
GLUCOSE SERPL-MCNC: 82 MG/DL (ref 70–99)
HCT VFR BLD AUTO: 38.7 % (ref 35–47)
HGB BLD-MCNC: 13.7 G/DL (ref 11.7–15.7)
IMM GRANULOCYTES # BLD: 0 10E3/UL
IMM GRANULOCYTES NFR BLD: 0 %
LYMPHOCYTES # BLD AUTO: 0.9 10E3/UL (ref 0.8–5.3)
LYMPHOCYTES NFR BLD AUTO: 18 %
MCH RBC QN AUTO: 30.4 PG (ref 26.5–33)
MCHC RBC AUTO-ENTMCNC: 35.4 G/DL (ref 31.5–36.5)
MCV RBC AUTO: 86 FL (ref 78–100)
MONOCYTES # BLD AUTO: 0.4 10E3/UL (ref 0–1.3)
MONOCYTES NFR BLD AUTO: 8 %
NEUTROPHILS # BLD AUTO: 3.6 10E3/UL (ref 1.6–8.3)
NEUTROPHILS NFR BLD AUTO: 73 %
NRBC # BLD AUTO: 0 10E3/UL
NRBC BLD AUTO-RTO: 0 /100
PLATELET # BLD AUTO: 287 10E3/UL (ref 150–450)
POTASSIUM SERPL-SCNC: 4.3 MMOL/L (ref 3.4–5.3)
PROT SERPL-MCNC: 8.1 G/DL (ref 6.4–8.3)
RBC # BLD AUTO: 4.5 10E6/UL (ref 3.8–5.2)
SCANNED LAB RESULT: NORMAL
SCANNED LAB RESULT: NORMAL
SODIUM SERPL-SCNC: 137 MMOL/L (ref 136–145)
TROPONIN T SERPL HS-MCNC: <6 NG/L
WBC # BLD AUTO: 5 10E3/UL (ref 4–11)

## 2023-07-12 PROCEDURE — 84484 ASSAY OF TROPONIN QUANT: CPT | Mod: ZL

## 2023-07-12 PROCEDURE — 93000 ELECTROCARDIOGRAM COMPLETE: CPT | Performed by: INTERNAL MEDICINE

## 2023-07-12 PROCEDURE — 85025 COMPLETE CBC W/AUTO DIFF WBC: CPT | Mod: ZL

## 2023-07-12 PROCEDURE — 85379 FIBRIN DEGRADATION QUANT: CPT | Mod: ZL

## 2023-07-12 PROCEDURE — 80053 COMPREHEN METABOLIC PANEL: CPT | Mod: ZL

## 2023-07-12 PROCEDURE — 36415 COLL VENOUS BLD VENIPUNCTURE: CPT | Mod: ZL

## 2023-07-12 PROCEDURE — 99214 OFFICE O/P EST MOD 30 MIN: CPT

## 2023-07-12 ASSESSMENT — PAIN SCALES - GENERAL: PAINLEVEL: NO PAIN (0)

## 2023-07-12 NOTE — PROGRESS NOTES
ASSESSMENT/PLAN:    I have reviewed the nursing notes.  I have reviewed the findings, diagnosis, plan and need for follow up with the patient.    1. Primary hypertension  2. Chest discomfort  - EKG 12-lead, tracing only (Same Day)  - CBC and Differential  - Comprehensive Metabolic Panel  - Troponin T, High Sensitivity  - D dimer quantitative    Patient presents with concerns of elevated blood pressure and heart rate.  BP is elevated at 146/84 and pulse is 90 today in clinic.  EKG showed normal sinus rhythm and no other abnormalities.  Labs are stable.  Discussed results with patient in clinic.  Will have patient increase her lisinopril to 30 mg daily.  Advised patient to continue checking her blood pressure at home and keeping track of her results.  Advised patient to push fluids and reduce her salt intake.  Also advised patient to try and get at least 30 minutes of exercise daily.  Gilda with patient that her intermittent chest discomfort could be due to her stomach issues or elevated blood pressure, could also be due to stress and anxiety combination of these issues. Patient has a follow-up scheduled with her PCP within the next few weeks.  Vies her to bring her blood pressure readings with her to that clinic visit.    Discussed warning signs/symptoms indicative of need to f/u    Follow up if symptoms persist or worsen or concerns    I explained my diagnostic considerations and recommendations to the patient, who voiced understanding and agreement with the treatment plan. All questions were answered. We discussed potential side effects of any prescribed or recommended therapies, as well as expectations for response to treatments.    Andrés Schroeder, MILTON CNP  7/12/2023  11:41 AM    HPI:    Lyn Ny is a 22 year old female  who presents to [facility] today for concerns of high blood pressure and increased heart rate    Patient states that her BP and heart rate have been increased over the past 3 days.  States she has had some chest discomfort, mild dizziness, and mild shortness of breath. Her chest discomfort comes and goes and is in the middle of her chest and at times radiates to her shoulders. She denies heart palpitations but states that she can feel when her heart is racing. She does have a hx of depression and anxiety but she states this is well managed with medication and she does not believe this is related. Has a hx of migraines as well. Is currently on Lisinopril for HTN and checks her BP regularly at home. She takes her Lisinopril at night. She states that her BP over the last 3 days has been on average 130/96. She feels more fatigued the past few days. She denies alcohol use, tobacco use, and drug use. No caffeine intake.  She denies any vision changes.  She states that she has been drinking plenty of fluids.  She denies regular exercise and does not follow a low-salt diet.  She states that she has had to make adjustments in her diet recently due to finding out that she has gluten and dairy intolerances.    Past Medical History:   Diagnosis Date     Multiple food allergies 7/1/2023    Wheat (low), scallops (low), shrimp (moderate), cow's milk (moderate), egg white (moderate).     Spondylolysis of lumbar region     No Comments Provided     Past Surgical History:   Procedure Laterality Date     Lumbar fusion of L5  8/2/3017    subsequent infection     Social History     Tobacco Use     Smoking status: Never     Passive exposure: Never     Smokeless tobacco: Never     Tobacco comments:     Quit smoking: Mom smokes outside   Substance Use Topics     Alcohol use: No     Current Outpatient Medications   Medication Sig Dispense Refill     acetaminophen (TYLENOL) 325 MG tablet Take 650 mg by mouth       albuterol (PROAIR HFA/PROVENTIL HFA/VENTOLIN HFA) 108 (90 Base) MCG/ACT inhaler INHALE 2 PUFFS INTO THE LUNGS EVERY 4 HOURS AS NEEDED FOR SHORTNESS OF BREATH / DYSPNEA OR WHEEZING 18 g 11     budesonide  (PULMICORT) 0.5 MG/2ML neb solution Mix 240 ml Obi Med Sinus rinse, add 0.5 mg budesonide vial, rinse 1/2 bottle in each nostril twice daily 120 mL 4     buPROPion (WELLBUTRIN XL) 300 MG 24 hr tablet Take 1 tablet (300 mg) by mouth every morning 90 tablet 3     EPINEPHrine (ANY BX GENERIC EQUIV) 0.3 MG/0.3ML injection 2-pack Inject 0.3 mLs (0.3 mg) into the muscle as needed for anaphylaxis May repeat one time in 5-15 minutes if response to initial dose is inadequate. 2 each 1     fexofenadine (ALLEGRA) 180 MG tablet Take 1 tablet (180 mg) by mouth daily 90 tablet 1     lisinopril (ZESTRIL) 20 MG tablet Take 1 tablet (20 mg) by mouth daily 90 tablet 3     mometasone (NASONEX) 50 MCG/ACT nasal spray Spray 2 sprays into both nostrils daily --- Use after Neti Pot --- OTC okay 51 g 1     montelukast (SINGULAIR) 10 MG tablet Take 1 tablet (10 mg) by mouth At Bedtime 90 tablet 1     norgestrel-ethinyl estradiol (LO/OVRAL) 0.3-30 MG-MCG tablet Take 1 tablet by mouth daily 84 tablet 4     ondansetron (ZOFRAN ODT) 4 MG ODT tab Take 1 tablet (4 mg) by mouth every 8 hours as needed for nausea or vomiting 30 tablet 0     other medical supplies Omron blood pressure cuff & meter.  Diagnosis:  I10.  Length of need:  99. 1 each 0     prochlorperazine (COMPAZINE) 10 MG tablet Take 1 tablet (10 mg) by mouth every 6 hours as needed for nausea or vomiting 30 tablet 1     SUMAtriptan (IMITREX) 50 MG tablet Take 1 tablet (50 mg) by mouth at onset of headache for migraine May repeat in 2 hours. Max 4 tablets/24 hours. 10 tablet 11     vitamin B complex with vitamin C (VITAMIN  B COMPLEX) tablet Take 1 tablet by mouth daily -- make sure it has B12 in tablet 100 tablet 1     vitamin B-12 (CYANOCOBALAMIN) 2500 MCG sublingual tablet Take 1 tablet (2,500 mcg) by mouth daily 100 tablet 1     Allergies   Allergen Reactions     Azithromycin Other (See Comments)     Numbness in the right side, neurological changes.      Amoxicillin Nausea and  "Vomiting     Tolerates cephalexin     Cow's Milk [Lac Bovis]      Doxycycline Nausea and Vomiting     Egg White (Egg Protein)      Prozac [Fluoxetine] Dizziness and Nausea and Vomiting     Rifampin Nausea and Vomiting     Shrimp      Also allergic to scallops     Wheat Extract      Hydrocodone Nausea     Vomiting     Past medical history, past surgical history, current medications and allergies reviewed and accurate to the best of my knowledge.      ROS:  Refer to HPI    BP (!) 146/84 (BP Location: Left arm, Patient Position: Sitting, Cuff Size: Adult Regular)   Pulse 90   Temp 98.5  F (36.9  C) (Tympanic)   Resp 18   Ht 1.664 m (5' 5.5\")   Wt 93.4 kg (206 lb)   LMP 06/17/2023 (Approximate)   SpO2 98%   BMI 33.76 kg/m      EXAM:  General Appearance: Well appearing 22 year old female, appropriate appearance for age. No acute distress   Ears: Left TM intact, translucent with bony landmarks appreciated, no erythema, no effusion, no bulging, no purulence.  Right TM intact, translucent with bony landmarks appreciated, no erythema, no effusion, no bulging, no purulence.  Left auditory canal clear.  Right auditory canal clear.  Normal external ears, non tender.  Eyes: conjunctivae normal without erythema or irritation, corneas clear, no drainage or crusting, no eyelid swelling, pupils equal   Oropharynx: moist mucous membranes, posterior pharynx without erythema, tonsils symmetric and 1+, no erythema, no exudates or petechiae, no post nasal drip seen, no trismus, voice clear.    Nose:  Bilateral nares: no erythema, no edema, no drainage or congestion   Neck: supple without adenopathy  Respiratory: normal chest wall and respirations.  Normal effort.  Clear to auscultation bilaterally, no wheezing, crackles or rhonchi.  No increased work of breathing.  No cough appreciated.  Cardiac: RRR with no murmurs  Musculoskeletal:  Equal movement of bilateral upper extremities.  Equal movement of bilateral lower extremities.  " Normal gait.    Dermatological: no rashes noted of exposed skin  Neuro: Alert and oriented to person, place, and time.    Psychological: normal affect, alert, oriented, and pleasant.     Labs:  Results for orders placed or performed in visit on 07/12/23   Comprehensive Metabolic Panel     Status: Abnormal   Result Value Ref Range    Sodium 137 136 - 145 mmol/L    Potassium 4.3 3.4 - 5.3 mmol/L    Chloride 102 98 - 107 mmol/L    Carbon Dioxide (CO2) 24 22 - 29 mmol/L    Anion Gap 11 7 - 15 mmol/L    Urea Nitrogen 5.8 (L) 6.0 - 20.0 mg/dL    Creatinine 0.77 0.51 - 0.95 mg/dL    Calcium 9.2 8.6 - 10.0 mg/dL    Glucose 82 70 - 99 mg/dL    Alkaline Phosphatase 111 (H) 35 - 104 U/L    AST 21 0 - 45 U/L    ALT 34 0 - 50 U/L    Protein Total 8.1 6.4 - 8.3 g/dL    Albumin 4.4 3.5 - 5.2 g/dL    Bilirubin Total 0.4 <=1.2 mg/dL    GFR Estimate >90 >60 mL/min/1.73m2   Troponin T, High Sensitivity     Status: Normal   Result Value Ref Range    Troponin T, High Sensitivity <6 <=14 ng/L   D dimer quantitative     Status: Normal   Result Value Ref Range    D-Dimer Quantitative <=0.27 0.00 - 0.50 ug/mL FEU    Narrative    This D-dimer assay is intended for use in conjunction with a clinical pretest probability assessment model to exclude pulmonary embolism (PE) and deep venous thrombosis (DVT) in outpatients suspected of PE or DVT. The cut-off value is 0.50 ug/mL FEU.   CBC with platelets and differential     Status: None   Result Value Ref Range    WBC Count 5.0 4.0 - 11.0 10e3/uL    RBC Count 4.50 3.80 - 5.20 10e6/uL    Hemoglobin 13.7 11.7 - 15.7 g/dL    Hematocrit 38.7 35.0 - 47.0 %    MCV 86 78 - 100 fL    MCH 30.4 26.5 - 33.0 pg    MCHC 35.4 31.5 - 36.5 g/dL    RDW 12.7 10.0 - 15.0 %    Platelet Count 287 150 - 450 10e3/uL    % Neutrophils 73 %    % Lymphocytes 18 %    % Monocytes 8 %    % Eosinophils 1 %    % Basophils 0 %    % Immature Granulocytes 0 %    NRBCs per 100 WBC 0 <1 /100    Absolute Neutrophils 3.6 1.6 - 8.3  10e3/uL    Absolute Lymphocytes 0.9 0.8 - 5.3 10e3/uL    Absolute Monocytes 0.4 0.0 - 1.3 10e3/uL    Absolute Eosinophils 0.1 0.0 - 0.7 10e3/uL    Absolute Basophils 0.0 0.0 - 0.2 10e3/uL    Absolute Immature Granulocytes 0.0 <=0.4 10e3/uL    Absolute NRBCs 0.0 10e3/uL   EKG 12-lead, tracing only (Same Day)     Status: None (Preliminary result)   Result Value Ref Range    Systolic Blood Pressure  mmHg    Diastolic Blood Pressure  mmHg    Ventricular Rate 74 BPM    Atrial Rate 74 BPM    WV Interval 120 ms    QRS Duration 90 ms     ms    QTc 439 ms    P Axis 4 degrees    R AXIS 57 degrees    T Axis 10 degrees    Interpretation ECG       Sinus rhythm  Normal ECG  No previous ECGs available     CBC and Differential     Status: None    Narrative    The following orders were created for panel order CBC and Differential.  Procedure                               Abnormality         Status                     ---------                               -----------         ------                     CBC with platelets and d...[[id number]]                      Final result                 Please view results for these tests on the individual orders.

## 2023-07-12 NOTE — NURSING NOTE
"Patient presents to the clinic for high blood pressure. Patient states she has had a high pulse of up to 135 since 07/09/23. Patient states she has been throwing up bile almost every night the past 3 months. Patient states her chest feels very tight and she feels short of breath and lightheaded for the last two days constantly. Patient also states she has been seen in the clinic and ER almost every other week for the last 3 months. Patient states she woke up on 07/11/23 out of her sleep due to her heart racing and states she felt like her heart was pounding out of her chest. Patient states she is not in pain but would rate her chest discomfort at an 8 out of 10 currently. Patient also states she has been having discomfort in her abdomen for the past few days that spreads down to the lower right quadrant and up to the sternum. Patient states it feels like her intestines are twisted. States she has had trouble having bowel movements and instead of her body digesting the food she will throw it up instead.    FOOD SECURITY SCREENING QUESTIONS:    The next two questions are to help us understand your food security.  If you are feeling you need any assistance in this area, we have resources available to support you today.    Hunger Vital Signs:  Within the past 12 months we worried whether our food would run out before we got money to buy more. Never  Within the past 12 months the food we bought just didn't last and we didn't have money to get more. Never      Chief Complaint   Patient presents with     Hypertension       Initial BP (!) 146/84 (BP Location: Left arm, Patient Position: Sitting, Cuff Size: Adult Regular)   Pulse 90   Temp 98.5  F (36.9  C) (Tympanic)   Resp 18   Ht 1.664 m (5' 5.5\")   Wt 93.4 kg (206 lb)   LMP 06/17/2023 (Approximate)   SpO2 98%   BMI 33.76 kg/m   Estimated body mass index is 33.76 kg/m  as calculated from the following:    Height as of this encounter: 1.664 m (5' 5.5\").    Weight " as of this encounter: 93.4 kg (206 lb).  Medication Reconciliation: complete        Maria D Og LPN on 7/12/2023 at 11:21 AM

## 2023-07-12 NOTE — PATIENT INSTRUCTIONS
Increase Lisinopril to 30mg daily and keep track of BP readings  Follow- up with [facility] as scheduled

## 2023-07-13 LAB
ATRIAL RATE - MUSE: 74 BPM
DIASTOLIC BLOOD PRESSURE - MUSE: NORMAL MMHG
INTERPRETATION ECG - MUSE: NORMAL
P AXIS - MUSE: 4 DEGREES
PR INTERVAL - MUSE: 120 MS
QRS DURATION - MUSE: 90 MS
QT - MUSE: 396 MS
QTC - MUSE: 439 MS
R AXIS - MUSE: 57 DEGREES
SYSTOLIC BLOOD PRESSURE - MUSE: NORMAL MMHG
T AXIS - MUSE: 10 DEGREES
VENTRICULAR RATE- MUSE: 74 BPM

## 2023-07-14 ENCOUNTER — TELEPHONE (OUTPATIENT)
Dept: OTOLARYNGOLOGY | Facility: OTHER | Age: 23
End: 2023-07-14

## 2023-07-14 NOTE — TELEPHONE ENCOUNTER
----- Message from Nadine Caban NP sent at 7/14/2023  4:27 PM CDT -----  Mild positivity to cat and horse.  Mild to moderate positivity to dust.  Recommend continue budesonide rinses, follow-up with Dr. Hewitt for surgical consult.  Patient on the waiting list to be seen sooner.  Since she has cats in the house may start daily antihistamine.  No recommendation for allergy immunotherapy at this time.  Thanks JS

## 2023-07-14 NOTE — TELEPHONE ENCOUNTER
Called results to patient and discussed the plan for further follow up. Pt verbalized understanding and is agreeable to the plan.

## 2023-07-15 DIAGNOSIS — J32.4 CHRONIC PANSINUSITIS: ICD-10-CM

## 2023-07-18 ENCOUNTER — TELEPHONE (OUTPATIENT)
Dept: FAMILY MEDICINE | Facility: OTHER | Age: 23
End: 2023-07-18
Payer: COMMERCIAL

## 2023-07-18 RX ORDER — BUDESONIDE 0.5 MG/2ML
INHALANT ORAL
Qty: 120 ML | Refills: 4 | Status: SHIPPED | OUTPATIENT
Start: 2023-07-18 | End: 2023-08-01

## 2023-07-18 NOTE — TELEPHONE ENCOUNTER
Patients psychiatrist Dr. Ferrari  would like to speak to [facility] regarding patients psych history. Patient aware [facility] is out until 7/20/23.    Velma Becerra on 7/18/2023 at 3:25 PM

## 2023-07-19 ENCOUNTER — TELEPHONE (OUTPATIENT)
Dept: FAMILY MEDICINE | Facility: OTHER | Age: 23
End: 2023-07-19
Payer: COMMERCIAL

## 2023-07-19 NOTE — TELEPHONE ENCOUNTER
Doing a psychiatric disability review on patient to determine disability. Would like to discuss with [facility]. Please call.    Taniya Riojas on 7/19/2023 at 1:31 PM

## 2023-07-24 ENCOUNTER — HOSPITAL ENCOUNTER (EMERGENCY)
Facility: OTHER | Age: 23
Discharge: HOME | End: 2023-07-24
Attending: EMERGENCY MEDICINE | Admitting: EMERGENCY MEDICINE
Payer: COMMERCIAL

## 2023-07-24 ENCOUNTER — APPOINTMENT (OUTPATIENT)
Dept: CT IMAGING | Facility: OTHER | Age: 23
End: 2023-07-24
Attending: EMERGENCY MEDICINE
Payer: COMMERCIAL

## 2023-07-24 VITALS
RESPIRATION RATE: 16 BRPM | OXYGEN SATURATION: 97 % | DIASTOLIC BLOOD PRESSURE: 74 MMHG | TEMPERATURE: 96.9 F | HEART RATE: 91 BPM | SYSTOLIC BLOOD PRESSURE: 118 MMHG

## 2023-07-24 DIAGNOSIS — R11.2 NAUSEA AND VOMITING, UNSPECIFIED VOMITING TYPE: ICD-10-CM

## 2023-07-24 DIAGNOSIS — E87.6 HYPOKALEMIA: ICD-10-CM

## 2023-07-24 DIAGNOSIS — R10.9 ABDOMINAL PAIN, UNSPECIFIED ABDOMINAL LOCATION: ICD-10-CM

## 2023-07-24 LAB
ALBUMIN SERPL BCG-MCNC: 4.7 G/DL (ref 3.5–5.2)
ALBUMIN UR-MCNC: NEGATIVE MG/DL
ALP SERPL-CCNC: 106 U/L (ref 35–104)
ALT SERPL W P-5'-P-CCNC: 39 U/L (ref 0–50)
ANION GAP SERPL CALCULATED.3IONS-SCNC: 15 MMOL/L (ref 7–15)
APPEARANCE UR: CLEAR
AST SERPL W P-5'-P-CCNC: 26 U/L (ref 0–45)
BACTERIA #/AREA URNS HPF: ABNORMAL /HPF
BASOPHILS # BLD AUTO: 0 10E3/UL (ref 0–0.2)
BASOPHILS NFR BLD AUTO: 0 %
BILIRUB SERPL-MCNC: 0.6 MG/DL
BILIRUB UR QL STRIP: NEGATIVE
BUN SERPL-MCNC: 7.4 MG/DL (ref 6–20)
CALCIUM SERPL-MCNC: 10.2 MG/DL (ref 8.6–10)
CHLORIDE SERPL-SCNC: 99 MMOL/L (ref 98–107)
COLOR UR AUTO: ABNORMAL
CREAT SERPL-MCNC: 0.83 MG/DL (ref 0.51–0.95)
DEPRECATED HCO3 PLAS-SCNC: 23 MMOL/L (ref 22–29)
EOSINOPHIL # BLD AUTO: 0.1 10E3/UL (ref 0–0.7)
EOSINOPHIL NFR BLD AUTO: 1 %
ERYTHROCYTE [DISTWIDTH] IN BLOOD BY AUTOMATED COUNT: 12.4 % (ref 10–15)
GFR SERPL CREATININE-BSD FRML MDRD: >90 ML/MIN/1.73M2
GLUCOSE SERPL-MCNC: 126 MG/DL (ref 70–99)
GLUCOSE UR STRIP-MCNC: NEGATIVE MG/DL
HCG UR QL: NEGATIVE
HCT VFR BLD AUTO: 41.6 % (ref 35–47)
HGB BLD-MCNC: 14.7 G/DL (ref 11.7–15.7)
HGB UR QL STRIP: NEGATIVE
HOLD SPECIMEN: NORMAL
HYALINE CASTS: 1 /LPF
IMM GRANULOCYTES # BLD: 0 10E3/UL
IMM GRANULOCYTES NFR BLD: 0 %
KETONES UR STRIP-MCNC: NEGATIVE MG/DL
LACTATE SERPL-SCNC: 1.3 MMOL/L (ref 0.7–2)
LACTATE SERPL-SCNC: 4 MMOL/L (ref 0.7–2)
LEUKOCYTE ESTERASE UR QL STRIP: NEGATIVE
LIPASE SERPL-CCNC: 22 U/L (ref 13–60)
LYMPHOCYTES # BLD AUTO: 2.3 10E3/UL (ref 0.8–5.3)
LYMPHOCYTES NFR BLD AUTO: 41 %
MCH RBC QN AUTO: 29.9 PG (ref 26.5–33)
MCHC RBC AUTO-ENTMCNC: 35.3 G/DL (ref 31.5–36.5)
MCV RBC AUTO: 85 FL (ref 78–100)
MONOCYTES # BLD AUTO: 0.5 10E3/UL (ref 0–1.3)
MONOCYTES NFR BLD AUTO: 9 %
MUCOUS THREADS #/AREA URNS LPF: PRESENT /LPF
NEUTROPHILS # BLD AUTO: 2.7 10E3/UL (ref 1.6–8.3)
NEUTROPHILS NFR BLD AUTO: 49 %
NITRATE UR QL: NEGATIVE
NRBC # BLD AUTO: 0 10E3/UL
NRBC BLD AUTO-RTO: 0 /100
PH UR STRIP: 7 [PH] (ref 5–9)
PLATELET # BLD AUTO: 373 10E3/UL (ref 150–450)
POTASSIUM SERPL-SCNC: 3.2 MMOL/L (ref 3.4–5.3)
PROT SERPL-MCNC: 8.2 G/DL (ref 6.4–8.3)
RBC # BLD AUTO: 4.91 10E6/UL (ref 3.8–5.2)
RBC URINE: 1 /HPF
SODIUM SERPL-SCNC: 137 MMOL/L (ref 136–145)
SP GR UR STRIP: 1.01 (ref 1–1.03)
SQUAMOUS EPITHELIAL: 8 /HPF
UROBILINOGEN UR STRIP-MCNC: NORMAL MG/DL
WBC # BLD AUTO: 5.5 10E3/UL (ref 4–11)
WBC URINE: 4 /HPF

## 2023-07-24 PROCEDURE — 96375 TX/PRO/DX INJ NEW DRUG ADDON: CPT

## 2023-07-24 PROCEDURE — 83690 ASSAY OF LIPASE: CPT | Performed by: EMERGENCY MEDICINE

## 2023-07-24 PROCEDURE — 74177 CT ABD & PELVIS W/CONTRAST: CPT | Mod: TC

## 2023-07-24 PROCEDURE — 80053 COMPREHEN METABOLIC PANEL: CPT | Performed by: EMERGENCY MEDICINE

## 2023-07-24 PROCEDURE — 99284 EMERGENCY DEPT VISIT MOD MDM: CPT | Performed by: EMERGENCY MEDICINE

## 2023-07-24 PROCEDURE — 250N000011 HC RX IP 250 OP 636: Mod: JZ | Performed by: EMERGENCY MEDICINE

## 2023-07-24 PROCEDURE — 81001 URINALYSIS AUTO W/SCOPE: CPT | Performed by: EMERGENCY MEDICINE

## 2023-07-24 PROCEDURE — 83605 ASSAY OF LACTIC ACID: CPT | Mod: 91 | Performed by: EMERGENCY MEDICINE

## 2023-07-24 PROCEDURE — 96374 THER/PROPH/DIAG INJ IV PUSH: CPT | Mod: XU

## 2023-07-24 PROCEDURE — 99285 EMERGENCY DEPT VISIT HI MDM: CPT | Mod: 25

## 2023-07-24 PROCEDURE — 85025 COMPLETE CBC W/AUTO DIFF WBC: CPT | Performed by: EMERGENCY MEDICINE

## 2023-07-24 PROCEDURE — 250N000011 HC RX IP 250 OP 636: Performed by: EMERGENCY MEDICINE

## 2023-07-24 PROCEDURE — 258N000003 HC RX IP 258 OP 636: Performed by: EMERGENCY MEDICINE

## 2023-07-24 PROCEDURE — 96361 HYDRATE IV INFUSION ADD-ON: CPT

## 2023-07-24 PROCEDURE — 36415 COLL VENOUS BLD VENIPUNCTURE: CPT | Performed by: EMERGENCY MEDICINE

## 2023-07-24 PROCEDURE — 250N000013 HC RX MED GY IP 250 OP 250 PS 637: Performed by: EMERGENCY MEDICINE

## 2023-07-24 PROCEDURE — 81025 URINE PREGNANCY TEST: CPT | Performed by: EMERGENCY MEDICINE

## 2023-07-24 RX ORDER — ONDANSETRON 2 MG/ML
4 INJECTION INTRAMUSCULAR; INTRAVENOUS ONCE
Status: COMPLETED | OUTPATIENT
Start: 2023-07-24 | End: 2023-07-24

## 2023-07-24 RX ORDER — IOPAMIDOL 755 MG/ML
118 INJECTION, SOLUTION INTRAVASCULAR ONCE
Status: COMPLETED | OUTPATIENT
Start: 2023-07-24 | End: 2023-07-24

## 2023-07-24 RX ORDER — POTASSIUM CHLORIDE 1500 MG/1
20 TABLET, EXTENDED RELEASE ORAL ONCE
Status: COMPLETED | OUTPATIENT
Start: 2023-07-24 | End: 2023-07-24

## 2023-07-24 RX ORDER — METOCLOPRAMIDE HYDROCHLORIDE 5 MG/ML
10 INJECTION INTRAMUSCULAR; INTRAVENOUS ONCE
Status: COMPLETED | OUTPATIENT
Start: 2023-07-24 | End: 2023-07-24

## 2023-07-24 RX ORDER — METOCLOPRAMIDE 10 MG/1
10 TABLET ORAL 3 TIMES DAILY PRN
Qty: 9 TABLET | Refills: 0 | Status: SHIPPED | OUTPATIENT
Start: 2023-07-24 | End: 2024-07-01

## 2023-07-24 RX ADMIN — SODIUM CHLORIDE 1000 ML: 9 INJECTION, SOLUTION INTRAVENOUS at 03:42

## 2023-07-24 RX ADMIN — SODIUM CHLORIDE 1000 ML: 9 INJECTION, SOLUTION INTRAVENOUS at 03:05

## 2023-07-24 RX ADMIN — METOCLOPRAMIDE 10 MG: 5 INJECTION, SOLUTION INTRAMUSCULAR; INTRAVENOUS at 03:18

## 2023-07-24 RX ADMIN — POTASSIUM CHLORIDE 20 MEQ: 1500 TABLET, EXTENDED RELEASE ORAL at 05:09

## 2023-07-24 RX ADMIN — IOPAMIDOL 118 ML: 755 INJECTION, SOLUTION INTRAVENOUS at 06:06

## 2023-07-24 RX ADMIN — ONDANSETRON 4 MG: 2 INJECTION INTRAMUSCULAR; INTRAVENOUS at 02:54

## 2023-07-24 ASSESSMENT — ENCOUNTER SYMPTOMS
EYES NEGATIVE: 1
NECK PAIN: 0
ALLERGIC/IMMUNOLOGIC NEGATIVE: 1
NECK STIFFNESS: 0
MUSCULOSKELETAL NEGATIVE: 1
NAUSEA: 1
PSYCHIATRIC NEGATIVE: 1
HEMATOLOGIC/LYMPHATIC NEGATIVE: 1
MYALGIAS: 0
VOMITING: 1
NEUROLOGICAL NEGATIVE: 1
ABDOMINAL PAIN: 1
CARDIOVASCULAR NEGATIVE: 1
CONSTITUTIONAL NEGATIVE: 1
ENDOCRINE NEGATIVE: 1
RESPIRATORY NEGATIVE: 1

## 2023-07-24 ASSESSMENT — ACTIVITIES OF DAILY LIVING (ADL)
ADLS_ACUITY_SCORE: 35
ADLS_ACUITY_SCORE: 35

## 2023-07-24 NOTE — ED PROVIDER NOTES
History     Chief Complaint   Patient presents with     Abdominal Pain     Nausea, Vomiting, & Diarrhea     HPI  Lyn Ny is a 22 year old female who presents today with complaints of abdominal pain nausea vomiting and diarrhea.  Symptoms began yesterday with nausea and vomiting but patient is actually had nausea and vomiting for several months.  States that no one's been able to provide a diagnosis.  Patient denies any fevers.  Denies any ill contacts or recent travel outside the country.    Allergies:  Allergies   Allergen Reactions     Azithromycin Other (See Comments)     Numbness in the right side, neurological changes.      Amoxicillin Nausea and Vomiting     Tolerates cephalexin     Cow's Milk [Milk (Cow)]      Doxycycline Nausea and Vomiting     Egg White (Egg Protein)      Prozac [Fluoxetine] Dizziness and Nausea and Vomiting     Rifampin Nausea and Vomiting     Shrimp      Also allergic to scallops     Wheat Extract      Hydrocodone Nausea     Vomiting       Problem List:    Patient Active Problem List    Diagnosis Date Noted     Multiple food allergies 07/01/2023     Priority: Medium     Wheat (low), scallops (low), shrimp (moderate), cow's milk (moderate), egg white (moderate).       Migraine without aura and without status migrainosus, not intractable 06/22/2023     Priority: Medium     Congestion of paranasal sinus 06/22/2023     Priority: Medium     Mild persistent asthma without complication 10/24/2022     Priority: Medium     Acne 02/10/2018     Priority: Medium     Menorrhagia with irregular cycle 02/10/2018     Priority: Medium     Spondylolysis of lumbar region 07/17/2017     Priority: Medium        Past Medical History:    Past Medical History:   Diagnosis Date     Multiple food allergies 7/1/2023     Spondylolysis of lumbar region        Past Surgical History:    Past Surgical History:   Procedure Laterality Date     Lumbar fusion of L5  8/2/3017    subsequent infection       Family  History:    Family History   Problem Relation Age of Onset     Hypertension Mother         Hypertension,Borderline     Hypertension Father         Hypertension     Hyperlipidemia Father         Hyperlipidemia     Hypertension Maternal Grandmother         Hypertension     Hypertension Maternal Grandfather         Hypertension     Hypertension Paternal Grandfather         Hypertension     Heart Disease Paternal Grandfather         Heart Disease,smoker       Social History:  Marital Status:  Single [1]  Social History     Tobacco Use     Smoking status: Never     Passive exposure: Never     Smokeless tobacco: Never     Tobacco comments:     Quit smoking: Mom smokes outside   Vaping Use     Vaping Use: Never used   Substance Use Topics     Alcohol use: No     Drug use: Never     Comment: Drug use: No        Medications:    ondansetron (ZOFRAN ODT) 4 MG ODT tab  prochlorperazine (COMPAZINE) 10 MG tablet  acetaminophen (TYLENOL) 325 MG tablet  albuterol (PROAIR HFA/PROVENTIL HFA/VENTOLIN HFA) 108 (90 Base) MCG/ACT inhaler  budesonide (PULMICORT) 0.5 MG/2ML neb solution  buPROPion (WELLBUTRIN XL) 300 MG 24 hr tablet  EPINEPHrine (ANY BX GENERIC EQUIV) 0.3 MG/0.3ML injection 2-pack  fexofenadine (ALLEGRA) 180 MG tablet  lisinopril (ZESTRIL) 20 MG tablet  mometasone (NASONEX) 50 MCG/ACT nasal spray  montelukast (SINGULAIR) 10 MG tablet  norgestrel-ethinyl estradiol (LO/OVRAL) 0.3-30 MG-MCG tablet  other medical supplies  SUMAtriptan (IMITREX) 50 MG tablet  vitamin B complex with vitamin C (VITAMIN  B COMPLEX) tablet  vitamin B-12 (CYANOCOBALAMIN) 2500 MCG sublingual tablet          Review of Systems   Constitutional: Negative.    HENT: Negative.    Eyes: Negative.    Respiratory: Negative.    Cardiovascular: Negative.    Gastrointestinal: Positive for abdominal pain, nausea and vomiting.   Endocrine: Negative.    Genitourinary: Negative.    Musculoskeletal: Negative.  Negative for myalgias, neck pain and neck stiffness.    Skin: Negative.    Allergic/Immunologic: Negative.    Neurological: Negative.    Hematological: Negative.    Psychiatric/Behavioral: Negative.        Physical Exam   BP: 136/78  Pulse: 96  Temp: 96.9  F (36.1  C)  Resp: 16  SpO2: 99 %      Physical Exam  Constitutional:       General: She is not in acute distress.     Appearance: She is well-developed and normal weight. She is not toxic-appearing.   HENT:      Head: Normocephalic and atraumatic.   Eyes:      Extraocular Movements: Extraocular movements intact.   Cardiovascular:      Rate and Rhythm: Normal rate and regular rhythm.   Pulmonary:      Effort: Pulmonary effort is normal. No respiratory distress.      Breath sounds: Normal breath sounds. No wheezing.   Abdominal:      General: Abdomen is flat. There is no distension or abdominal bruit.      Palpations: Abdomen is soft.      Tenderness: There is no abdominal tenderness.   Skin:     Capillary Refill: Capillary refill takes less than 2 seconds.   Neurological:      General: No focal deficit present.      Mental Status: She is alert.         ED Course                 Procedures         Results for orders placed or performed during the hospital encounter of 07/24/23 (from the past 24 hour(s))   Extra Tube (Purdy Draw)    Narrative    The following orders were created for panel order Extra Tube (Purdy Draw).  Procedure                               Abnormality         Status                     ---------                               -----------         ------                     Extra Blue Top Tube[[id number]]                              Final result               Extra Red Top Tube[[id number]]                               Final result               Extra Green Top (Lithium...[[id number]]                      Final result               Extra Purple Top Tube[[id number]]                            Final result               Extra Green Top (Lithium...[[id number]]                      Final result                  Please view results for these tests on the individual orders.   Extra Blue Top Tube   Result Value Ref Range    Hold Specimen JIC    Extra Red Top Tube   Result Value Ref Range    Hold Specimen JIC    Extra Green Top (Lithium Heparin) Tube   Result Value Ref Range    Hold Specimen JIC    Extra Purple Top Tube   Result Value Ref Range    Hold Specimen JIC    Extra Green Top (Lithium Heparin) ON ICE   Result Value Ref Range    Hold Specimen JIC    CBC with platelets differential    Narrative    The following orders were created for panel order CBC with platelets differential.  Procedure                               Abnormality         Status                     ---------                               -----------         ------                     CBC with platelets and d...[[id number]]                      Final result                 Please view results for these tests on the individual orders.   Comprehensive metabolic panel   Result Value Ref Range    Sodium 137 136 - 145 mmol/L    Potassium 3.2 (L) 3.4 - 5.3 mmol/L    Chloride 99 98 - 107 mmol/L    Carbon Dioxide (CO2) 23 22 - 29 mmol/L    Anion Gap 15 7 - 15 mmol/L    Urea Nitrogen 7.4 6.0 - 20.0 mg/dL    Creatinine 0.83 0.51 - 0.95 mg/dL    Calcium 10.2 (H) 8.6 - 10.0 mg/dL    Glucose 126 (H) 70 - 99 mg/dL    Alkaline Phosphatase 106 (H) 35 - 104 U/L    AST 26 0 - 45 U/L    ALT 39 0 - 50 U/L    Protein Total 8.2 6.4 - 8.3 g/dL    Albumin 4.7 3.5 - 5.2 g/dL    Bilirubin Total 0.6 <=1.2 mg/dL    GFR Estimate >90 >60 mL/min/1.73m2   Lipase   Result Value Ref Range    Lipase 22 13 - 60 U/L   Lactic acid whole blood   Result Value Ref Range    Lactic Acid 4.0 (HH) 0.7 - 2.0 mmol/L   CBC with platelets and differential   Result Value Ref Range    WBC Count 5.5 4.0 - 11.0 10e3/uL    RBC Count 4.91 3.80 - 5.20 10e6/uL    Hemoglobin 14.7 11.7 - 15.7 g/dL    Hematocrit 41.6 35.0 - 47.0 %    MCV 85 78 - 100 fL    MCH 29.9 26.5 - 33.0 pg    MCHC 35.3 31.5 - 36.5 g/dL     RDW 12.4 10.0 - 15.0 %    Platelet Count 373 150 - 450 10e3/uL    % Neutrophils 49 %    % Lymphocytes 41 %    % Monocytes 9 %    % Eosinophils 1 %    % Basophils 0 %    % Immature Granulocytes 0 %    NRBCs per 100 WBC 0 <1 /100    Absolute Neutrophils 2.7 1.6 - 8.3 10e3/uL    Absolute Lymphocytes 2.3 0.8 - 5.3 10e3/uL    Absolute Monocytes 0.5 0.0 - 1.3 10e3/uL    Absolute Eosinophils 0.1 0.0 - 0.7 10e3/uL    Absolute Basophils 0.0 0.0 - 0.2 10e3/uL    Absolute Immature Granulocytes 0.0 <=0.4 10e3/uL    Absolute NRBCs 0.0 10e3/uL   HCG qualitative urine   Result Value Ref Range    hCG Urine Qualitative Negative Negative   UA with Microscopic reflex to Culture    Specimen: Urine, Clean Catch   Result Value Ref Range    Color Urine Light Yellow Colorless, Straw, Light Yellow, Yellow    Appearance Urine Clear Clear    Glucose Urine Negative Negative mg/dL    Bilirubin Urine Negative Negative    Ketones Urine Negative Negative mg/dL    Specific Gravity Urine 1.009 1.000 - 1.030    Blood Urine Negative Negative    pH Urine 7.0 5.0 - 9.0    Protein Albumin Urine Negative Negative mg/dL    Urobilinogen Urine Normal Normal, 2.0 mg/dL    Nitrite Urine Negative Negative    Leukocyte Esterase Urine Negative Negative    Bacteria Urine Few (A) None Seen /HPF    Mucus Urine Present (A) None Seen /LPF    RBC Urine 1 <=2 /HPF    WBC Urine 4 <=5 /HPF    Squamous Epithelials Urine 8 (H) <=1 /HPF    Hyaline Casts Urine 1 <=2 /LPF    Narrative    Urine Culture not indicated   Lactic acid whole blood   Result Value Ref Range    Lactic Acid 1.3 0.7 - 2.0 mmol/L   CT Abdomen Pelvis w Contrast    Narrative    PROCEDURE INFORMATION:   Exam: CT Abdomen And Pelvis With Contrast   Exam date and time: 7/24/2023 5:59 AM   Age: 22 years old   Clinical indication: Nausea and vomiting; Abdominal pain; Acute; Prior surgery;   Surgery date: 6+ months; Surgery type: Lumbar fusion; Additional info:   22-year-old with complaints of abdominal pain and  nausea and vomiting. Rule out   bowel obstruction     TECHNIQUE:   Imaging protocol: Computed tomography of the abdomen and pelvis with contrast.   Radiation optimization: All CT scans at this facility use at least one of these   dose optimization techniques: automated exposure control; mA and/or kV   adjustment per patient size (includes targeted exams where dose is matched to   clinical indication); or iterative reconstruction.   Contrast material: ISOVUE 370; Contrast volume: 118 ml; Contrast route:   INTRAVENOUS (IV);      REPORTING DATA:   Count of CT and Cardiac NM exams in prior 12 months: This patient has received   0 known CTs and 0 known cardiac nuclear medicine studies in the 12 months prior   to the current study.     COMPARISON:   MR LUMBAR SPINE W/O W CONTRAST 8/31/2021 3:35 PM     FINDINGS:   Liver: Normal. No mass.   Gallbladder and bile ducts: Normal. No calcified stones. No ductal dilation.   Pancreas: Normal. No ductal dilation.   Spleen: Normal. No splenomegaly.   Adrenal glands: Normal. No mass.   Kidneys and ureters: Normal. No hydronephrosis.   Stomach and bowel: Unremarkable. No obstruction. No mucosal thickening.   Appendix: No evidence of appendicitis.     Intraperitoneal space: Unremarkable. No free air. No significant fluid   collection.   Vasculature: Unremarkable. No abdominal aortic aneurysm.   Lymph nodes: Unremarkable. No enlarged lymph nodes.   Urinary bladder: Unremarkable as visualized.   Reproductive: Unremarkable as visualized.   Bones/joints: Unremarkable. No acute fracture. L5-S1 posterior fusion hardware   positioning is unremarkable. No complications seen.   Soft tissues: Unremarkable.       Impression    IMPRESSION:   No acute findings.     THIS DOCUMENT HAS BEEN ELECTRONICALLY SIGNED BY JUSTIN FLANAGAN MD       Medications   0.9% sodium chloride BOLUS (has no administration in time range)   ondansetron (ZOFRAN) injection 4 mg (4 mg Intravenous $Given 7/24/23 0254)        Assessments & Plan (with Medical Decision Making)     22-year-old female who presents today with complaints of nausea, vomiting and abdominal pain.  Symptoms present for many months.  No etiology given.  Patient complains of abdominal pain as a result of vomiting.     Treated symptomatically here with IV fluids and had labs drawn as above.  Notable findings include a slightly low potassium as well as elevated lactate.  Potassium replaced orally.  Lactate level rechecked after IV fluid hydration and much proved.  Patient feeling better at time of discharge but due to consistent complaints of abdominal pain.  Abdominal CT was done.  CT showed no acute abnormalities.      At this time, patient feeling better.  Patient is going to be discharged home andis going to be given a prescription for Reglan to be to take 3 times a day as needed nausea.  Patient understands to return if she develops any new or worsening symptoms.    Due to the nature of this electronic medical record, laboratory results, imaging results, diagnosis, other information and medications reported above may not represent information available to me at the the time of my care and disposition. Medications reported above may have not been ordered by me.     Portions of the record may have been created with voice recognition software. Occasional wrong-word or 'sound-a- like' substitution may have occurred due to the inherent limitations of voice recognition software. Though the chart has been reviewed, there may be inadvertent transcription errors. Read the chart carefully and recognize, using context, where substitutions have occurred.         New Prescriptions    No medications on file       Final diagnoses:   Abdominal pain, unspecified abdominal location   Nausea and vomiting, unspecified vomiting type   Hypokalemia       7/24/2023   [facility] AND [facility]     Suri Montes De Oca MD  07/24/23 0198

## 2023-07-24 NOTE — PROGRESS NOTES
Pt reports she injected herself with an epi pen prior to arrival to the ER. She stated she was SOB. Also states she was dry heaving at this time.

## 2023-07-24 NOTE — ED TRIAGE NOTES
Patient presents to ER with complaint of nausea vomiting and abdominal pain 6/10 for the past 2 days. Does not know when last bowel movement was. No urinary symptoms. /78   Pulse 96   Temp 96.9  F (36.1  C) (Tympanic)   Resp 16   LMP 06/17/2023 (Approximate)   SpO2 99%        Triage Assessment     Row Name 07/24/23 0233       Triage Assessment (Adult)    Airway WDL WDL       Respiratory WDL    Respiratory WDL WDL       Skin Circulation/Temperature WDL    Skin Circulation/Temperature WDL WDL       Cardiac WDL    Cardiac WDL WDL       Peripheral/Neurovascular WDL    Peripheral Neurovascular WDL WDL       Cognitive/Neuro/Behavioral WDL    Cognitive/Neuro/Behavioral WDL WDL

## 2023-07-24 NOTE — DISCHARGE INSTRUCTIONS
1) Follow the aftercare instructions provided.   2) You have been given a medication or prescription for a medication that can make you drowsy. Do not drink, drive, ride a bicycle or operate any heavy machinery while using this medication.   3) You have been given a prescription for a medication that can cause an allergic reactions. Return to the ER if you develop any itching, tongue swelling, wheezing or shortness of breath.  4) You must follow up with your doctor in 12 to 24 hours or return to the ER for a recheck.   5) You must recheck your potassium levels in 48 hours.  Go to the lab to have it drawn and follow-up with your regular doctor to check the results.

## 2023-07-24 NOTE — ED NOTES
"Ask patient about Zofran and compazine RX. Patient states \"I stopped taking the zofran when I got the compazine because it didn't work. Then I got the compazine, but I stopped that because it didn't work either.\" Md notified.   "

## 2023-07-25 NOTE — TELEPHONE ENCOUNTER
So has an appoint for endocrinology but its a month away.  She's struggling, and has been in the ER a couple of times.  Is there somewhere else she can get in quicker?  Please call    Sarah Mustafa on 7/25/2023 at 12:18 PM

## 2023-07-25 NOTE — TELEPHONE ENCOUNTER
I have one same day appointment left on Friday, which she can have if she thinks that she can wait until then.  If not, would recommend that she go to the Emergency Department again if her symptoms are severe.  Narcisa Steve MD

## 2023-07-25 NOTE — TELEPHONE ENCOUNTER
Not eating, has been vomiting for last few days.  Unable to sleep.  Awoke last night during the night vomiting violently.  Has been choking on her vomit.  Presented to the ED 2 days ago in the middle of the night. Ct scan didn't show anything.  She states that she needs to be seen soon by someone.  Lis Garcia LPN..........7/25/2023  1:10 PM

## 2023-07-27 ASSESSMENT — ANXIETY QUESTIONNAIRES
7. FEELING AFRAID AS IF SOMETHING AWFUL MIGHT HAPPEN: SEVERAL DAYS
GAD7 TOTAL SCORE: 12
2. NOT BEING ABLE TO STOP OR CONTROL WORRYING: MORE THAN HALF THE DAYS
4. TROUBLE RELAXING: SEVERAL DAYS
5. BEING SO RESTLESS THAT IT IS HARD TO SIT STILL: SEVERAL DAYS
1. FEELING NERVOUS, ANXIOUS, OR ON EDGE: MORE THAN HALF THE DAYS
6. BECOMING EASILY ANNOYED OR IRRITABLE: NEARLY EVERY DAY
IF YOU CHECKED OFF ANY PROBLEMS ON THIS QUESTIONNAIRE, HOW DIFFICULT HAVE THESE PROBLEMS MADE IT FOR YOU TO DO YOUR WORK, TAKE CARE OF THINGS AT HOME, OR GET ALONG WITH OTHER PEOPLE: VERY DIFFICULT
GAD7 TOTAL SCORE: 12
3. WORRYING TOO MUCH ABOUT DIFFERENT THINGS: MORE THAN HALF THE DAYS

## 2023-07-27 ASSESSMENT — PATIENT HEALTH QUESTIONNAIRE - PHQ9
SUM OF ALL RESPONSES TO PHQ QUESTIONS 1-9: 19
SUM OF ALL RESPONSES TO PHQ QUESTIONS 1-9: 19
10. IF YOU CHECKED OFF ANY PROBLEMS, HOW DIFFICULT HAVE THESE PROBLEMS MADE IT FOR YOU TO DO YOUR WORK, TAKE CARE OF THINGS AT HOME, OR GET ALONG WITH OTHER PEOPLE: VERY DIFFICULT

## 2023-07-28 ENCOUNTER — OFFICE VISIT (OUTPATIENT)
Dept: FAMILY MEDICINE | Facility: OTHER | Age: 23
End: 2023-07-28
Attending: FAMILY MEDICINE
Payer: COMMERCIAL

## 2023-07-28 VITALS — TEMPERATURE: 98.5 F | HEART RATE: 100 BPM | WEIGHT: 202.4 LBS | BODY MASS INDEX: 33.17 KG/M2 | RESPIRATION RATE: 16 BRPM

## 2023-07-28 DIAGNOSIS — R51.9 CHRONIC NONINTRACTABLE HEADACHE, UNSPECIFIED HEADACHE TYPE: ICD-10-CM

## 2023-07-28 DIAGNOSIS — F32.A ANXIETY AND DEPRESSION: ICD-10-CM

## 2023-07-28 DIAGNOSIS — Z91.018 MULTIPLE FOOD ALLERGIES: ICD-10-CM

## 2023-07-28 DIAGNOSIS — G89.29 CHRONIC NONINTRACTABLE HEADACHE, UNSPECIFIED HEADACHE TYPE: ICD-10-CM

## 2023-07-28 DIAGNOSIS — E87.6 HYPOKALEMIA: ICD-10-CM

## 2023-07-28 DIAGNOSIS — F41.9 ANXIETY AND DEPRESSION: ICD-10-CM

## 2023-07-28 DIAGNOSIS — J32.1 CHRONIC FRONTAL SINUSITIS: ICD-10-CM

## 2023-07-28 DIAGNOSIS — I10 ESSENTIAL HYPERTENSION, BENIGN: ICD-10-CM

## 2023-07-28 DIAGNOSIS — R11.2 NAUSEA AND VOMITING, UNSPECIFIED VOMITING TYPE: Primary | ICD-10-CM

## 2023-07-28 DIAGNOSIS — R79.89 ELEVATED CORTISOL LEVEL: ICD-10-CM

## 2023-07-28 LAB — POTASSIUM SERPL-SCNC: 4.2 MMOL/L (ref 3.4–5.3)

## 2023-07-28 PROCEDURE — 36415 COLL VENOUS BLD VENIPUNCTURE: CPT | Mod: ZL | Performed by: FAMILY MEDICINE

## 2023-07-28 PROCEDURE — 84132 ASSAY OF SERUM POTASSIUM: CPT | Mod: ZL | Performed by: FAMILY MEDICINE

## 2023-07-28 PROCEDURE — 99214 OFFICE O/P EST MOD 30 MIN: CPT | Performed by: FAMILY MEDICINE

## 2023-07-28 ASSESSMENT — PAIN SCALES - GENERAL: PAINLEVEL: SEVERE PAIN (7)

## 2023-07-28 NOTE — PROGRESS NOTES
Nursing Notes:   Fabianajaney Ronel JAVIER, LPN  7/28/2023  1:25 PM  Sign at exiting of workspace  Chief Complaint   Patient presents with    RECHECK         Medication Reconciliation: brittney JAVIERTank Conner MARITZATORREY Nicholson is a 22 year old, presenting for the following health issues:  RECHECK        7/28/2023     1:24 PM   Additional Questions   Roomed by Ronel Nicholson is here for follow up today.  She had been to the Emergency Department on 7/24/23.  She was there due to complaint of abdominal pain, nausea/vomiting.  She started with symptoms the day prior, but has had nausea/vomiting for several months.  CT of the abdomen and pelvis did not show any actue findings.  Comprehensive Metabolic Profile showed potassium of 3.2.  glucose was 126 (non-fasting), calcium 10.2, alkaline phosphatase 106.  Lipase normal at 22.  Lactic acid elevated at 4.0.  Complete Blood Count normal.  Urine HCG negative, urinalysis negative, lactic acid repeat was normal at 1.3.  in the Emergency Department, she was given IV fluids.  She was given oral potassium chloride.  She was discharged home with a prescription for Reglan for nausea.  She does have an underlying history of migraine.  She had woke at 1 am and was very nauseous, vomiting until about 2 am.  She didn't feel like she was having acid reflux.  Hadn't eaten anything anything unusual the day before.  She has chronic headaches and had a slight one the night the night before.  Doesn't use THC.  Her appetite has been less lately.  Hasn't eaten gluten or dairy for a couple of weeks.  Has tried zofran, compazine and reglan for nausea and none of them are helping.  Wakes up in the middle of the night often with nausea.  She does have chronic sinus issuses as well and has an ENT consult scheduled about sinus surgery.    Her anxiety and depression have been worse lately.  The past couple of weeks, her anxiety has been a lot worse. Her leave from work just was  approved a couple of weeks ago.      She has been unable to work over the last several months due to a constellation of multiple symptoms.  She has had issues with chronic headaches.  She was found to have some chronic frontal sinusitis.  She does have an upcoming appointment with ENT as noted above and likely will be having sinus surgery.  She also has had testing and found that she has multiple food allergies.  She has been referred to an allergist as well and is waiting to hear about that appointment.    She also had an elevated cortisol level and had been referred to endocrinology for further evaluation.  She had this testing in the setting of hypertension and excessive weight gain as well.  She had been on some SSRIs previously, which was felt to likely be the source of her weight gain.  Her blood pressure is noted to be stable when she was recently in the ER..  She is currently on lisinopril, which seems to be doing a fair job at controlling her blood pressures.  Her mom also has a history of hypertension at an early age.        6/5/2023    10:53 AM 7/3/2023    12:53 PM 7/27/2023     3:09 PM   RAHAT-7 SCORE   Total Score 3 (minimal anxiety) 5 (mild anxiety) 12 (moderate anxiety)   Total Score 3 5 12           12/12/2022     7:44 AM 6/5/2023    10:48 AM 7/27/2023     3:06 PM   PHQ   PHQ-9 Total Score 7 9 19   Q9: Thoughts of better off dead/self-harm past 2 weeks Not at all Not at all Not at all         History of Present Illness       Mental Health Follow-up:  Patient presents to follow-up on Depression & Anxiety.Patient's depression since last visit has been:  Worse  The patient is having other symptoms associated with depression.  Patient's anxiety since last visit has been:  Worse  The patient is having other symptoms associated with anxiety.  Any significant life events: job concerns, financial concerns and grief or loss  Patient is feeling anxious or having panic attacks.  Patient has no concerns about  "alcohol or drug use.    Headaches:   Since the patient's last clinic visit, headaches are: worsened  The patient is getting headaches:  Daily  She is not able to do normal daily activities when she has a migraine.  The patient is taking the following rescue/relief medications:  Ibuprofen (Advil, Motrin) and Tylenol   Patient states \"I get only a small amount of relief\" from the rescue/relief medications.   The patient is taking the following medications to prevent migraines:  No medications to prevent migraines  In the past 4 weeks, the patient has gone to an Urgent Care or Emergency Room 0 times times due to headaches.    Reason for visit:  Condution is worsening and living has been insanely miserable    She eats 2-3 servings of fruits and vegetables daily.She consumes 1 sweetened beverage(s) daily.She exercises with enough effort to increase her heart rate 10 to 19 minutes per day.  She exercises with enough effort to increase her heart rate 3 or less days per week.   She is taking medications regularly.       Recheck         Review of Systems   Constitutional, HEENT, cardiovascular, pulmonary, GI, , musculoskeletal, neuro, skin, endocrine and psych systems are negative, except as otherwise noted.      Objective    Pulse 100   Temp 98.5  F (36.9  C)   Resp 16   Wt 91.8 kg (202 lb 6.4 oz)   LMP 07/14/2023 (Approximate)   BMI 33.17 kg/m    Body mass index is 33.17 kg/m .  Physical Exam  Constitutional:       Appearance: Normal appearance.   HENT:      Head: Normocephalic.      Nose: Congestion present. No rhinorrhea.   Eyes:      Extraocular Movements: Extraocular movements intact.      Pupils: Pupils are equal, round, and reactive to light.   Cardiovascular:      Rate and Rhythm: Normal rate and regular rhythm.      Heart sounds: Normal heart sounds. No murmur heard.  Pulmonary:      Effort: Pulmonary effort is normal.      Breath sounds: Normal breath sounds. No wheezing, rhonchi or rales.   Abdominal:      " General: Abdomen is flat. Bowel sounds are normal. There is no distension.      Palpations: There is no mass.      Tenderness: There is no abdominal tenderness. There is no guarding or rebound.      Hernia: No hernia is present.   Musculoskeletal:      Cervical back: Normal range of motion and neck supple.   Lymphadenopathy:      Cervical: No cervical adenopathy.   Neurological:      Mental Status: She is alert.   Psychiatric:         Mood and Affect: Mood normal.         Behavior: Behavior normal.          Assessment & Plan     ICD-10-CM    1. Nausea and vomiting, unspecified vomiting type  R11.2 Adult GI  Referral - Consult Only     US Abdomen Complete      2. Anxiety and depression  F41.9 Adult Mental Health  Referral    F32.A       3. Hypokalemia  E87.6 Potassium     Potassium      4. Multiple food allergies  Z91.018       5. Chronic frontal sinusitis  J32.1       6. Chronic nonintractable headache, unspecified headache type  R51.9     G89.29       7. Elevated cortisol level  R79.89       8. Essential hypertension, benign  I10         1.  Referred to GI for consideration of endoscopy to see if this would help her symptoms.  She had a fairly extensive work-up otherwise in the ER involving CT and labs.  Additionally, we will add an ultrasound of her abdomen to look further for the possibility of gallstones that could be causing her symptoms.  2.  Not adequately controlled.  I did refer her to see Gricelda Back NP for psychiatric med management since she is tried a couple things and is not well controlled right now.  3.  Repeat potassium was completed today.  4.  As above, has been referred to an allergist given her multiple food allergies.  5.  She has an appointment for a consult with the ENT surgeon regarding sinus surgery.  6.  Likely related to her chronic sinus issues, does have a history of migraines as well.  7.  Has been referred to endocrinology as well for evaluation.  8.  Stable.   On lisinopril.  No follow-ups on file.    Narcisa Steve MD  [facility] AND [facility]

## 2023-07-29 DIAGNOSIS — J32.4 CHRONIC PANSINUSITIS: ICD-10-CM

## 2023-08-01 RX ORDER — BUDESONIDE 0.5 MG/2ML
INHALANT ORAL
Qty: 360 ML | Refills: 1 | Status: ON HOLD | OUTPATIENT
Start: 2023-08-01 | End: 2023-10-31

## 2023-08-01 NOTE — TELEPHONE ENCOUNTER
budesonide (PULMICORT) 0.5 MG/2ML neb solution   Last Written Prescription Date:  7-  Last Fill Quantity: 120ml,   # refills: 0  Last Office Visit: 6-  Future Office visit:    Next 5 appointments (look out 90 days)      Sep 07, 2023  9:45 AM  (Arrive by 9:30 AM)  Return Visit with Lalita Hewitt MD  [facility] ([facility] [place] ) [address]  [place] [address]  [phone]             Routing refill request to provider for review/approval because:

## 2023-08-09 ENCOUNTER — HOSPITAL ENCOUNTER (OUTPATIENT)
Dept: ULTRASOUND IMAGING | Facility: OTHER | Age: 23
Discharge: HOME | End: 2023-08-09
Attending: FAMILY MEDICINE | Admitting: FAMILY MEDICINE
Payer: COMMERCIAL

## 2023-08-09 DIAGNOSIS — R11.2 NAUSEA AND VOMITING, UNSPECIFIED VOMITING TYPE: ICD-10-CM

## 2023-08-09 PROCEDURE — 76705 ECHO EXAM OF ABDOMEN: CPT

## 2023-08-17 ENCOUNTER — MYC MEDICAL ADVICE (OUTPATIENT)
Dept: FAMILY MEDICINE | Facility: OTHER | Age: 23
End: 2023-08-17
Payer: COMMERCIAL

## 2023-08-17 ENCOUNTER — OFFICE VISIT (OUTPATIENT)
Dept: PSYCHIATRY | Facility: OTHER | Age: 23
End: 2023-08-17
Attending: NURSE PRACTITIONER
Payer: COMMERCIAL

## 2023-08-17 VITALS
RESPIRATION RATE: 18 BRPM | WEIGHT: 204 LBS | HEART RATE: 90 BPM | SYSTOLIC BLOOD PRESSURE: 119 MMHG | TEMPERATURE: 98.5 F | HEIGHT: 65 IN | BODY MASS INDEX: 33.99 KG/M2 | DIASTOLIC BLOOD PRESSURE: 80 MMHG | OXYGEN SATURATION: 95 %

## 2023-08-17 DIAGNOSIS — R79.89 ELEVATED CORTISOL LEVEL: Primary | ICD-10-CM

## 2023-08-17 DIAGNOSIS — F33.1 MODERATE EPISODE OF RECURRENT MAJOR DEPRESSIVE DISORDER (H): Primary | ICD-10-CM

## 2023-08-17 PROCEDURE — 99204 OFFICE O/P NEW MOD 45 MIN: CPT | Performed by: NURSE PRACTITIONER

## 2023-08-17 RX ORDER — ESCITALOPRAM OXALATE 10 MG/1
10 TABLET ORAL DAILY
Qty: 30 TABLET | Refills: 0 | Status: SHIPPED | OUTPATIENT
Start: 2023-08-17 | End: 2023-09-18

## 2023-08-17 ASSESSMENT — ANXIETY QUESTIONNAIRES
7. FEELING AFRAID AS IF SOMETHING AWFUL MIGHT HAPPEN: SEVERAL DAYS
4. TROUBLE RELAXING: NOT AT ALL
GAD7 TOTAL SCORE: 13
GAD7 TOTAL SCORE: 13
5. BEING SO RESTLESS THAT IT IS HARD TO SIT STILL: NOT AT ALL
3. WORRYING TOO MUCH ABOUT DIFFERENT THINGS: NEARLY EVERY DAY
IF YOU CHECKED OFF ANY PROBLEMS ON THIS QUESTIONNAIRE, HOW DIFFICULT HAVE THESE PROBLEMS MADE IT FOR YOU TO DO YOUR WORK, TAKE CARE OF THINGS AT HOME, OR GET ALONG WITH OTHER PEOPLE: VERY DIFFICULT
1. FEELING NERVOUS, ANXIOUS, OR ON EDGE: NEARLY EVERY DAY
6. BECOMING EASILY ANNOYED OR IRRITABLE: NEARLY EVERY DAY
2. NOT BEING ABLE TO STOP OR CONTROL WORRYING: NEARLY EVERY DAY

## 2023-08-17 ASSESSMENT — PATIENT HEALTH QUESTIONNAIRE - PHQ9
SUM OF ALL RESPONSES TO PHQ QUESTIONS 1-9: 15
SUM OF ALL RESPONSES TO PHQ QUESTIONS 1-9: 15
10. IF YOU CHECKED OFF ANY PROBLEMS, HOW DIFFICULT HAVE THESE PROBLEMS MADE IT FOR YOU TO DO YOUR WORK, TAKE CARE OF THINGS AT HOME, OR GET ALONG WITH OTHER PEOPLE: VERY DIFFICULT

## 2023-08-17 ASSESSMENT — PAIN SCALES - GENERAL: PAINLEVEL: MODERATE PAIN (5)

## 2023-08-17 NOTE — NURSING NOTE
"Chief Complaint   Patient presents with    Consult     Depression and anxiety.          Initial /80 (BP Location: Right arm, Patient Position: Sitting, Cuff Size: Adult Regular)   Pulse 90   Temp 98.5  F (36.9  C) (Tympanic)   Resp 18   Ht 1.651 m (5' 5\")   Wt 92.5 kg (204 lb)   LMP 07/14/2023 (Approximate)   SpO2 95%   BMI 33.95 kg/m   Estimated body mass index is 33.95 kg/m  as calculated from the following:    Height as of this encounter: 1.651 m (5' 5\").    Weight as of this encounter: 92.5 kg (204 lb).   Advance Care Directive on file?   Advance Care Directive provided to patient?     FOOD SECURITY SCREENING QUESTIONS:    The next two questions are to help us understand your food security.  If you are feeling you need any assistance in this area, we have resources available to support you today.    Hunger Vital Signs:  Within the past 12 months we worried whether our food would run out before we got money to buy more. Never  Within the past 12 months the food we bought just didn't last and we didn't have money to get more. Never        Faviola Schmitz     "

## 2023-08-17 NOTE — PROGRESS NOTES
"  OUTPATIENT PSYCHIATRIC INITIAL PSYCHIATRIC CONSULTATION     IDENTIFICATION:  Lyn Ny is a 22 year old female referred by PCP for a psychiatric diagnostic assessment.   The patient was a good historian. The information for this assessment was gathered via patient interview and EMR review.      CHIEF COMPLAINT   \" My primary said I should come see you \"    HISTORY OF PRESENT ILLNESS     Patient seen by PCP in May about cravings and weight gain, 50-60 lbs over 3 month period. It was thought that Zoloft had been causing it, so she was switched to Wellbutrin. Patient reports worsening depression and anxiety since this change.    Lots of physical health issues starting around the time of the above change including worsening headaches, daily persistent nausea and vomiting, waking up in the middle of the night choking on vomit, terrible motion sickness and constipation. Admits that the above symptoms started at about the same time as she started Wellbutrin and agrees that they could be correlated.     Mildly elevated cortisol, otherwise labs appear insignificant and work-up as been negative. Elevated BP and was started on blood pressure medication. ED visit 7/24/23  for nausea, vomiting, abdominal pain. Treated symptomatically. CT negative. PCP has concern for cushing disease. Has appointment with endocrinology at [facility] in [place] on Monday. Multiple allergies and sinus issues, working with ENT.     Works at [employer] and taking classes for real estate. Low mood, energy and motivation, poor sleep and concentration combined with medical issues making it very difficult to keep up with things at home, work and school, making her feel worried, tense and anxious.     PSYCHIATRIC HISTORY     Depression and anxiety that started approximately 2 years ago. No history of therapy.    Past Critical History:   SIB [method, most recent]-  None  Suicidal Ideation Hx [passive, active]-  None  Violence/Aggression Hx-  " None  Psychosis Hx-  None  Psych Hosp [ #, most recent, committed]-  None  ECT [#, most recent]-  None  Suicide Attempt [#, most recent, method, regret, disclosure, require medical]  -  None    PSYCHIATRIC REVIEW OF SYMPTOMS     Depression: depressed mood, anhedonia, low energy, insomnia, appetite changes, weight changes, and concentration problems, Anxiety: excessive worry, feeling fearful, nervous/overwhelmed, and separation anxiety, Panic Attacks: denies, Trauma Related: none, Sandra/Hypomania: none, Psychosis: none, Substance Use: N/A, Impulse Control Behaviors: none, Eating Disorder: none, Dysregulation: irritable, ADHD: none, ASD: none; None, Psychosocial: financial hardship, health issues, and occupational / vocational stress    PAST MEDICATION TRIALS   First started on Prozac in October 2022, taken off of it after Mom had a serious adverse effects, problems with electrolytes that landed her in the ICU. Started on Zoloft in November 2022, which seemed to help, but was discontinued due to weight gain. Started on Wellbutrin after that. Prozac, Zoloft, Wellbutrin    CHEMICAL DEPENDENCY      CURRENT: None    Past Use: None      MEDICAL/SURGICAL HISTORY            Medical Team:    PMD- Dr. Narcisa Lord-Zeyad       Therapist- None    Neurologic Hx: [head injury, LOC-duration, seizures, other]  Denies  Cardiac Hx: Denies   LMP/Any chance of pregnancy: Started 8/15/2023   Patient Active Problem List   Diagnosis    Acne    Menorrhagia with irregular cycle    Spondylolysis of lumbar region    Mild persistent asthma without complication    Migraine without aura and without status migrainosus, not intractable    Congestion of paranasal sinus    Multiple food allergies    Anxiety and depression     MEDICAL ROS   A comprehensive 12 point review of systems was performed and found to be negative  except for NEUROLOGICAL:  positive for headaches  BEHAVIOR/PSYCH:  positive for See HPI and Psych ROS    ALLERGY   Azithromycin,  Amoxicillin, Cow's milk [milk (cow)], Doxycycline, Egg white (egg protein), Prozac [fluoxetine], Rifampin, Shrimp, Wheat extract, Hydrocodone, and Wheat germ oil    MEDICATIONS                                                                     bold psych meds     Current Outpatient Medications   Medication Sig    acetaminophen (TYLENOL) 325 MG tablet Take 650 mg by mouth    albuterol (PROAIR HFA/PROVENTIL HFA/VENTOLIN HFA) 108 (90 Base) MCG/ACT inhaler INHALE 2 PUFFS INTO THE LUNGS EVERY 4 HOURS AS NEEDED FOR SHORTNESS OF BREATH / DYSPNEA OR WHEEZING    budesonide (PULMICORT) 0.5 MG/2ML neb solution MIX 240ML CHAS MED SINUS RINSE. ADD 0.5MG BUDESONIDE VIAL. RINSE 1/2 BOTTLE EACH NOSTRIL 2X DAILY    buPROPion (WELLBUTRIN XL) 300 MG 24 hr tablet Take 1 tablet (300 mg) by mouth every morning    EPINEPHrine (ANY BX GENERIC EQUIV) 0.3 MG/0.3ML injection 2-pack Inject 0.3 mLs (0.3 mg) into the muscle as needed for anaphylaxis May repeat one time in 5-15 minutes if response to initial dose is inadequate.    fexofenadine (ALLEGRA) 180 MG tablet Take 1 tablet (180 mg) by mouth daily    lisinopril (ZESTRIL) 20 MG tablet Take 1 tablet (20 mg) by mouth daily    metoclopramide (REGLAN) 10 MG tablet Take 1 tablet (10 mg) by mouth 3 times daily as needed (NAUSEA)    mometasone (NASONEX) 50 MCG/ACT nasal spray Spray 2 sprays into both nostrils daily --- Use after Neti Pot --- OTC okay    montelukast (SINGULAIR) 10 MG tablet Take 1 tablet (10 mg) by mouth At Bedtime    norgestrel-ethinyl estradiol (LO/OVRAL) 0.3-30 MG-MCG tablet Take 1 tablet by mouth daily    ondansetron (ZOFRAN ODT) 4 MG ODT tab Take 1 tablet (4 mg) by mouth every 8 hours as needed for nausea or vomiting    other medical supplies Omron blood pressure cuff & meter.  Diagnosis:  I10.  Length of need:  99.    prochlorperazine (COMPAZINE) 10 MG tablet Take 1 tablet (10 mg) by mouth every 6 hours as needed for nausea or vomiting    SUMAtriptan (IMITREX) 50 MG tablet  "Take 1 tablet (50 mg) by mouth at onset of headache for migraine May repeat in 2 hours. Max 4 tablets/24 hours.    vitamin B complex with vitamin C (VITAMIN  B COMPLEX) tablet Take 1 tablet by mouth daily -- make sure it has B12 in tablet    vitamin B-12 (CYANOCOBALAMIN) 2500 MCG sublingual tablet Take 1 tablet (2,500 mcg) by mouth daily     Current Facility-Administered Medications   Medication    ondansetron (ZOFRAN ODT) ODT tab 4 mg       VITALS   /80 (BP Location: Right arm, Patient Position: Sitting, Cuff Size: Adult Regular)   Pulse 90   Temp 98.5  F (36.9  C) (Tympanic)   Resp 18   Ht 1.651 m (5' 5\")   Wt 92.5 kg (204 lb)   LMP 07/14/2023 (Approximate)   SpO2 95%   BMI 33.95 kg/m       PHQ9                                   6/5/2023    10:48 AM 7/27/2023     3:06 PM 8/17/2023     7:48 AM   PHQ   PHQ-9 Total Score 9 19 15   Q9: Thoughts of better off dead/self-harm past 2 weeks Not at all Not at all Not at all          7/3/2023    12:53 PM 7/27/2023     3:09 PM 8/17/2023     7:49 AM   RAHAT-7 SCORE   Total Score 5 (mild anxiety) 12 (moderate anxiety) 13 (moderate anxiety)   Total Score 5 12 13        LABS                                                                                  PSYCHLAB1;  PSYCHLAB2         Recent Results (from the past 720 hour(s))   Extra Blue Top Tube    Collection Time: 07/24/23  2:40 AM   Result Value Ref Range    Hold Specimen JIC    Extra Red Top Tube    Collection Time: 07/24/23  2:40 AM   Result Value Ref Range    Hold Specimen JIC    Extra Green Top (Lithium Heparin) Tube    Collection Time: 07/24/23  2:40 AM   Result Value Ref Range    Hold Specimen JIC    Extra Purple Top Tube    Collection Time: 07/24/23  2:40 AM   Result Value Ref Range    Hold Specimen JIC    Extra Green Top (Lithium Heparin) ON ICE    Collection Time: 07/24/23  2:40 AM   Result Value Ref Range    Hold Specimen JIC    Comprehensive metabolic panel    Collection Time: 07/24/23  2:40 AM   Result " Value Ref Range    Sodium 137 136 - 145 mmol/L    Potassium 3.2 (L) 3.4 - 5.3 mmol/L    Chloride 99 98 - 107 mmol/L    Carbon Dioxide (CO2) 23 22 - 29 mmol/L    Anion Gap 15 7 - 15 mmol/L    Urea Nitrogen 7.4 6.0 - 20.0 mg/dL    Creatinine 0.83 0.51 - 0.95 mg/dL    Calcium 10.2 (H) 8.6 - 10.0 mg/dL    Glucose 126 (H) 70 - 99 mg/dL    Alkaline Phosphatase 106 (H) 35 - 104 U/L    AST 26 0 - 45 U/L    ALT 39 0 - 50 U/L    Protein Total 8.2 6.4 - 8.3 g/dL    Albumin 4.7 3.5 - 5.2 g/dL    Bilirubin Total 0.6 <=1.2 mg/dL    GFR Estimate >90 >60 mL/min/1.73m2   Lipase    Collection Time: 07/24/23  2:40 AM   Result Value Ref Range    Lipase 22 13 - 60 U/L   Lactic acid whole blood    Collection Time: 07/24/23  2:40 AM   Result Value Ref Range    Lactic Acid 4.0 (HH) 0.7 - 2.0 mmol/L   CBC with platelets and differential    Collection Time: 07/24/23  2:40 AM   Result Value Ref Range    WBC Count 5.5 4.0 - 11.0 10e3/uL    RBC Count 4.91 3.80 - 5.20 10e6/uL    Hemoglobin 14.7 11.7 - 15.7 g/dL    Hematocrit 41.6 35.0 - 47.0 %    MCV 85 78 - 100 fL    MCH 29.9 26.5 - 33.0 pg    MCHC 35.3 31.5 - 36.5 g/dL    RDW 12.4 10.0 - 15.0 %    Platelet Count 373 150 - 450 10e3/uL    % Neutrophils 49 %    % Lymphocytes 41 %    % Monocytes 9 %    % Eosinophils 1 %    % Basophils 0 %    % Immature Granulocytes 0 %    NRBCs per 100 WBC 0 <1 /100    Absolute Neutrophils 2.7 1.6 - 8.3 10e3/uL    Absolute Lymphocytes 2.3 0.8 - 5.3 10e3/uL    Absolute Monocytes 0.5 0.0 - 1.3 10e3/uL    Absolute Eosinophils 0.1 0.0 - 0.7 10e3/uL    Absolute Basophils 0.0 0.0 - 0.2 10e3/uL    Absolute Immature Granulocytes 0.0 <=0.4 10e3/uL    Absolute NRBCs 0.0 10e3/uL   HCG qualitative urine    Collection Time: 07/24/23  4:46 AM   Result Value Ref Range    hCG Urine Qualitative Negative Negative   UA with Microscopic reflex to Culture    Collection Time: 07/24/23  4:46 AM    Specimen: Urine, Clean Catch   Result Value Ref Range    Color Urine Light Yellow  Colorless, Straw, Light Yellow, Yellow    Appearance Urine Clear Clear    Glucose Urine Negative Negative mg/dL    Bilirubin Urine Negative Negative    Ketones Urine Negative Negative mg/dL    Specific Gravity Urine 1.009 1.000 - 1.030    Blood Urine Negative Negative    pH Urine 7.0 5.0 - 9.0    Protein Albumin Urine Negative Negative mg/dL    Urobilinogen Urine Normal Normal, 2.0 mg/dL    Nitrite Urine Negative Negative    Leukocyte Esterase Urine Negative Negative    Bacteria Urine Few (A) None Seen /HPF    Mucus Urine Present (A) None Seen /LPF    RBC Urine 1 <=2 /HPF    WBC Urine 4 <=5 /HPF    Squamous Epithelials Urine 8 (H) <=1 /HPF    Hyaline Casts Urine 1 <=2 /LPF   Lactic acid whole blood    Collection Time: 07/24/23  5:11 AM   Result Value Ref Range    Lactic Acid 1.3 0.7 - 2.0 mmol/L   Potassium    Collection Time: 07/28/23  2:12 PM   Result Value Ref Range    Potassium 4.2 3.4 - 5.3 mmol/L   Reviewed labs completed by previous providers.   FAMILY HISTORY                                                                           patient reported     Family history is significant for:  Mom and maternal grandmother have significant depression. Denies history of bipolar disorder, schizophrenia and suicide attempts.     SOCIAL HISTORY                                                                            patient reported   Early childhood development: From the area, good childhood, raised by mom and dad, two younger siblings, good childhood. Met milestones on time.   Highest level of education: Did well in school, graduated from high school Spring 2021 with associates of arts degree.   Employment/Financial Support-  working at [employer], started Real Estate courses last fall.   Living Situation/Family/Relationships-  Lives with boyfriend in a house that they bought in January in [place], been together 3 years, healthy relationship, getting  in September. Close with her parents and siblings. No children.    Legal- None  Trauma history (self-report)-  None  Social/Spiritual Support- Fiance, parents, siblings  Interests / Hobbies: Likes animals - has puppy, horses, chickens  MENTAL STATUS EXAM                                                                            Alertness:  alert  and oriented  Appearance:  casually groomed  Behavior/Demeanor:  cooperative and pleasant, with good  eye contact.  Speech:  regular rate and rhythm  Psychomotor:  normal or unremarkable    Mood:  depressed  Affect:  restricted and was congruent to speech content.  Thought Process/Associations:  unremarkable   Thought Content:  Thought content was unremarkable for none.    Perception:  none  Insight:  good.  Judgment: good.  Attention/Concentration: impaired  Language:  intact  Fund of Knowledge: average .    Memory:  intact    These cognitive functions grossly appear as described, but were not formally tested.    ASSESSMENT                                                                                             This patient is a 22 year old, female who presents with symptoms consistent with major depressive disorder exacerbated by disease of unknown origin vs. Medication side effects related to Wellbutrin. I highly suspect they are a side effect of the Wellbutrin and therefore I recommended that patient come off of the medication and see if her symptoms improve. Patient agreeable with coming off of Wellbutrin, no taper necessary given her current dose. Discussed management options of medication and/or therapy as well as combination treatment. Patient expressed interest in trialing another medication. Recommended trial of Lexapro as patient hasn't ever trialed it and it tends to be better tolerated than SNRIs. Patient would likely benefit greatly from psychotherapy focused on symptom management, healthy diet and regular exercise.       TREATMENT RISK STATEMENT:  The risks, benefits, alternatives and potential adverse effects have been  explained and are understood by the pt.  The pt agrees to the treatment plan with the ability to do so.   The pt knows to call the clinic for any problems or access emergency care if needed.        DIAGNOSES                   Major depressive disorder, moderate with anxious distress   Chronic, persistent nausea and vomiting - like medication side effect      PLAN                                                                                                                    1)  MEDICATIONS:     Stop Wellbutrin   Start Lexapro 10 mg daily   Start Melatonin 1-6 mg OTC 3-4 hours before bed as needed for sleep    2)  THERAPY:  Psychotherapy encouraged - education on community options provided.     3)  LABS:  None    4)  PT MONITOR [call for probs]:  Worsening symptoms, SI/HI, SEs from meds    5)  REFERRALS [CD, medical, other]:  Patient advised to continue follow-up with PCP and speciality providers regarding medical concerns. Healthy diet and regular exercise encouraged.     6)  RTC:    Follow-up with me in 3-4 weeks      60 minutes face-to-face spent with patient more than 50% of time spent counseling patient on medications, medication side effects, symptom history and management.        Marquita Back NP  Psychiatry Department

## 2023-08-17 NOTE — TELEPHONE ENCOUNTER
Many future labs in from Dr. Pema Pedro.    Patient update on MyChart.    Aleta Boyle RN on 8/17/2023 at 4:17 PM

## 2023-08-24 ENCOUNTER — LAB (OUTPATIENT)
Dept: LAB | Facility: OTHER | Age: 23
End: 2023-08-24
Payer: COMMERCIAL

## 2023-08-24 DIAGNOSIS — R79.89 ELEVATED CORTISOL LEVEL: ICD-10-CM

## 2023-08-24 LAB
PROLACTIN SERPL 3RD IS-MCNC: 21 NG/ML (ref 5–23)
TSH SERPL DL<=0.005 MIU/L-ACNC: 0.84 UIU/ML (ref 0.3–4.2)

## 2023-08-24 PROCEDURE — 82024 ASSAY OF ACTH: CPT | Mod: ZL

## 2023-08-24 PROCEDURE — 36415 COLL VENOUS BLD VENIPUNCTURE: CPT | Mod: ZL

## 2023-08-24 PROCEDURE — 84146 ASSAY OF PROLACTIN: CPT | Mod: ZL

## 2023-08-24 PROCEDURE — 84305 ASSAY OF SOMATOMEDIN: CPT | Mod: ZL

## 2023-08-24 PROCEDURE — 82088 ASSAY OF ALDOSTERONE: CPT | Mod: ZL

## 2023-08-24 PROCEDURE — 84244 ASSAY OF RENIN: CPT | Mod: ZL

## 2023-08-24 PROCEDURE — 82627 DEHYDROEPIANDROSTERONE: CPT | Mod: ZL

## 2023-08-24 PROCEDURE — 83835 ASSAY OF METANEPHRINES: CPT | Mod: ZL

## 2023-08-24 PROCEDURE — 84443 ASSAY THYROID STIM HORMONE: CPT | Mod: ZL

## 2023-08-25 LAB
ACTH PLAS-MCNC: 14 PG/ML
ALDOST SERPL-MCNC: 7.6 NG/DL (ref 0–31)
DHEA-S SERPL-MCNC: 284 UG/DL (ref 35–430)
IGF-I BLD-MCNC: 253 NG/ML (ref 109–372)

## 2023-08-28 ENCOUNTER — TRANSFERRED RECORDS (OUTPATIENT)
Dept: HEALTH INFORMATION MANAGEMENT | Facility: OTHER | Age: 23
End: 2023-08-28
Payer: COMMERCIAL

## 2023-08-28 LAB
ANNOTATION COMMENT IMP: NORMAL
METANEPHS SERPL-SCNC: 0.17 NMOL/L
NORMETANEPHRINE SERPL-SCNC: 0.3 NMOL/L

## 2023-08-30 LAB — RENIN PLAS-CCNC: 64 NG/ML/HR

## 2023-09-05 NOTE — PATIENT INSTRUCTIONS
Start Prednisone 3 days before surgery    Instructions for Sinus Surgery    Recovery - Everyone recovers differently from a general anesthetic.  Symptoms such as fatigue, nausea, light-headedness, and sometimes a low grade fever (up to 100 degrees) are not unusual.  As your body removes the anesthetic drugs from circulation, these symptoms will resolve.  Your nose will be sore after surgery, and you may even have symptoms similar to a sinus infection with headache, congestion, and pressure.  These will resolve with healing.  For several days you may experience bloody drainage from the nose, please use the drip pad as necessary for this.  If there is persistent bleeding, please call the office during business hours or the on call ENT physician after hours.  There are no diet restrictions after sinus surgery, and you can resume your home medications.      Please do not blow your nose until 2 weeks after surgery.       Limit your activity to no strenuous activities until I see you for the first follow-up visit in approximately 2 weeks.      Medications - You were sent home with narcotic pain medication.  If you can tolerate the discomfort during your recovery by using just plain Tylenol or ibuprofen (advil), please do so.  However, do not hesitate to use the stronger pain medication if needed.  If you were sent home with an antibiotic, it is primarily used to help the healing process.  If it causes loose bowel movements or other signs of intolerance, it is appropriate to discontinue it.  By far the most important measure you can take to speed recovery, and maximize the chances of long term success of sinus surgery is using the sinus rinses at least three time per day for the first month after surgery.       Start budesonide irrigations tomorrow.  Use 2 times daily for one month and then as directed.  Start Obi Med saline irrigation tonight and use at least 3 times daily.   Continue twice daily budesonide irrigations  and 2-3 times daily NeilMed saline irrigations for 1 month and then as directed by Dr. Hewitt    Budesonide instructions  Make the saline solution using 2 packages of salt and previously boiled or distilled water.  This will make 240 ml of saline solution.  Mix the entire vial of budesonide into the solution.   Irrigate your nose 2 times a day with the warm budesonide solution using 1 bottle between nostrils in the morning, and one bottle at night.   Use plain louis med saline without the steroid 2-3 times during the afternoon    Perform gentle irrigation for the first week.  Starting 1 week after surgery, you can start to irrigate a bit more forcefully.  The more often you irrigate with the louis med saline, the faster you will heal.      At 3 weeks after surgery you may restart nasal steroids if needed (flonase, nasonex, etc).      Complications - Problems related to sinus surgery almost always are detected during the operation, and special instruction will be given in that situation.  However, unexpected things can happen, and are all related to the structures around the sinus cavities.  Symptoms that should alert you to a possible problem include: severe eye pain or eye swelling, persistent heavy bleeding from the nose, and high fevers with headache and neck pain.  Any of these symptoms should be called into my office or to the on call ENT if after hours.  The most common non-emergency complication of sinus surgery is the formation of scar tissue which can re-block the sinuses.  This is addressed below.    Follow-up -  As you have noted, there are quite a few follow-up visits after sinus surgery.  This is done to aggressively manage the most common complication of this technique, which is scar tissue blocking the sinuses.  These visits will require the examination of your nose and possibly removal of crusts of dry mucous and blood, with possible removal of early scar tissue.  Please prepare for these visits  by using your sinus rinses.    If there are any questions or issues with the above, or if there are other issues that concern you, always feel free to call the clinic and I am happy to speak with you as soon as I can.    Lalita Hewitt D.O.  Otolaryngology/Head and Neck Surgery  Allergy    [phone]   extension [phone]    Start Topamax. Please call the nurse at the number below in 2 weeks and let us know how you are doing.    Thank you for allowing Dr. Hewitt and our ENT team to participate in your care.  If your medications are too expensive, please give the nurse a call.  We can possibly change this medication.  If you have a scheduling or an appointment question please contact our Health Unit Coordinator at their direct line [phone].   ALL nursing questions or concerns can be directed to your ENT nurse, Scott, at: [phone]      This site will help you learn about dietary and lifestyle changes that can help prevent and improve your headaches:    [email or web address]    Recommended reading:  Heal Your Headache, Ender Raza    Take migraine medications as prescribed today.  Please always get a pharmacy consult before starting new medications.  Take magnesium as recommended.  This may cause diarrhea.   Follow up with neurology if recommended  Follow up for sleep study and sleep medicine consult if recommended.    If you have sleep apnea, wear your cpap

## 2023-09-07 ENCOUNTER — PREP FOR PROCEDURE (OUTPATIENT)
Dept: OTOLARYNGOLOGY | Facility: OTHER | Age: 23
End: 2023-09-07

## 2023-09-07 ENCOUNTER — OFFICE VISIT (OUTPATIENT)
Dept: OTOLARYNGOLOGY | Facility: OTHER | Age: 23
End: 2023-09-07
Attending: OTOLARYNGOLOGY
Payer: COMMERCIAL

## 2023-09-07 VITALS
DIASTOLIC BLOOD PRESSURE: 78 MMHG | HEIGHT: 65 IN | HEART RATE: 80 BPM | TEMPERATURE: 96.9 F | WEIGHT: 200 LBS | BODY MASS INDEX: 33.32 KG/M2 | OXYGEN SATURATION: 97 % | SYSTOLIC BLOOD PRESSURE: 115 MMHG

## 2023-09-07 DIAGNOSIS — J30.89 DUST ALLERGY: ICD-10-CM

## 2023-09-07 DIAGNOSIS — J34.2 DEVIATED NASAL SEPTUM: ICD-10-CM

## 2023-09-07 DIAGNOSIS — J32.4 CHRONIC PANSINUSITIS: Primary | ICD-10-CM

## 2023-09-07 DIAGNOSIS — J30.81 ALLERGY TO CATS: ICD-10-CM

## 2023-09-07 DIAGNOSIS — J34.3 NASAL TURBINATE HYPERTROPHY: ICD-10-CM

## 2023-09-07 DIAGNOSIS — G43.009 MIGRAINE WITHOUT AURA AND WITHOUT STATUS MIGRAINOSUS, NOT INTRACTABLE: ICD-10-CM

## 2023-09-07 DIAGNOSIS — J34.2 DNS (DEVIATED NASAL SEPTUM): ICD-10-CM

## 2023-09-07 DIAGNOSIS — J32.9 CHRONIC RHINOSINUSITIS: Primary | ICD-10-CM

## 2023-09-07 PROCEDURE — 99215 OFFICE O/P EST HI 40 MIN: CPT | Mod: 25 | Performed by: OTOLARYNGOLOGY

## 2023-09-07 PROCEDURE — 31231 NASAL ENDOSCOPY DX: CPT | Performed by: OTOLARYNGOLOGY

## 2023-09-07 RX ORDER — TOPIRAMATE 25 MG/1
TABLET, FILM COATED ORAL
Qty: 126 TABLET | Refills: 1 | Status: SHIPPED | OUTPATIENT
Start: 2023-09-07 | End: 2024-02-27

## 2023-09-07 RX ORDER — IPRATROPIUM BROMIDE AND ALBUTEROL SULFATE 2.5; .5 MG/3ML; MG/3ML
1 SOLUTION RESPIRATORY (INHALATION) EVERY 4 HOURS PRN
COMMUNITY
End: 2024-07-01

## 2023-09-07 RX ORDER — PREDNISONE 10 MG/1
TABLET ORAL
Qty: 6 TABLET | Refills: 1 | Status: SHIPPED | OUTPATIENT
Start: 2023-09-07 | End: 2024-02-27

## 2023-09-07 ASSESSMENT — PAIN SCALES - GENERAL: PAINLEVEL: MODERATE PAIN (5)

## 2023-09-07 NOTE — PROGRESS NOTES
"Otolaryngology Progress Note          Lyn Ny is a 22 year old female  Follow-up of chronic nasal obstruction and chronic sinusitis.   Hx of mild persistent asthma and perennial allergic rhinitis and new onset migraines without aura    SNOT 61 with a moderate problem with nasal blockage a severe problem with rhinorrhea, problem as bad as it can be with ear fullness and facial pain and pressure    She saw Nadine on 6/12/2023.    CT sinus dated 6/22/2023 was reviewed.  There is mucoperiosteal thickening throughout the frontal sinuses bilaterally anterior and posterior ethmoids.  The ostiomeatal complexes are occluded there is mucoperiosteal thickening in the maxillary sinuses.  The sphenoid sinus on the right is opacified.  The optic nerve is intact the lamina is intact the skull base is intact Karalis to there is a septal deviation at the mid septum with a large spur and septal contact compensatory turbinate hypertrophy    Hx migraine without aura, onset April 8-10/10 frontal, periorbital pain with photobphobia  Takes excedrin  Migraines occurring 2-3 days/week    Has taken sumatriptan without improvement and side effects    Mri brain normal 5/17/23    serum specific IgE dated 6/15/2023 shows a class III and II positivity to dust mite, class I sensitivity to cat  Cats and dogs at home    Accompanied by significant other Cain    Physical Exam  /78   Pulse 80   Temp 96.9  F (36.1  C) (Tympanic)   Ht 1.651 m (5' 5\")   Wt 90.7 kg (200 lb)   LMP 07/14/2023 (Approximate)   SpO2 97%   BMI 33.28 kg/m    General - The patient is well nourished and well developed, and appears to have good nutritional status.  Alert and oriented to person and place, interactive.  Head and Face - Normocephalic and atraumatic, with no gross asymmetry noted of the contour of the facial features.  The facial nerve is intact, with strong symmetric movements.  Neck-no palpable lymphadenopathy or thyroid mass.  Trachea is " midline.  Eyes - Extraocular movements intact.   Ears- External auditory canals are patent, tympanic membranes are intact without effusion or worrisome retractions   Nose - Nasal mucosa is pink and moist with no abnormal mucus.  No polyps, masses, or purulence noted on examination.  Mouth - Examination of the oral cavity shows pink, healthy, moist mucosa.  No lesions or ulceration noted.  The dentition are in good repair.  The tongue is mobile and midline.  Throat - The walls of the oropharynx were smooth, pink, moist, symmetric, and had no lesions or ulcerations.  The tonsillar pillars and soft palate were symmetric.  The uvula was midline on elevation.        To evaluate the nose and sinuses, I performed rigid nasal endoscopy.  I applied topical nasal lidocaine and neosynephrine.    I began with the LEFT side using a 0 degree rigid nasal endoscope, and then similarly examined the RIGHT side    Findings:  Inferior turbinates:  4+ left, 2 + right  Spur with contact right  Middle turbinate and middle meatus:  No purulence, no polyposis  Superior meatus was evaluated  Frontal recess clear  Mucosa is edematous throughout,  no abigail polyps nor polypoid degeneration  Sphenoethmoidal recess without purulence   Nasopharynx clear, et patent no mass  The patient tolerated the procedure well\    Impression/Plan  Lyn Ny is a 22 year old female    ICD-10-CM    1. Chronic pansinusitis  J32.4 predniSONE (DELTASONE) 10 MG tablet      2. Migraine without aura and without status migrainosus, not intractable  G43.009 topiramate (TOPAMAX) 25 MG tablet      3. DNS (deviated nasal septum)  J34.2       4. Nasal turbinate hypertrophy  J34.3       5. Dust allergy  J30.89       6. Allergy to cats  J30.81             Topamax medication effects are  negative for  flank pain,  blood in the urine,  slowing of thinking, or  paresthesias.   Migraine education     Continue allegra, budesonide irrigations    The risks and complications of  bilateral Endoscopic Sinus Surgery (ESS), septoplasty, turbinate reduction  were openly discussed.  The potential risks include bleeding, general anesthesia, infection, scar formation, need for additional surgery, septal perforation, and rarely injury to the eye with the possibility of blindness,  injury to the brain, meningitis or other intracranial complications.  Nonsurgical options were discussed including prolonged antibioitics and/or oral and topical steroids.   Sinus anatomy and sinus disease were reviewed.  CT sinus was reviewed with the patient.  All questions were answered.     Total  Propel    Risks of oral steroid use were discussed and include psychiatric/mood changes, insomnia, stomach ulcers and potential GI bleeding, blood sugar elevation/worsening diabetes, hip/bone necrosis called avascular necrosis, or bone demineralization.        Continue all current medications recommended by me or my staff.    Continue budesonide irrigations.  Verbal and written education on use provided.    The importance of budesonide irrigations postoperatively was reinforced.    The correct use of nasal irrigations as prescribed will prevent synechiae and allow for proper recovery of the sinus mucosa.       Total exam time spent today including review of the chart, reviewing pertinent prior imaging and notes, obtaining a detailed history and physical exam, education, discussion with the patient and documenting in epic was over 45 minutes .  This did not include procedure time.  We discussed the importance of high flow nasal saline use to prevent nasal secretion stasis, the correct use of nasal steroids, and nasal and sinus anatomy were reviewed.      Lalita Hewitt D.O.  Otolaryngology/Head and Neck Surgery  Allergy

## 2023-09-07 NOTE — LETTER
"    9/7/2023         RE: Lyn Ny  [address] [facility] [facility] [place] [address]        Dear Colleague,    Thank you for referring your patient, Lyn Ny, to the [facility]. Please see a copy of my visit note below.    Otolaryngology Progress Note          Lyn Ny is a 22 year old female  Follow-up of chronic nasal obstruction and chronic sinusitis.   Hx of mild persistent asthma and perennial allergic rhinitis and new onset migraines without aura    SNOT 63 with a moderate problem with nasal blockage a severe problem with rhinorrhea, problem as bad as it can be with ear fullness and facial pain and pressure    She saw Nadine on 6/12/2023.    CT sinus dated 6/22/2023 was reviewed.  There is mucoperiosteal thickening throughout the frontal sinuses bilaterally anterior and posterior ethmoids.  The ostiomeatal complexes are occluded there is mucoperiosteal thickening in the maxillary sinuses.  The sphenoid sinus on the right is opacified.  The optic nerve is intact the lamina is intact the skull base is intact Karalis to there is a septal deviation at the mid septum with a large spur and septal contact compensatory turbinate hypertrophy    Hx migraine without aura, onset April 8-10/10 frontal, periorbital pain with photobphobia  Takes excedrin  Migraines occurring 2-3 days/week    Has taken sumatriptan without improvement and side effects    Mri brain normal 5/17/23    serum specific IgE dated 6/15/2023 shows a class III and II positivity to dust mite, class I sensitivity to cat  Cats and dogs at home    Accompanied by significant other Cain    Physical Exam  /78   Pulse 80   Temp 96.9  F (36.1  C) (Tympanic)   Ht 1.651 m (5' 5\")   Wt 90.7 kg (200 lb)   LMP 07/14/2023 (Approximate)   SpO2 97%   BMI 33.28 kg/m    General - The patient is well nourished and well developed, and appears to have good nutritional status.  Alert and oriented to person and place, interactive.  Head " and Face - Normocephalic and atraumatic, with no gross asymmetry noted of the contour of the facial features.  The facial nerve is intact, with strong symmetric movements.  Neck-no palpable lymphadenopathy or thyroid mass.  Trachea is midline.  Eyes - Extraocular movements intact.   Ears- External auditory canals are patent, tympanic membranes are intact without effusion or worrisome retractions   Nose - Nasal mucosa is pink and moist with no abnormal mucus.  No polyps, masses, or purulence noted on examination.  Mouth - Examination of the oral cavity shows pink, healthy, moist mucosa.  No lesions or ulceration noted.  The dentition are in good repair.  The tongue is mobile and midline.  Throat - The walls of the oropharynx were smooth, pink, moist, symmetric, and had no lesions or ulcerations.  The tonsillar pillars and soft palate were symmetric.  The uvula was midline on elevation.        To evaluate the nose and sinuses, I performed rigid nasal endoscopy.  I applied topical nasal lidocaine and neosynephrine.    I began with the LEFT side using a 0 degree rigid nasal endoscope, and then similarly examined the RIGHT side    Findings:  Inferior turbinates:  4+ left, 2 + right  Spur with contact right  Middle turbinate and middle meatus:  No purulence, no polyposis  Superior meatus was evaluated  Frontal recess clear  Mucosa is edematous throughout,  no abigail polyps nor polypoid degeneration  Sphenoethmoidal recess without purulence   Nasopharynx clear, et patent no mass  The patient tolerated the procedure well\    Impression/Plan  Lyn Ny is a 22 year old female    ICD-10-CM    1. Chronic pansinusitis  J32.4 predniSONE (DELTASONE) 10 MG tablet      2. Migraine without aura and without status migrainosus, not intractable  G43.009 topiramate (TOPAMAX) 25 MG tablet      3. DNS (deviated nasal septum)  J34.2       4. Nasal turbinate hypertrophy  J34.3       5. Dust allergy  J30.89       6. Allergy to cats   J30.81             Topamax medication effects are  negative for  flank pain,  blood in the urine,  slowing of thinking, or  paresthesias.   Migraine education     Continue allegra, budesonide irrigations    The risks and complications of bilateral Endoscopic Sinus Surgery (ESS), septoplasty, turbinate reduction  were openly discussed.  The potential risks include bleeding, general anesthesia, infection, scar formation, need for additional surgery, septal perforation, and rarely injury to the eye with the possibility of blindness,  injury to the brain, meningitis or other intracranial complications.  Nonsurgical options were discussed including prolonged antibioitics and/or oral and topical steroids.   Sinus anatomy and sinus disease were reviewed.  CT sinus was reviewed with the patient.  All questions were answered.     Total  Propel    Risks of oral steroid use were discussed and include psychiatric/mood changes, insomnia, stomach ulcers and potential GI bleeding, blood sugar elevation/worsening diabetes, hip/bone necrosis called avascular necrosis, or bone demineralization.        Continue all current medications recommended by me or my staff.    Continue budesonide irrigations.  Verbal and written education on use provided.    The importance of budesonide irrigations postoperatively was reinforced.    The correct use of nasal irrigations as prescribed will prevent synechiae and allow for proper recovery of the sinus mucosa.       Total exam time spent today including review of the chart, reviewing pertinent prior imaging and notes, obtaining a detailed history and physical exam, education, discussion with the patient and documenting in epic was over 45 minutes .  This did not include procedure time.  We discussed the importance of high flow nasal saline use to prevent nasal secretion stasis, the correct use of nasal steroids, and nasal and sinus anatomy were reviewed.      Lalita Hewitt,  JULIAN.O.  Otolaryngology/Head and Neck Surgery  Allergy      Again, thank you for allowing me to participate in the care of your patient.        Sincerely,        Lalita Hewitt MD

## 2023-09-12 ENCOUNTER — TELEPHONE (OUTPATIENT)
Dept: OTOLARYNGOLOGY | Facility: OTHER | Age: 23
End: 2023-09-12

## 2023-09-12 NOTE — TELEPHONE ENCOUNTER
Patient was called 3 times and voicemails left to schedule  pre op and post op appointments before surgery date with Dr. Hewitt on 10/31/2023.      9/12-letter sent to patient to call and schedule    Perla Duff

## 2023-09-18 ENCOUNTER — TELEPHONE (OUTPATIENT)
Dept: BEHAVIORAL HEALTH | Facility: HOSPITAL | Age: 23
End: 2023-09-18

## 2023-09-18 DIAGNOSIS — F33.1 MODERATE EPISODE OF RECURRENT MAJOR DEPRESSIVE DISORDER (H): ICD-10-CM

## 2023-09-18 NOTE — TELEPHONE ENCOUNTER
Patient would like her lexipro, 10 mg , takes it 1 time per day, refilled, pharmacy is [facility]      Machelle Mesa on 9/18/2023 at 9:48 AM

## 2023-09-19 RX ORDER — ESCITALOPRAM OXALATE 10 MG/1
10 TABLET ORAL DAILY
Qty: 30 TABLET | Refills: 0 | Status: SHIPPED | OUTPATIENT
Start: 2023-09-19 | End: 2024-02-27

## 2023-09-20 NOTE — TELEPHONE ENCOUNTER
Refill approved. Nursing, please call patient and let her know.     Thank you,    Marquita Back NP  Psychiatry Department

## 2023-09-21 ENCOUNTER — TELEPHONE (OUTPATIENT)
Dept: OTOLARYNGOLOGY | Facility: OTHER | Age: 23
End: 2023-09-21

## 2023-09-21 NOTE — TELEPHONE ENCOUNTER
----- Message from Scott Brown RN sent at 9/7/2023 10:28 AM CDT -----  Check how patient doing with Topamax. Any side effects?

## 2023-10-17 ENCOUNTER — TELEPHONE (OUTPATIENT)
Dept: OTOLARYNGOLOGY | Facility: OTHER | Age: 23
End: 2023-10-17

## 2023-10-17 NOTE — TELEPHONE ENCOUNTER
10/17-LM to call us back to let us know about her pre op if it has been schedule or not before procedure scheduled with Dr. Hewitt 10/31      Perla Duff

## 2023-10-18 ENCOUNTER — TELEPHONE (OUTPATIENT)
Dept: FAMILY MEDICINE | Facility: OTHER | Age: 23
End: 2023-10-18
Payer: COMMERCIAL

## 2023-10-18 NOTE — TELEPHONE ENCOUNTER
Patient having surgery,10/31, Sinus Surgery, Dr Dela Cruz, [facility], I have no openings with anyone, do you want to work her in and where.      Debi Chang on 10/18/2023 at 1:52 PM

## 2023-10-23 NOTE — TELEPHONE ENCOUNTER
After the patient's name and date of birth was verified, the patient was told the below information.  Kristine Fuchs LPN..................10/23/2023   1:32 PM

## 2023-10-23 NOTE — PROGRESS NOTES
[facility] AND [facility]  [address]  [place] [address]  Phone: [phone]  Fax: [phone]  Primary Provider: Narcisa Wells  Pre-op Performing Provider: NARCISA WELLS      PREOPERATIVE EVALUATION:  Today's date: 10/26/2023    Lyn is a 23 year old female who presents for a preoperative evaluation.      10/26/2023    10:12 AM   Additional Questions   Roomed by Ronel Conner       Surgical Information:  Surgery/Procedure: Endoscopic Sinus Surgery, Septoplasty, Turbinate Reduction  Surgery Location: [facility]  Surgeon: Dr. Hewitt  Surgery Date: 10/31/2023  Time of Surgery: TBD  Where patient plans to recover: At home with family  Fax number for surgical facility: Note does not need to be faxed, will be available electronically in Epic.    Assessment & Plan     The proposed surgical procedure is considered INTERMEDIATE risk.    Preop general physical exam  She is cleared for surgery as scheduled.  - Basic Metabolic Panel; Future  - CBC with Platelets & Differential ([facility] Only); Future  - Urine HCG; Future  - Urine HCG  - Basic Metabolic Panel  - CBC with Platelets & Differential ([facility] Only)    Nonintractable headache, unspecified chronicity pattern, unspecified headache type  Since she still continues to struggle with her headaches, and has not had relief with use of Topamax, she is referred to neurology.  She also has tried Imitrex, which she states that she did not tolerate.  - Adult Neurology  Referral; Future      She declined COVID and flu vaccines today.    Depression Screening Follow Up        10/26/2023     9:54 AM   PHQ   PHQ-9 Total Score 10   Q9: Thoughts of better off dead/self-harm past 2 weeks Several days   F/U: Thoughts of suicide or self-harm No   F/U: Safety concerns No         10/26/2023     9:54 AM   Last PHQ-9   1.  Little interest or pleasure in doing things 1   2.  Feeling down, depressed, or hopeless 1   3.  Trouble  falling or staying asleep, or sleeping too much 1   4.  Feeling tired or having little energy 1   5.  Poor appetite or overeating 1   6.  Feeling bad about yourself 1   7.  Trouble concentrating 2   8.  Moving slowly or restless 1   Q9: Thoughts of better off dead/self-harm past 2 weeks 1   PHQ-9 Total Score 10   In the past two weeks have you had thoughts of suicide or self harm? No   Do you have concerns about your personal safety or the safety of others? No             RECOMMENDATION:  APPROVAL GIVEN to proceed with proposed procedure, without further diagnostic evaluation.        Subjective       HPI related to upcoming procedure:     Lyn has had chronic issues with sinus congestion.  She had a CT of her sinuses on 6/22/2023.  Showed parous nasal sinus mucosal thickening which appeared consistent with chronic sinusitis.    Topamax has not been helping with her migraines.  Had been on imitrex, but had side effects and has stopped.  Just using tylenol for her headaches.  Having headaches daily.          10/26/2023     9:55 AM   Preop Questions   1. Have you ever had a heart attack or stroke? No   2. Have you ever had surgery on your heart or blood vessels, such as a stent placement, a coronary artery bypass, or surgery on an artery in your head, neck, heart, or legs? No   3. Do you have chest pain with activity? No   4. Do you have a history of  heart failure? No   5. Do you currently have a cold, bronchitis or symptoms of other infection? No   6. Do you have a cough, shortness of breath, or wheezing? No   7. Do you or anyone in your family have previous history of blood clots? No   8. Do you or does anyone in your family have a serious bleeding problem such as prolonged bleeding following surgeries or cuts? No   9. Have you ever had problems with anemia or been told to take iron pills? No   10. Have you had any abnormal blood loss such as black, tarry or bloody stools, or abnormal vaginal bleeding? No   11.  Have you ever had a blood transfusion? No   12. Are you willing to have a blood transfusion if it is medically needed before, during, or after your surgery? Yes   13. Have you or any of your relatives ever had problems with anesthesia? UNKNOWN - no known issues.    14. Do you have sleep apnea, excessive snoring or daytime drowsiness? No   15. Do you have any artifical heart valves or other implanted medical devices like a pacemaker, defibrillator, or continuous glucose monitor? No   16. Do you have artificial joints? No   17. Are you allergic to latex? No   18. Is there any chance that you may be pregnant? No         Preoperative Review of :   reviewed - no record of controlled substances prescribed.          Review of Systems  CONSTITUTIONAL: NEGATIVE for fever, chills, change in weight  INTEGUMENTARY/SKIN: NEGATIVE for worrisome rashes, moles or lesions  EYES: NEGATIVE for vision changes or irritation  ENT/MOUTH: NEGATIVE for ear, mouth and throat problems  RESP: NEGATIVE for significant cough or SOB  CV: NEGATIVE for chest pain, palpitations or peripheral edema  GI: NEGATIVE for nausea, abdominal pain, heartburn, or change in bowel habits  : NEGATIVE for frequency, dysuria, or hematuria  MUSCULOSKELETAL: NEGATIVE for significant arthralgias or myalgia  NEURO: NEGATIVE for weakness, dizziness or paresthesias  ENDOCRINE: NEGATIVE for temperature intolerance, skin/hair changes  HEME: NEGATIVE for bleeding problems  PSYCHIATRIC: NEGATIVE for changes in mood or affect    Patient Active Problem List    Diagnosis Date Noted    Anxiety and depression 07/28/2023     Priority: Medium    Multiple food allergies 07/01/2023     Priority: Medium     Wheat (low), scallops (low), shrimp (moderate), cow's milk (moderate), egg white (moderate).      Migraine without aura and without status migrainosus, not intractable 06/22/2023     Priority: Medium    Congestion of paranasal sinus 06/22/2023     Priority: Medium    Mild  persistent asthma without complication 10/24/2022     Priority: Medium    Acne 02/10/2018     Priority: Medium    Menorrhagia with irregular cycle 02/10/2018     Priority: Medium    Spondylolysis of lumbar region 07/17/2017     Priority: Medium      Past Medical History:   Diagnosis Date    Multiple food allergies 7/1/2023    Wheat (low), scallops (low), shrimp (moderate), cow's milk (moderate), egg white (moderate).    Spondylolysis of lumbar region     No Comments Provided     Past Surgical History:   Procedure Laterality Date    Lumbar fusion of L5  8/2/3017    subsequent infection     Current Outpatient Medications   Medication Sig Dispense Refill    acetaminophen (TYLENOL) 325 MG tablet Take 650 mg by mouth      albuterol (PROAIR HFA/PROVENTIL HFA/VENTOLIN HFA) 108 (90 Base) MCG/ACT inhaler INHALE 2 PUFFS INTO THE LUNGS EVERY 4 HOURS AS NEEDED FOR SHORTNESS OF BREATH / DYSPNEA OR WHEEZING 18 g 11    budesonide (PULMICORT) 0.5 MG/2ML neb solution MIX 240ML CHAS MED SINUS RINSE. ADD 0.5MG BUDESONIDE VIAL. RINSE 1/2 BOTTLE EACH NOSTRIL 2X DAILY 360 mL 1    dexAMETHasone (DECADRON) 1 MG tablet       EPINEPHrine (ANY BX GENERIC EQUIV) 0.3 MG/0.3ML injection 2-pack Inject 0.3 mLs (0.3 mg) into the muscle as needed for anaphylaxis May repeat one time in 5-15 minutes if response to initial dose is inadequate. 2 each 1    escitalopram (LEXAPRO) 10 MG tablet Take 1 tablet (10 mg) by mouth daily 90 tablet 3    escitalopram (LEXAPRO) 10 MG tablet Take 1 tablet (10 mg) by mouth daily 30 tablet 0    fexofenadine (ALLEGRA) 180 MG tablet Take 1 tablet (180 mg) by mouth daily 90 tablet 1    ipratropium - albuterol 0.5 mg/2.5 mg/3 mL (DUONEB) 0.5-2.5 (3) MG/3ML neb solution Take 1 vial by nebulization every 4 hours as needed for other, shortness of breath, wheezing or cough (prn)      lisinopril (ZESTRIL) 20 MG tablet Take 1 tablet (20 mg) by mouth daily 90 tablet 3    metoclopramide (REGLAN) 10 MG tablet Take 1 tablet (10 mg)  by mouth 3 times daily as needed (NAUSEA) 9 tablet 0    mometasone (NASONEX) 50 MCG/ACT nasal spray Spray 2 sprays into both nostrils daily --- Use after Neti Pot --- OTC okay 51 g 1    montelukast (SINGULAIR) 10 MG tablet Take 1 tablet (10 mg) by mouth At Bedtime 90 tablet 1    norgestrel-ethinyl estradiol (LO/OVRAL) 0.3-30 MG-MCG tablet Take 1 tablet by mouth daily 84 tablet 4    ondansetron (ZOFRAN ODT) 4 MG ODT tab Take 1 tablet (4 mg) by mouth every 8 hours as needed for nausea or vomiting 30 tablet 0    other medical supplies Omron blood pressure cuff & meter.  Diagnosis:  I10.  Length of need:  99. 1 each 0    predniSONE (DELTASONE) 10 MG tablet Take 3 tabs after breakfast starting 2 days prior to sinus surgery 6 tablet 1    prochlorperazine (COMPAZINE) 10 MG tablet Take 1 tablet (10 mg) by mouth every 6 hours as needed for nausea or vomiting 30 tablet 1    vitamin B complex with vitamin C (VITAMIN  B COMPLEX) tablet Take 1 tablet by mouth daily -- make sure it has B12 in tablet 100 tablet 1    vitamin B-12 (CYANOCOBALAMIN) 2500 MCG sublingual tablet Take 1 tablet (2,500 mcg) by mouth daily 100 tablet 1    topiramate (TOPAMAX) 25 MG tablet Take 1 tablet (25 mg) by mouth 2 times daily for 14 days, THEN 2 tablets (50 mg) 2 times daily for 14 days, THEN 3 tablets (75 mg) At Bedtime for 14 days. 126 tablet 1       Allergies   Allergen Reactions    Azithromycin Other (See Comments)     Numbness in the right side, neurological changes.     Amoxicillin Nausea and Vomiting     Tolerates cephalexin    Cow's Milk [Milk (Cow)]     Doxycycline Nausea and Vomiting    Egg White (Egg Protein)     Prozac [Fluoxetine] Dizziness and Nausea and Vomiting    Rifampin Nausea and Vomiting    Shrimp      Also allergic to scallops    Wheat Extract     Hydrocodone Nausea     Vomiting    Seasonal Allergies Other (See Comments)     Dust, pollen    Wheat Germ Oil Other (See Comments)     Dust, pollen        Social History     Tobacco Use  "   Smoking status: Never     Passive exposure: Never    Smokeless tobacco: Never    Tobacco comments:     Quit smoking: Mom smokes outside   Substance Use Topics    Alcohol use: No     Family History   Problem Relation Age of Onset    Hypertension Mother         Hypertension,Borderline    Hypertension Father         Hypertension    Hyperlipidemia Father         Hyperlipidemia    Hypertension Maternal Grandmother         Hypertension    Hypertension Maternal Grandfather         Hypertension    Hypertension Paternal Grandfather         Hypertension    Heart Disease Paternal Grandfather         Heart Disease,smoker     History   Drug Use Unknown     Comment: Drug use: No         Objective     /50   Pulse 84   Temp 97.4  F (36.3  C) (Tympanic)   Resp 16   Ht 1.651 m (5' 5\")   Wt 96.1 kg (211 lb 12.8 oz)   LMP 10/22/2023 (Approximate)   SpO2 97%   Breastfeeding No   BMI 35.25 kg/m      Physical Exam    GENERAL APPEARANCE: healthy, alert and no distress     EYES: EOMI, PERRL     HENT: ear canals and TM's normal and nose and mouth without ulcers or lesions     NECK: no adenopathy, no asymmetry, masses, or scars and thyroid normal to palpation     RESP: lungs clear to auscultation - no rales, rhonchi or wheezes     CV: regular rates and rhythm, normal S1 S2, no S3 or S4 and no murmur, click or rub     ABDOMEN:  soft, nontender, no HSM or masses and bowel sounds normal     MS: extremities normal- no gross deformities noted, no evidence of inflammation in joints, FROM in all extremities.     SKIN: no suspicious lesions or rashes     NEURO: Normal strength and tone, sensory exam grossly normal, mentation intact and speech normal     PSYCH: mentation appears normal. and affect normal/bright     LYMPHATICS: No cervical adenopathy    Recent Labs   Lab Test 07/28/23  1412 07/24/23  0240 07/12/23  1217 06/05/23  1039 05/02/23  1628   HGB  --  14.7 13.7   < >  --    PLT  --  373 287   < >  --    NA  --  137 137   < > " 136   POTASSIUM 4.2 3.2* 4.3   < > 3.9   CR  --  0.83 0.77   < > 0.67   A1C  --   --   --   --  5.3    < > = values in this interval not displayed.        Diagnostics:  Results for orders placed or performed in visit on 10/26/23   Urine HCG     Status: Normal   Result Value Ref Range    hCG Urine Qualitative Negative Negative   Basic Metabolic Panel     Status: Normal   Result Value Ref Range    Sodium 136 135 - 145 mmol/L    Potassium 4.3 3.4 - 5.3 mmol/L    Chloride 102 98 - 107 mmol/L    Carbon Dioxide (CO2) 22 22 - 29 mmol/L    Anion Gap 12 7 - 15 mmol/L    Urea Nitrogen 10.4 6.0 - 20.0 mg/dL    Creatinine 0.72 0.51 - 0.95 mg/dL    GFR Estimate >90 >60 mL/min/1.73m2    Calcium 9.9 8.6 - 10.0 mg/dL    Glucose 84 70 - 99 mg/dL   CBC with platelets and differential     Status: None   Result Value Ref Range    WBC Count 7.0 4.0 - 11.0 10e3/uL    RBC Count 4.57 3.80 - 5.20 10e6/uL    Hemoglobin 13.9 11.7 - 15.7 g/dL    Hematocrit 39.7 35.0 - 47.0 %    MCV 87 78 - 100 fL    MCH 30.4 26.5 - 33.0 pg    MCHC 35.0 31.5 - 36.5 g/dL    RDW 11.8 10.0 - 15.0 %    Platelet Count 339 150 - 450 10e3/uL    % Neutrophils 68 %    % Lymphocytes 21 %    % Monocytes 8 %    % Eosinophils 3 %    % Basophils 0 %    % Immature Granulocytes 0 %    NRBCs per 100 WBC 0 <1 /100    Absolute Neutrophils 4.7 1.6 - 8.3 10e3/uL    Absolute Lymphocytes 1.4 0.8 - 5.3 10e3/uL    Absolute Monocytes 0.6 0.0 - 1.3 10e3/uL    Absolute Eosinophils 0.2 0.0 - 0.7 10e3/uL    Absolute Basophils 0.0 0.0 - 0.2 10e3/uL    Absolute Immature Granulocytes 0.0 <=0.4 10e3/uL    Absolute NRBCs 0.0 10e3/uL   CBC with Platelets & Differential ([facility] Only)     Status: None    Narrative    The following orders were created for panel order CBC with Platelets & Differential ([facility] Only).  Procedure                               Abnormality         Status                     ---------                               -----------         ------                     CBC with  platelets and d...[[id number]]                      Final result                 Please view results for these tests on the individual orders.         Revised Cardiac Risk Index (RCRI):  The patient has the following serious cardiovascular risks for perioperative complications:   - No serious cardiac risks = 0 points     RCRI Interpretation: 0 points: Class I (very low risk - 0.4% complication rate)         Signed Electronically by: Narcisa Steve MD  Copy of this evaluation report is provided to requesting physician.

## 2023-10-23 NOTE — TELEPHONE ENCOUNTER
I need 40 minutes for the preop.  I have no approval spots to accommodate this.  The only have 40 minute slot I have prior to her surgery date is a same-day spot on 10/26/23 at 10:20.  Please use that time slot.  Narcisa Steve MD

## 2023-10-26 ENCOUNTER — OFFICE VISIT (OUTPATIENT)
Dept: FAMILY MEDICINE | Facility: OTHER | Age: 23
End: 2023-10-26
Attending: FAMILY MEDICINE
Payer: COMMERCIAL

## 2023-10-26 ENCOUNTER — ANESTHESIA EVENT (OUTPATIENT)
Dept: SURGERY | Facility: HOSPITAL | Age: 23
End: 2023-10-26
Payer: COMMERCIAL

## 2023-10-26 VITALS
OXYGEN SATURATION: 97 % | SYSTOLIC BLOOD PRESSURE: 124 MMHG | TEMPERATURE: 97.4 F | HEIGHT: 65 IN | HEART RATE: 84 BPM | RESPIRATION RATE: 16 BRPM | BODY MASS INDEX: 35.29 KG/M2 | DIASTOLIC BLOOD PRESSURE: 50 MMHG | WEIGHT: 211.8 LBS

## 2023-10-26 DIAGNOSIS — Z01.818 PREOP GENERAL PHYSICAL EXAM: Primary | ICD-10-CM

## 2023-10-26 DIAGNOSIS — R51.9 NONINTRACTABLE HEADACHE, UNSPECIFIED CHRONICITY PATTERN, UNSPECIFIED HEADACHE TYPE: ICD-10-CM

## 2023-10-26 LAB
ANION GAP SERPL CALCULATED.3IONS-SCNC: 12 MMOL/L (ref 7–15)
BASOPHILS # BLD AUTO: 0 10E3/UL (ref 0–0.2)
BASOPHILS NFR BLD AUTO: 0 %
BUN SERPL-MCNC: 10.4 MG/DL (ref 6–20)
CALCIUM SERPL-MCNC: 9.9 MG/DL (ref 8.6–10)
CHLORIDE SERPL-SCNC: 102 MMOL/L (ref 98–107)
CREAT SERPL-MCNC: 0.72 MG/DL (ref 0.51–0.95)
DEPRECATED HCO3 PLAS-SCNC: 22 MMOL/L (ref 22–29)
EGFRCR SERPLBLD CKD-EPI 2021: >90 ML/MIN/1.73M2
EOSINOPHIL # BLD AUTO: 0.2 10E3/UL (ref 0–0.7)
EOSINOPHIL NFR BLD AUTO: 3 %
ERYTHROCYTE [DISTWIDTH] IN BLOOD BY AUTOMATED COUNT: 11.8 % (ref 10–15)
GLUCOSE SERPL-MCNC: 84 MG/DL (ref 70–99)
HCG UR QL: NEGATIVE
HCT VFR BLD AUTO: 39.7 % (ref 35–47)
HGB BLD-MCNC: 13.9 G/DL (ref 11.7–15.7)
IMM GRANULOCYTES # BLD: 0 10E3/UL
IMM GRANULOCYTES NFR BLD: 0 %
LYMPHOCYTES # BLD AUTO: 1.4 10E3/UL (ref 0.8–5.3)
LYMPHOCYTES NFR BLD AUTO: 21 %
MCH RBC QN AUTO: 30.4 PG (ref 26.5–33)
MCHC RBC AUTO-ENTMCNC: 35 G/DL (ref 31.5–36.5)
MCV RBC AUTO: 87 FL (ref 78–100)
MONOCYTES # BLD AUTO: 0.6 10E3/UL (ref 0–1.3)
MONOCYTES NFR BLD AUTO: 8 %
NEUTROPHILS # BLD AUTO: 4.7 10E3/UL (ref 1.6–8.3)
NEUTROPHILS NFR BLD AUTO: 68 %
NRBC # BLD AUTO: 0 10E3/UL
NRBC BLD AUTO-RTO: 0 /100
PLATELET # BLD AUTO: 339 10E3/UL (ref 150–450)
POTASSIUM SERPL-SCNC: 4.3 MMOL/L (ref 3.4–5.3)
RBC # BLD AUTO: 4.57 10E6/UL (ref 3.8–5.2)
SODIUM SERPL-SCNC: 136 MMOL/L (ref 135–145)
WBC # BLD AUTO: 7 10E3/UL (ref 4–11)

## 2023-10-26 PROCEDURE — 36415 COLL VENOUS BLD VENIPUNCTURE: CPT | Mod: ZL | Performed by: FAMILY MEDICINE

## 2023-10-26 PROCEDURE — 85025 COMPLETE CBC W/AUTO DIFF WBC: CPT | Mod: ZL | Performed by: FAMILY MEDICINE

## 2023-10-26 PROCEDURE — 99213 OFFICE O/P EST LOW 20 MIN: CPT | Performed by: FAMILY MEDICINE

## 2023-10-26 PROCEDURE — 80048 BASIC METABOLIC PNL TOTAL CA: CPT | Mod: ZL | Performed by: FAMILY MEDICINE

## 2023-10-26 PROCEDURE — 81025 URINE PREGNANCY TEST: CPT | Mod: ZL | Performed by: FAMILY MEDICINE

## 2023-10-26 RX ORDER — DEXAMETHASONE 1 MG
TABLET ORAL
COMMUNITY
Start: 2023-10-20 | End: 2024-02-27

## 2023-10-26 ASSESSMENT — PATIENT HEALTH QUESTIONNAIRE - PHQ9
10. IF YOU CHECKED OFF ANY PROBLEMS, HOW DIFFICULT HAVE THESE PROBLEMS MADE IT FOR YOU TO DO YOUR WORK, TAKE CARE OF THINGS AT HOME, OR GET ALONG WITH OTHER PEOPLE: SOMEWHAT DIFFICULT
SUM OF ALL RESPONSES TO PHQ QUESTIONS 1-9: 10
SUM OF ALL RESPONSES TO PHQ QUESTIONS 1-9: 10

## 2023-10-26 ASSESSMENT — ASTHMA QUESTIONNAIRES: ACT_TOTALSCORE: 17

## 2023-10-26 ASSESSMENT — PAIN SCALES - GENERAL: PAINLEVEL: SEVERE PAIN (6)

## 2023-10-26 NOTE — LETTER
My Asthma Action Plan    Name: Lyn Ny   YOB: 2000  Date: 10/26/2023   My doctor: Narcisa Steve MD   My clinic: [facility] [place]        My Rescue Medicine:   Albuterol inhaler (Proair/Ventolin/Proventil HFA)  2-4 puffs EVERY 4 HOURS as needed. Use a spacer if recommended by your provider.   My Asthma Severity:   Intermittent / Exercise Induced  Know your asthma triggers: Patient is unaware of triggers             GREEN ZONE   Good Control  I feel good  No cough or wheeze  Can work, sleep and play without asthma symptoms       Take your asthma control medicine every day.     If exercise triggers your asthma, take your rescue medication  15 minutes before exercise or sports, and  During exercise if you have asthma symptoms  Spacer to use with inhaler: If you have a spacer, make sure to use it with your inhaler             YELLOW ZONE Getting Worse  I have ANY of these:  I do not feel good  Cough or wheeze  Chest feels tight  Wake up at night   Keep taking your Green Zone medications  Start taking your rescue medicine:  every 20 minutes for up to 1 hour. Then every 4 hours for 24-48 hours.  If you stay in the Yellow Zone for more than 12-24 hours, contact your doctor.  If you do not return to the Green Zone in 12-24 hours or you get worse, start taking your oral steroid medicine if prescribed by your provider.           RED ZONE Medical Alert - Get Help  I have ANY of these:  I feel awful  Medicine is not helping  Breathing getting harder  Trouble walking or talking  Nose opens wide to breathe       Take your rescue medicine NOW  If your provider has prescribed an oral steroid medicine, start taking it NOW  Call your doctor NOW  If you are still in the Red Zone after 20 minutes and you have not reached your doctor:  Take your rescue medicine again and  Call 911 or go to the emergency room right away    See your regular doctor within 2 weeks of an Emergency Room or Urgent  Care visit for follow-up treatment.          Annual Reminders:  Meet with Asthma Educator,  Flu Shot in the Fall, consider Pneumonia Vaccination for patients with asthma (aged 19 and older).    Pharmacy: [facility] [id number] [place] [address]    Electronically signed by Narcisa Steve MD   Date: 10/26/23                    Asthma Triggers  How To Control Things That Make Your Asthma Worse    Triggers are things that make your asthma worse.  Look at the list below to help you find your triggers and   what you can do about them. You can help prevent asthma flare-ups by staying away from your triggers.      Trigger                                                          What you can do   Cigarette Smoke  Tobacco smoke can make asthma worse. Do not allow smoking in your home, car or around you.  Be sure no one smokes at a child s day care or school.  If you smoke, ask your health care provider for ways to help you quit.  Ask family members to quit too.  Ask your health care provider for a referral to Quit Plan to help you quit smoking, or call 0-782-374-PLAN.     Colds, Flu, Bronchitis  These are common triggers of asthma. Wash your hands often.  Don t touch your eyes, nose or mouth.  Get a flu shot every year.     Dust Mites  These are tiny bugs that live in cloth or carpet. They are too small to see. Wash sheets and blankets in hot water every week.   Encase pillows and mattress in dust mite proof covers.  Avoid having carpet if you can. If you have carpet, vacuum weekly.   Use a dust mask and HEPA vacuum.   Pollen and Outdoor Mold  Some people are allergic to trees, grass, or weed pollen, or molds. Try to keep your windows closed.  Limit time out doors when pollen count is high.   Ask you health care provider about taking medicine during allergy season.     Animal Dander  Some people are allergic to skin flakes, urine or saliva from pets with fur or feathers. Keep pets with fur or  feathers out of your home.    If you can t keep the pet outdoors, then keep the pet out of your bedroom.  Keep the bedroom door closed.  Keep pets off cloth furniture and away from stuffed toys.     Mice, Rats, and Cockroaches  Some people are allergic to the waste from these pests.   Cover food and garbage.  Clean up spills and food crumbs.  Store grease in the refrigerator.   Keep food out of the bedroom.   Indoor Mold  This can be a trigger if your home has high moisture. Fix leaking faucets, pipes, or other sources of water.   Clean moldy surfaces.  Dehumidify basement if it is damp and smelly.   Smoke, Strong Odors, and Sprays  These can reduce air quality. Stay away from strong odors and sprays, such as perfume, powder, hair spray, paints, smoke incense, paint, cleaning products, candles and new carpet.   Exercise or Sports  Some people with asthma have this trigger. Be active!  Ask your doctor about taking medicine before sports or exercise to prevent symptoms.    Warm up for 5-10 minutes before and after sports or exercise.     Other Triggers of Asthma  Cold air:  Cover your nose and mouth with a scarf.  Sometimes laughing or crying can be a trigger.  Some medicines and food can trigger asthma.

## 2023-10-26 NOTE — ANESTHESIA PREPROCEDURE EVALUATION
Anesthesia Pre-Procedure Evaluation    Patient: Lyn Ny   MRN: [id number] : 2000        Procedure : Procedure(s):  Endoscopic Sinus Surgery  Septoplasty, Turbinate Reduction          Past Medical History:   Diagnosis Date     Multiple food allergies 2023    Wheat (low), scallops (low), shrimp (moderate), cow's milk (moderate), egg white (moderate).     Spondylolysis of lumbar region     No Comments Provided      Past Surgical History:   Procedure Laterality Date     Lumbar fusion of L5  3017    subsequent infection      Allergies   Allergen Reactions     Azithromycin Other (See Comments)     Numbness in the right side, neurological changes.      Amoxicillin Nausea and Vomiting     Tolerates cephalexin     Cow's Milk [Milk (Cow)]      Doxycycline Nausea and Vomiting     Egg White (Egg Protein)      Prozac [Fluoxetine] Dizziness and Nausea and Vomiting     Rifampin Nausea and Vomiting     Shrimp      Also allergic to scallops     Wheat Extract      Hydrocodone Nausea     Vomiting     Seasonal Allergies Other (See Comments)     Dust, pollen     Wheat Germ Oil Other (See Comments)     Dust, pollen      Social History     Tobacco Use     Smoking status: Never     Passive exposure: Never     Smokeless tobacco: Never     Tobacco comments:     Quit smoking: Mom smokes outside   Substance Use Topics     Alcohol use: No      Wt Readings from Last 1 Encounters:   10/26/23 96.1 kg (211 lb 12.8 oz)        Anesthesia Evaluation   Pt has had prior anesthetic. Type: General.    No history of anesthetic complications       ROS/MED HX  ENT/Pulmonary:     (+)     BEKA risk factors,   obese,            Mild Persistent, asthma (referred to neurology upcoming appt [facility])  Treatment: Inhaler prn and Nebulizer prn,                 Neurologic:     (+)      migraines (daily per patient 4/10 today),                          Cardiovascular:  - neg cardiovascular ROS     METS/Exercise Tolerance:     Hematologic:  - neg  hematologic  ROS     Musculoskeletal:  - neg musculoskeletal ROS     GI/Hepatic:  - neg GI/hepatic ROS     Renal/Genitourinary:  - neg Renal ROS     Endo:     (+)               Obesity,       Psychiatric/Substance Use:     (+) psychiatric history anxiety and depression       Infectious Disease:  - neg infectious disease ROS     Malignancy:  - neg malignancy ROS     Other:  - neg other ROS          Physical Exam    Airway        Mallampati: II   TM distance: > 3 FB   Neck ROM: full   Mouth opening: > 3 cm    Respiratory Devices and Support         Dental       (+) Minor Abnormalities - some fillings, tiny chips      Cardiovascular   cardiovascular exam normal       Rhythm and rate: regular and normal     Pulmonary   pulmonary exam normal        breath sounds clear to auscultation       OUTSIDE LABS:  CBC:   Lab Results   Component Value Date    WBC 5.5 07/24/2023    WBC 5.0 07/12/2023    HGB 14.7 07/24/2023    HGB 13.7 07/12/2023    HCT 41.6 07/24/2023    HCT 38.7 07/12/2023     07/24/2023     07/12/2023     BMP:   Lab Results   Component Value Date     07/24/2023     07/12/2023    POTASSIUM 4.2 07/28/2023    POTASSIUM 3.2 (L) 07/24/2023    CHLORIDE 99 07/24/2023    CHLORIDE 102 07/12/2023    CO2 23 07/24/2023    CO2 24 07/12/2023    BUN 7.4 07/24/2023    BUN 5.8 (L) 07/12/2023    CR 0.83 07/24/2023    CR 0.77 07/12/2023     (H) 07/24/2023    GLC 82 07/12/2023     COAGS:   Lab Results   Component Value Date    PTT 33 03/29/2018    INR 1.1 07/17/2017     POC:   Lab Results   Component Value Date    HCG Negative 07/24/2023     HEPATIC:   Lab Results   Component Value Date    ALBUMIN 4.7 07/24/2023    PROTTOTAL 8.2 07/24/2023    ALT 39 07/24/2023    AST 26 07/24/2023    ALKPHOS 106 (H) 07/24/2023    BILITOTAL 0.6 07/24/2023    BILIDIRECT 0.04 09/03/2017     OTHER:   Lab Results   Component Value Date    LACT 1.3 07/24/2023    A1C 5.3 05/02/2023    RAVI 10.2 (H) 07/24/2023    LIPASE 22  07/24/2023    TSH 0.84 08/24/2023    SED 20 10/02/2017       Anesthesia Plan    ASA Status:  3    NPO Status:  NPO Appropriate    Anesthesia Type: General.     - Airway: ETT   Induction: Intravenous, Propofol.   Maintenance: Balanced.        Consents    Anesthesia Plan(s) and associated risks, benefits, and realistic alternatives discussed. Questions answered and patient/representative(s) expressed understanding.     - Discussed:     - Discussed with:  Patient            Postoperative Care    Pain management: IV analgesics, Multi-modal analgesia.   PONV prophylaxis: Ondansetron (or other 5HT-3), Dexamethasone or Solumedrol, Scopolamine patch, Background Propofol Infusion     Comments:    Other Comments:  10/23/23    Discussed risks and benefits with patient for general anesthesia including sore throat, nausea, vomiting, aspiration, dental damage, loss of airway, CV complications, stroke, MI, death. Pt wishes to proceed.             MILTON Arevalo CRNA

## 2023-10-26 NOTE — NURSING NOTE
Chief Complaint   Patient presents with    Pre-Op Exam         Medication Reconciliation: complete    Ronel Conner, LPN

## 2023-10-26 NOTE — PATIENT INSTRUCTIONS
Preparing for Your Surgery  Getting started  A nurse will call you to review your health history and instructions. They will give you an arrival time based on your scheduled surgery time. Please be ready to share:  Your doctor's clinic name and phone number  Your medical, surgical, and anesthesia history  A list of allergies and sensitivities  A list of medicines, including herbal treatments and over-the-counter drugs  Whether the patient has a legal guardian (ask how to send us the papers in advance)  Please tell us if you're pregnant--or if there's any chance you might be pregnant. Some surgeries may injure a fetus (unborn baby), so they require a pregnancy test. Surgeries that are safe for a fetus don't always need a test, and you can choose whether to have one.   If you have a child who's having surgery, please ask for a copy of Preparing for Your Child's Surgery.    Preparing for surgery  Within 10 to 30 days of surgery: Have a pre-op exam (sometimes called an H&P, or History and Physical). This can be done at a clinic or pre-operative center.  If you're having a , you may not need this exam. Talk to your care team.  At your pre-op exam, talk to your care team about all medicines you take. If you need to stop any medicines before surgery, ask when to start taking them again.  We do this for your safety. Many medicines can make you bleed too much during surgery. Some change how well surgery (anesthesia) drugs work.  Call your insurance company to let them know you're having surgery. (If you don't have insurance, call [phone].)  Call your clinic if there's any change in your health. This includes signs of a cold or flu (sore throat, runny nose, cough, rash, fever). It also includes a scrape or scratch near the surgery site.  If you have questions on the day of surgery, call your hospital or surgery center.  Eating and drinking guidelines  For your safety: Unless your surgeon tells you otherwise,  follow the guidelines below.  Eat and drink as usual until 8 hours before you arrive for surgery. After that, no food or milk.  Drink clear liquids until 2 hours before you arrive. These are liquids you can see through, like water, Gatorade, and Propel Water. They also include plain black coffee and tea (no cream or milk), candy, and breath mints. You can spit out gum when you arrive.  If you drink alcohol: Stop drinking it the night before surgery.  If your care team tells you to take medicine on the morning of surgery, it's okay to take it with a sip of water.  Preventing infection  Shower or bathe the night before and morning of your surgery. Follow the instructions your clinic gave you. (If no instructions, use regular soap.)  Don't shave or clip hair near your surgery site. We'll remove the hair if needed.  Don't smoke or vape the morning of surgery. You may chew nicotine gum up to 2 hours before surgery. A nicotine patch is okay.  Note: Some surgeries require you to completely quit smoking and nicotine. Check with your surgeon.  Your care team will make every effort to keep you safe from infection. We will:  Clean our hands often with soap and water (or an alcohol-based hand rub).  Clean the skin at your surgery site with a special soap that kills germs.  Give you a special gown to keep you warm. (Cold raises the risk of infection.)  Wear special hair covers, masks, gowns and gloves during surgery.  Give antibiotic medicine, if prescribed. Not all surgeries need antibiotics.  What to bring on the day of surgery  Photo ID and insurance card  Copy of your health care directive, if you have one  Glasses and hearing aids (bring cases)  You can't wear contacts during surgery  Inhaler and eye drops, if you use them (tell us about these when you arrive)  CPAP machine or breathing device, if you use them  A few personal items, if spending the night  If you have . . .  A pacemaker, ICD (cardiac defibrillator) or other  implant: Bring the ID card.  An implanted stimulator: Bring the remote control.  A legal guardian: Bring a copy of the certified (court-stamped) guardianship papers.  Please remove any jewelry, including body piercings. Leave jewelry and other valuables at home.  If you're going home the day of surgery  You must have a responsible adult drive you home. They should stay with you overnight as well.  If you don't have someone to stay with you, and you aren't safe to go home alone, we may keep you overnight. Insurance often won't pay for this.  After surgery  If it's hard to control your pain or you need more pain medicine, please call your surgeon's office.  Questions?   If you have any questions for your care team, list them here: _________________________________________________________________________________________________________________________________________________________________________ ____________________________________ ____________________________________ ____________________________________  For informational purposes only. Not to replace the advice of your health care provider. Copyright   2003, 2019 [place] [employer]. All rights reserved. Clinically reviewed by Donna Ridley MD. [employer] 708820 - REV 12/22.    How to Take Your Medication Before Surgery  No ibuprofen, aleve or aspirin for at least 7 days prior to surgery.  No vitamins or supplements for at least 7 days prior to surgery.  The morning of surgery, take only Lexapro, Allegra, lisinopril, oral contraceptives,topamax, and any other medications that were recommended by ENT to take prior to surgery with a small sip of water.

## 2023-10-26 NOTE — LETTER
My Depression Action Plan  Name: Lyn Ny   Date of Birth 2000  Date: 10/26/2023    My doctor: Narcisa Steve   My clinic: [facility]  [address]  [place] [address]  [phone]            GREEN    ZONE   Good Control    What it looks like:   Things are going generally well. You have normal ups and downs. You may even feel depressed from time to time, but bad moods usually last less than a day.   What you need to do:  Continue to care for yourself (see self care plan)  Check your depression survival kit and update it as needed  Follow your physician s recommendations including any medication.  Do not stop taking medication unless you consult with your physician first.             YELLOW         ZONE Getting Worse    What it looks like:   Depression is starting to interfere with your life.   It may be hard to get out of bed; you may be starting to isolate yourself from others.  Symptoms of depression are starting to last most all day and this has happened for several days.   You may have suicidal thoughts but they are not constant.   What you need to do:     Call your care team. Your response to treatment will improve if you keep your care team informed of your progress. Yellow periods are signs an adjustment may need to be made.     Continue your self-care.  Just get dressed and ready for the day.  Don't give yourself time to talk yourself out of it.    Talk to someone in your support network.    Open up your Depression Self-Care Plan/Wellness Kit.             RED    ZONE Medical Alert - Get Help    What it looks like:   Depression is seriously interfering with your life.   You may experience these or other symptoms: You can t get out of bed most days, can t work or engage in other necessary activities, you have trouble taking care of basic hygiene, or basic responsibilities, thoughts of suicide or death that will not go away, self-injurious  behavior.     What you need to do:  Call your care team and request a same-day appointment. If they are not available (weekends or after hours) call your local crisis line, emergency room or 911.          Depression Self-Care Plan / Wellness Kit    Many people find that medication and therapy are helpful treatments for managing depression. In addition, making small changes to your everyday life can help to boost your mood and improve your wellbeing. Below are some tips for you to consider. Be sure to talk with your medical provider and/or behavioral health consultant if your symptoms are worsening or not improving.     Sleep   Sleep hygiene  means all of the habits that support good, restful sleep. It includes maintaining a consistent bedtime and wake time, using your bedroom only for sleeping or sex, and keeping the bedroom dark and free of distractions like a computer, smartphone, or television.     Develop a Healthy Routine  Maintain good hygiene. Get out of bed in the morning, make your bed, brush your teeth, take a shower, and get dressed. Don t spend too much time viewing media that makes you feel stressed. Find time to relax each day.    Exercise  Get some form of exercise every day. This will help reduce pain and release endorphins, the  feel good  chemicals in your brain. It can be as simple as just going for a walk or doing some gardening, anything that will get you moving.      Diet  Strive to eat healthy foods, including fruits and vegetables. Drink plenty of water. Avoid excessive sugar, caffeine, alcohol, and other mood-altering substances.     Stay Connected with Others  Stay in touch with friends and family members.    Manage Your Mood  Try deep breathing, massage therapy, biofeedback, or meditation. Take part in fun activities when you can. Try to find something to smile about each day.     Psychotherapy  Be open to working with a therapist if your provider recommends it.     Medication  Be sure to  take your medication as prescribed. Most anti-depressants need to be taken every day. It usually takes several weeks for medications to work. Not all medicines work for all people. It is important to follow-up with your provider to make sure you have a treatment plan that is working for you. Do not stop your medication abruptly without first discussing it with your provider.    Crisis Resources   These hotlines are for both adults and children. They and are open 24 hours a day, 7 days a week unless noted otherwise.    National Suicide Prevention Lifeline   988 or 5-185-057-RTUO (7222)    Crisis Text Line    www.crisistextline.org  Text HOME to 444278 from anywhere in the [place], anytime, about any type of crisis. A live, trained crisis counselor will receive the text and respond quickly.    [place] Lifeline for LGBTQ Youth  A national crisis intervention and suicide lifeline for LGBTQ youth under 25. Provides a safe place to talk without judgement. Call 1-243.348.2377; text START to 286291 or visit www.thetrevorproject.org to talk to a trained counselor.    For [facility] crisis numbers, visit the [facility] website at:  https://[place].gov/dhs/people-we-serve/adults/health-care/mental-health/resources/crisis-contacts.jsp

## 2023-10-31 ENCOUNTER — HOSPITAL ENCOUNTER (OUTPATIENT)
Facility: HOSPITAL | Age: 23
Discharge: HOME | End: 2023-10-31
Attending: OTOLARYNGOLOGY | Admitting: OTOLARYNGOLOGY
Payer: COMMERCIAL

## 2023-10-31 ENCOUNTER — APPOINTMENT (OUTPATIENT)
Dept: LAB | Facility: HOSPITAL | Age: 23
End: 2023-10-31
Attending: OTOLARYNGOLOGY
Payer: COMMERCIAL

## 2023-10-31 ENCOUNTER — ANESTHESIA (OUTPATIENT)
Dept: SURGERY | Facility: HOSPITAL | Age: 23
End: 2023-10-31
Payer: COMMERCIAL

## 2023-10-31 VITALS
OXYGEN SATURATION: 93 % | DIASTOLIC BLOOD PRESSURE: 86 MMHG | BODY MASS INDEX: 35.32 KG/M2 | TEMPERATURE: 98 F | HEIGHT: 65 IN | HEART RATE: 81 BPM | WEIGHT: 212 LBS | SYSTOLIC BLOOD PRESSURE: 124 MMHG | RESPIRATION RATE: 16 BRPM

## 2023-10-31 DIAGNOSIS — J32.4 CHRONIC PANSINUSITIS: ICD-10-CM

## 2023-10-31 DIAGNOSIS — Z98.890 S/P FESS (FUNCTIONAL ENDOSCOPIC SINUS SURGERY): Primary | ICD-10-CM

## 2023-10-31 LAB — HCG UR QL: NEGATIVE

## 2023-10-31 PROCEDURE — 710N000010 HC RECOVERY PHASE 1, LEVEL 2, PER MIN: Performed by: OTOLARYNGOLOGY

## 2023-10-31 PROCEDURE — 250N000009 HC RX 250: Performed by: NURSE ANESTHETIST, CERTIFIED REGISTERED

## 2023-10-31 PROCEDURE — C2625 STENT, NON-COR, TEM W/DEL SY: HCPCS | Performed by: OTOLARYNGOLOGY

## 2023-10-31 PROCEDURE — 30520 REPAIR OF NASAL SEPTUM: CPT | Performed by: OTOLARYNGOLOGY

## 2023-10-31 PROCEDURE — 258N000003 HC RX IP 258 OP 636: Performed by: NURSE ANESTHETIST, CERTIFIED REGISTERED

## 2023-10-31 PROCEDURE — 250N000013 HC RX MED GY IP 250 OP 250 PS 637: Performed by: NURSE ANESTHETIST, CERTIFIED REGISTERED

## 2023-10-31 PROCEDURE — C1726 CATH, BAL DIL, NON-VASCULAR: HCPCS | Performed by: OTOLARYNGOLOGY

## 2023-10-31 PROCEDURE — 710N000012 HC RECOVERY PHASE 2, PER MINUTE: Performed by: OTOLARYNGOLOGY

## 2023-10-31 PROCEDURE — 250N000009 HC RX 250: Performed by: OTOLARYNGOLOGY

## 2023-10-31 PROCEDURE — 61782 SCAN PROC CRANIAL EXTRA: CPT | Performed by: OTOLARYNGOLOGY

## 2023-10-31 PROCEDURE — 272N000001 HC OR GENERAL SUPPLY STERILE: Performed by: OTOLARYNGOLOGY

## 2023-10-31 PROCEDURE — 250N000011 HC RX IP 250 OP 636

## 2023-10-31 PROCEDURE — 250N000009 HC RX 250

## 2023-10-31 PROCEDURE — C9046 COCAINE HCL NASAL SOLUTION: HCPCS | Performed by: OTOLARYNGOLOGY

## 2023-10-31 PROCEDURE — 360N000076 HC SURGERY LEVEL 3, PER MIN: Performed by: OTOLARYNGOLOGY

## 2023-10-31 PROCEDURE — 30520 REPAIR OF NASAL SEPTUM: CPT | Performed by: NURSE ANESTHETIST, CERTIFIED REGISTERED

## 2023-10-31 PROCEDURE — 250N000011 HC RX IP 250 OP 636: Performed by: OTOLARYNGOLOGY

## 2023-10-31 PROCEDURE — 370N000017 HC ANESTHESIA TECHNICAL FEE, PER MIN: Performed by: OTOLARYNGOLOGY

## 2023-10-31 PROCEDURE — 999N000141 HC STATISTIC PRE-PROCEDURE NURSING ASSESSMENT: Performed by: OTOLARYNGOLOGY

## 2023-10-31 PROCEDURE — 250N000011 HC RX IP 250 OP 636: Performed by: NURSE ANESTHETIST, CERTIFIED REGISTERED

## 2023-10-31 PROCEDURE — 88305 TISSUE EXAM BY PATHOLOGIST: CPT | Mod: 26 | Performed by: PATHOLOGY

## 2023-10-31 PROCEDURE — 81025 URINE PREGNANCY TEST: CPT | Performed by: NURSE ANESTHETIST, CERTIFIED REGISTERED

## 2023-10-31 PROCEDURE — 31276 NSL/SINS NDSC FRNT TISS RMVL: CPT | Mod: 50 | Performed by: OTOLARYNGOLOGY

## 2023-10-31 PROCEDURE — 31257 NSL/SINS NDSC TOT W/SPHENDT: CPT | Mod: 50 | Performed by: OTOLARYNGOLOGY

## 2023-10-31 PROCEDURE — 250N000025 HC SEVOFLURANE, PER MIN: Performed by: OTOLARYNGOLOGY

## 2023-10-31 PROCEDURE — 30930 THER FX NASAL INF TURBINATE: CPT | Performed by: OTOLARYNGOLOGY

## 2023-10-31 PROCEDURE — 88305 TISSUE EXAM BY PATHOLOGIST: CPT | Mod: TC | Performed by: OTOLARYNGOLOGY

## 2023-10-31 DEVICE — PROPEL-CONTOUR DISSOLVABLE: Type: IMPLANTABLE DEVICE | Site: NOSE | Status: FUNCTIONAL

## 2023-10-31 RX ORDER — EPINEPHRINE 1 MG/ML
INJECTION, SOLUTION INTRAMUSCULAR; SUBCUTANEOUS
Status: DISCONTINUED
Start: 2023-10-31 | End: 2023-10-31 | Stop reason: WASHOUT

## 2023-10-31 RX ORDER — ONDANSETRON 4 MG/1
4 TABLET, ORALLY DISINTEGRATING ORAL EVERY 30 MIN PRN
Status: DISCONTINUED | OUTPATIENT
Start: 2023-10-31 | End: 2023-10-31 | Stop reason: HOSPADM

## 2023-10-31 RX ORDER — SODIUM CHLORIDE, SODIUM LACTATE, POTASSIUM CHLORIDE, CALCIUM CHLORIDE 600; 310; 30; 20 MG/100ML; MG/100ML; MG/100ML; MG/100ML
INJECTION, SOLUTION INTRAVENOUS CONTINUOUS
Status: DISCONTINUED | OUTPATIENT
Start: 2023-10-31 | End: 2023-10-31 | Stop reason: HOSPADM

## 2023-10-31 RX ORDER — FENTANYL CITRATE 50 UG/ML
50 INJECTION, SOLUTION INTRAMUSCULAR; INTRAVENOUS EVERY 5 MIN PRN
Status: DISCONTINUED | OUTPATIENT
Start: 2023-10-31 | End: 2023-10-31 | Stop reason: HOSPADM

## 2023-10-31 RX ORDER — OXYCODONE HYDROCHLORIDE 5 MG/1
5 TABLET ORAL EVERY 6 HOURS PRN
Qty: 6 TABLET | Refills: 0 | Status: SHIPPED | OUTPATIENT
Start: 2023-10-31 | End: 2023-11-03

## 2023-10-31 RX ORDER — CEFAZOLIN SODIUM 1 G/3ML
INJECTION, POWDER, FOR SOLUTION INTRAMUSCULAR; INTRAVENOUS
Status: DISCONTINUED
Start: 2023-10-31 | End: 2023-10-31 | Stop reason: HOSPADM

## 2023-10-31 RX ORDER — LIDOCAINE HYDROCHLORIDE AND EPINEPHRINE 10; 10 MG/ML; UG/ML
INJECTION, SOLUTION INFILTRATION; PERINEURAL PRN
Status: DISCONTINUED | OUTPATIENT
Start: 2023-10-31 | End: 2023-10-31 | Stop reason: HOSPADM

## 2023-10-31 RX ORDER — DEXMEDETOMIDINE HYDROCHLORIDE 4 UG/ML
INJECTION, SOLUTION INTRAVENOUS PRN
Status: DISCONTINUED | OUTPATIENT
Start: 2023-10-31 | End: 2023-10-31

## 2023-10-31 RX ORDER — ACETAMINOPHEN 325 MG/1
975 TABLET ORAL ONCE
Status: COMPLETED | OUTPATIENT
Start: 2023-10-31 | End: 2023-10-31

## 2023-10-31 RX ORDER — COCAINE HYDROCHLORIDE 40 MG/ML
SOLUTION NASAL PRN
Status: DISCONTINUED | OUTPATIENT
Start: 2023-10-31 | End: 2023-10-31 | Stop reason: HOSPADM

## 2023-10-31 RX ORDER — TRIAMCINOLONE ACETONIDE 40 MG/ML
INJECTION, SUSPENSION INTRA-ARTICULAR; INTRAMUSCULAR PRN
Status: DISCONTINUED | OUTPATIENT
Start: 2023-10-31 | End: 2023-10-31 | Stop reason: HOSPADM

## 2023-10-31 RX ORDER — BUDESONIDE 0.5 MG/2ML
INHALANT ORAL
Qty: 360 ML | Refills: 11 | Status: SHIPPED | OUTPATIENT
Start: 2023-10-31 | End: 2024-04-23

## 2023-10-31 RX ORDER — HYDROMORPHONE HYDROCHLORIDE 1 MG/ML
0.4 INJECTION, SOLUTION INTRAMUSCULAR; INTRAVENOUS; SUBCUTANEOUS EVERY 5 MIN PRN
Status: DISCONTINUED | OUTPATIENT
Start: 2023-10-31 | End: 2023-10-31 | Stop reason: HOSPADM

## 2023-10-31 RX ORDER — LIDOCAINE HYDROCHLORIDE AND EPINEPHRINE 10; 10 MG/ML; UG/ML
INJECTION, SOLUTION INFILTRATION; PERINEURAL
Status: DISCONTINUED
Start: 2023-10-31 | End: 2023-10-31 | Stop reason: HOSPADM

## 2023-10-31 RX ORDER — NALOXONE HYDROCHLORIDE 0.4 MG/ML
0.2 INJECTION, SOLUTION INTRAMUSCULAR; INTRAVENOUS; SUBCUTANEOUS
Status: DISCONTINUED | OUTPATIENT
Start: 2023-10-31 | End: 2023-10-31 | Stop reason: HOSPADM

## 2023-10-31 RX ORDER — FENTANYL CITRATE 50 UG/ML
INJECTION, SOLUTION INTRAMUSCULAR; INTRAVENOUS PRN
Status: DISCONTINUED | OUTPATIENT
Start: 2023-10-31 | End: 2023-10-31

## 2023-10-31 RX ORDER — ONDANSETRON 2 MG/ML
4 INJECTION INTRAMUSCULAR; INTRAVENOUS EVERY 30 MIN PRN
Status: DISCONTINUED | OUTPATIENT
Start: 2023-10-31 | End: 2023-10-31 | Stop reason: HOSPADM

## 2023-10-31 RX ORDER — NALOXONE HYDROCHLORIDE 0.4 MG/ML
0.4 INJECTION, SOLUTION INTRAMUSCULAR; INTRAVENOUS; SUBCUTANEOUS
Status: DISCONTINUED | OUTPATIENT
Start: 2023-10-31 | End: 2023-10-31 | Stop reason: HOSPADM

## 2023-10-31 RX ORDER — LIDOCAINE HYDROCHLORIDE 20 MG/ML
INJECTION, SOLUTION INFILTRATION; PERINEURAL PRN
Status: DISCONTINUED | OUTPATIENT
Start: 2023-10-31 | End: 2023-10-31

## 2023-10-31 RX ORDER — COCAINE HYDROCHLORIDE 40 MG/ML
SOLUTION NASAL
Status: DISCONTINUED
Start: 2023-10-31 | End: 2023-10-31 | Stop reason: HOSPADM

## 2023-10-31 RX ORDER — BUPIVACAINE HYDROCHLORIDE AND EPINEPHRINE 2.5; 5 MG/ML; UG/ML
INJECTION, SOLUTION EPIDURAL; INFILTRATION; INTRACAUDAL; PERINEURAL
Status: DISCONTINUED
Start: 2023-10-31 | End: 2023-10-31 | Stop reason: HOSPADM

## 2023-10-31 RX ORDER — ONDANSETRON 2 MG/ML
INJECTION INTRAMUSCULAR; INTRAVENOUS PRN
Status: DISCONTINUED | OUTPATIENT
Start: 2023-10-31 | End: 2023-10-31

## 2023-10-31 RX ORDER — PROPOFOL 10 MG/ML
INJECTION, EMULSION INTRAVENOUS CONTINUOUS PRN
Status: DISCONTINUED | OUTPATIENT
Start: 2023-10-31 | End: 2023-10-31

## 2023-10-31 RX ORDER — OXYCODONE HYDROCHLORIDE 5 MG/1
5 TABLET ORAL
Status: COMPLETED | OUTPATIENT
Start: 2023-10-31 | End: 2023-10-31

## 2023-10-31 RX ORDER — DEXAMETHASONE SODIUM PHOSPHATE 4 MG/ML
INJECTION, SOLUTION INTRA-ARTICULAR; INTRALESIONAL; INTRAMUSCULAR; INTRAVENOUS; SOFT TISSUE PRN
Status: DISCONTINUED | OUTPATIENT
Start: 2023-10-31 | End: 2023-10-31

## 2023-10-31 RX ORDER — FENTANYL CITRATE 50 UG/ML
25 INJECTION, SOLUTION INTRAMUSCULAR; INTRAVENOUS EVERY 5 MIN PRN
Status: DISCONTINUED | OUTPATIENT
Start: 2023-10-31 | End: 2023-10-31 | Stop reason: HOSPADM

## 2023-10-31 RX ORDER — LIDOCAINE 40 MG/G
CREAM TOPICAL
Status: DISCONTINUED | OUTPATIENT
Start: 2023-10-31 | End: 2023-10-31 | Stop reason: HOSPADM

## 2023-10-31 RX ORDER — LABETALOL 20 MG/4 ML (5 MG/ML) INTRAVENOUS SYRINGE
10
Status: DISCONTINUED | OUTPATIENT
Start: 2023-10-31 | End: 2023-10-31 | Stop reason: HOSPADM

## 2023-10-31 RX ORDER — TRIAMCINOLONE ACETONIDE 40 MG/ML
INJECTION, SUSPENSION INTRA-ARTICULAR; INTRAMUSCULAR
Status: DISCONTINUED
Start: 2023-10-31 | End: 2023-10-31 | Stop reason: HOSPADM

## 2023-10-31 RX ORDER — PREDNISONE 20 MG/1
TABLET ORAL
Qty: 5 TABLET | Refills: 0 | Status: SHIPPED | OUTPATIENT
Start: 2023-11-01 | End: 2024-02-27

## 2023-10-31 RX ORDER — HYDROMORPHONE HYDROCHLORIDE 1 MG/ML
0.2 INJECTION, SOLUTION INTRAMUSCULAR; INTRAVENOUS; SUBCUTANEOUS EVERY 5 MIN PRN
Status: DISCONTINUED | OUTPATIENT
Start: 2023-10-31 | End: 2023-10-31 | Stop reason: HOSPADM

## 2023-10-31 RX ORDER — OXYMETAZOLINE HYDROCHLORIDE 0.05 G/100ML
2 SPRAY NASAL
Status: COMPLETED | OUTPATIENT
Start: 2023-10-31 | End: 2023-10-31

## 2023-10-31 RX ORDER — PROPOFOL 10 MG/ML
INJECTION, EMULSION INTRAVENOUS PRN
Status: DISCONTINUED | OUTPATIENT
Start: 2023-10-31 | End: 2023-10-31

## 2023-10-31 RX ORDER — ALBUTEROL SULFATE 0.83 MG/ML
2.5 SOLUTION RESPIRATORY (INHALATION) EVERY 4 HOURS PRN
Status: DISCONTINUED | OUTPATIENT
Start: 2023-10-31 | End: 2023-10-31 | Stop reason: HOSPADM

## 2023-10-31 RX ORDER — SCOLOPAMINE TRANSDERMAL SYSTEM 1 MG/1
1 PATCH, EXTENDED RELEASE TRANSDERMAL ONCE
Status: DISCONTINUED | OUTPATIENT
Start: 2023-10-31 | End: 2023-10-31 | Stop reason: HOSPADM

## 2023-10-31 RX ORDER — OXYCODONE HYDROCHLORIDE 5 MG/1
10 TABLET ORAL
Status: DISCONTINUED | OUTPATIENT
Start: 2023-10-31 | End: 2023-10-31 | Stop reason: HOSPADM

## 2023-10-31 RX ADMIN — SCOPALAMINE 1 PATCH: 1 PATCH, EXTENDED RELEASE TRANSDERMAL at 08:00

## 2023-10-31 RX ADMIN — SUGAMMADEX 200 MG: 100 INJECTION, SOLUTION INTRAVENOUS at 11:12

## 2023-10-31 RX ADMIN — PROPOFOL 200 MG: 10 INJECTION, EMULSION INTRAVENOUS at 09:34

## 2023-10-31 RX ADMIN — OXYCODONE HYDROCHLORIDE 5 MG: 5 TABLET ORAL at 13:01

## 2023-10-31 RX ADMIN — FENTANYL CITRATE 100 MCG: 50 INJECTION INTRAMUSCULAR; INTRAVENOUS at 09:33

## 2023-10-31 RX ADMIN — PROPOFOL 200 MCG/KG/MIN: 10 INJECTION, EMULSION INTRAVENOUS at 09:41

## 2023-10-31 RX ADMIN — OXYMETHAZOLINE HCL 2 SPRAY: 0.05 SPRAY NASAL at 08:09

## 2023-10-31 RX ADMIN — OXYMETHAZOLINE HCL 2 SPRAY: 0.05 SPRAY NASAL at 08:20

## 2023-10-31 RX ADMIN — ACETAMINOPHEN 975 MG: 325 TABLET, FILM COATED ORAL at 13:57

## 2023-10-31 RX ADMIN — ONDANSETRON 4 MG: 4 TABLET, ORALLY DISINTEGRATING ORAL at 14:42

## 2023-10-31 RX ADMIN — SODIUM CHLORIDE, POTASSIUM CHLORIDE, SODIUM LACTATE AND CALCIUM CHLORIDE: 600; 310; 30; 20 INJECTION, SOLUTION INTRAVENOUS at 09:56

## 2023-10-31 RX ADMIN — OXYMETHAZOLINE HCL 2 SPRAY: 0.05 SPRAY NASAL at 08:00

## 2023-10-31 RX ADMIN — SODIUM CHLORIDE, POTASSIUM CHLORIDE, SODIUM LACTATE AND CALCIUM CHLORIDE: 600; 310; 30; 20 INJECTION, SOLUTION INTRAVENOUS at 08:22

## 2023-10-31 RX ADMIN — DEXMEDETOMIDINE 10 MCG: 100 INJECTION, SOLUTION, CONCENTRATE INTRAVENOUS at 10:06

## 2023-10-31 RX ADMIN — ROCURONIUM BROMIDE 50 MG: 10 INJECTION INTRAVENOUS at 09:34

## 2023-10-31 RX ADMIN — ONDANSETRON 4 MG: 2 INJECTION INTRAMUSCULAR; INTRAVENOUS at 09:32

## 2023-10-31 RX ADMIN — FENTANYL CITRATE 25 MCG: 50 INJECTION, SOLUTION INTRAMUSCULAR; INTRAVENOUS at 12:27

## 2023-10-31 RX ADMIN — MIDAZOLAM 2 MG: 1 INJECTION INTRAMUSCULAR; INTRAVENOUS at 09:33

## 2023-10-31 RX ADMIN — DEXAMETHASONE SODIUM PHOSPHATE 12 MG: 4 INJECTION, SOLUTION INTRA-ARTICULAR; INTRALESIONAL; INTRAMUSCULAR; INTRAVENOUS; SOFT TISSUE at 09:53

## 2023-10-31 RX ADMIN — LIDOCAINE HYDROCHLORIDE 40 MG: 20 INJECTION, SOLUTION INFILTRATION; PERINEURAL at 09:33

## 2023-10-31 RX ADMIN — FENTANYL CITRATE 25 MCG: 50 INJECTION, SOLUTION INTRAMUSCULAR; INTRAVENOUS at 12:20

## 2023-10-31 ASSESSMENT — ACTIVITIES OF DAILY LIVING (ADL)
ADLS_ACUITY_SCORE: 35

## 2023-10-31 NOTE — ANESTHESIA CARE TRANSFER NOTE
Patient: Lyn Ny    Procedure: Procedure(s):  Endoscopic Sinus Surgery  Septoplasty, Turbinate Reduction, Excision Right Vicenta Bullosa       Diagnosis: Chronic rhinosinusitis [J32.9]  Nasal turbinate hypertrophy [J34.3]  Deviated nasal septum [J34.2]  Diagnosis Additional Information: No value filed.    Anesthesia Type:   General     Note:    Oropharynx: spontaneously breathing  Level of Consciousness: iatrogenic sedation  Oxygen Supplementation: face mask  Level of Supplemental Oxygen (L/min / FiO2): 8  Independent Airway: airway patency satisfactory and stable  Dentition: dentition unchanged  Vital Signs Stable: post-procedure vital signs reviewed and stable  Report to RN Given: handoff report given  Patient transferred to: PACU    Handoff Report: Identifed the Patient, Identified the Reponsible Provider, Reviewed the pertinent medical history, Discussed the surgical course, Reviewed Intra-OP anesthesia mangement and issues during anesthesia, Set expectations for post-procedure period and Allowed opportunity for questions and acknowledgement of understanding  Vitals:  Vitals Value Taken Time   BP 95/45 10/31/23 1131   Temp     Pulse 79 10/31/23 1136   Resp 18 10/31/23 1136   SpO2 97 % 10/31/23 1136   Vitals shown include unfiled device data.    Electronically Signed By: Chris Villatoro  October 31, 2023  11:36 AM

## 2023-10-31 NOTE — OR NURSING
Pt's pain getting better, sat at bedside. VSS. IV discontinued. Became nauseated while dressing. Zofran 4 mg tab given. Pt resting in bed.

## 2023-10-31 NOTE — DISCHARGE INSTRUCTIONS
Post-Anesthesia Patient Instructions    IMMEDIATELY FOLLOWING SURGERY:  Do not drive or operate machinery for the first twenty four hours after surgery.  Do not make any important decisions for twenty four hours after surgery or while taking narcotic pain medications or sedatives.  If you develop intractable nausea and vomiting or a severe headache please notify your doctor immediately.    FOLLOW-UP:  Please make an appointment with your surgeon as instructed. You do not need to follow up with anesthesia unless specifically instructed to do so.    WOUND CARE INSTRUCTIONS (if applicable):  Keep a dry clean dressing on the anesthesia/puncture wound site if there is drainage.  Once the wound has quit draining you may leave it open to air.  Generally you should leave the bandage intact for twenty four hours unless there is drainage.  If the epidural site drains for more than 36-48 hours please call the anesthesia department.    QUESTIONS?:  Please feel free to call your physician or the hospital  if you have any questions, and they will be happy to assist you.       Instructions for Sinus Surgery    Recovery - Everyone recovers differently from a general anesthetic.  Symptoms such as fatigue, nausea, light-headedness, and sometimes a low grade fever (up to 100 degrees) are not unusual.  As your body removes the anesthetic drugs from circulation, these symptoms will resolve.  Your nose will be sore after surgery, and you may even have symptoms similar to a sinus infection with headache, congestion, and pressure.  These will resolve with healing.  For several days you may experience bloody drainage from the nose, please use the drip pad as necessary for this.  If there is persistent bleeding, please call the office during business hours or the on call ENT physician after hours.  There are no diet restrictions after sinus surgery, and you can resume your home medications.      Please do not blow your nose until  2 weeks after surgery.       Limit your activity to no strenuous activities until I see you for the first follow-up visit in approximately 2 weeks.      Medications - You were sent home with narcotic pain medication.  If you can tolerate the discomfort during your recovery by using just plain Tylenol or ibuprofen (advil), please do so.  However, do not hesitate to use the stronger pain medication if needed.  If you were sent home with an antibiotic, it is primarily used to help the healing process.  If it causes loose bowel movements or other signs of intolerance, it is appropriate to discontinue it.  By far the most important measure you can take to speed recovery, and maximize the chances of long term success of sinus surgery is using the sinus rinses at least three time per day for the first month after surgery.       Start budesonide irrigations tomorrow.  Use 2 times daily for one month and then as directed.  Start Louis Med saline irrigation tonight and use at least 3 times daily.   Continue twice daily budesonide irrigations and 2-3 times daily NeilMed saline irrigations for 1 month and then as directed by Dr. Hewitt    Budesonide instructions  Make the saline solution using 2 packages of salt and previously boiled or distilled water.  This will make 240 ml of saline solution.  Mix the entire vial of budesonide into the solution.   Irrigate your nose 2 times a day with the warm budesonide solution using 1 bottle between nostrils in the morning, and one bottle at night.   Use plain louis med saline without the steroid 2-3 times during the afternoon    Perform gentle irrigation for the first week.  Starting 1 week after surgery, you can start to irrigate a bit more forcefully.  The more often you irrigate with the louis med saline, the faster you will heal.      At 3 weeks after surgery you may restart nasal steroids if needed (flonase, nasonex, etc).      Complications - Problems related to sinus surgery  almost always are detected during the operation, and special instruction will be given in that situation.  However, unexpected things can happen, and are all related to the structures around the sinus cavities.  Symptoms that should alert you to a possible problem include: severe eye pain or eye swelling, persistent heavy bleeding from the nose, and high fevers with headache and neck pain.  Any of these symptoms should be called into my office or to the on call ENT if after hours.  The most common non-emergency complication of sinus surgery is the formation of scar tissue which can re-block the sinuses.  This is addressed below.    Follow-up -  see Kayleen Cook in 2 weeks.  See Dr. Hewitt in 4-8 weeks.    As you have noted, there are quite a few follow-up visits after sinus surgery.  This is done to aggressively manage the most common complication of this technique, which is scar tissue blocking the sinuses.  These visits will require the examination of your nose and possibly removal of crusts of dry mucous and blood, with possible removal of early scar tissue.  Please prepare for these visits by using your sinus rinses.    If there are any questions or issues with the above, or if there are other issues that concern you, always feel free to call the clinic and I am happy to speak with you as soon as I can.    Lalita Hewitt D.O.  Otolaryngology/Head and Neck Surgery  Allergy    [phone]   extension [phone]

## 2023-10-31 NOTE — OR NURSING
Patient and responsible adult given discharge instructions with no questions regarding instructions. Michele score 19. Pain level 5-6/10. Nausea has passed. Discharged from unit via w/c. Patient discharged to home with .

## 2023-10-31 NOTE — ANESTHESIA PROCEDURE NOTES
Airway       Patient location during procedure: OR       Procedure Start/Stop Times: 10/31/2023 9:37 AM  Staff -        Resident/Fellow: Chris Villatoro       CRNA: Ramos Panda APRN CRNA       Performed By: SRNA  Consent for Airway        Urgency: elective  Indications and Patient Condition       Indications for airway management: antwon-procedural and airway protection       Induction type:intravenous       Mask difficulty assessment: 1 - vent by mask    Final Airway Details       Final airway type: endotracheal airway       Successful airway: TOBI  Endotracheal Airway Details        ETT size (mm): 7.0       Cuffed: yes       Cuff volume (mL): 8       Successful intubation technique: direct laryngoscopy       DL Blade Type: MAC 3       Grade View of Cords: 1       Adjucts: stylet       Position: Center       Measured from: gums/teeth       Secured at (cm): 20       Bite block used: None    Post intubation assessment        Placement verified by: capnometry, equal breath sounds and chest rise        Number of attempts at approach: 1       Number of other approaches attempted: 0       Secured with: plastic tape       Ease of procedure: easy       Dentition: Intact    Medication(s) Administered   Medication Administration Time: 10/31/2023 9:37 AM

## 2023-10-31 NOTE — ANESTHESIA POSTPROCEDURE EVALUATION
Patient: Lyn Ny    Procedure: Procedure(s):  Endoscopic Sinus Surgery  Septoplasty, Turbinate Reduction, Excision Right Vicenta Bullosa       Anesthesia Type:  General    Note:  Disposition: Outpatient   Postop Pain Control: Uneventful            Sign Out: Well controlled pain   PONV: No   Neuro/Psych: Uneventful            Sign Out: Acceptable/Baseline neuro status   Airway/Respiratory: Uneventful            Sign Out: Acceptable/Baseline resp. status   CV/Hemodynamics: Uneventful            Sign Out: Acceptable CV status; No obvious hypovolemia; No obvious fluid overload   Other NRE: NONE   DID A NON-ROUTINE EVENT OCCUR? No       Last vitals:  Vitals Value Taken Time   /71 10/31/23 1245   Temp 98.4  F (36.9  C) 10/31/23 1245   Pulse 73 10/31/23 1247   Resp 12 10/31/23 1248   SpO2 93 % 10/31/23 1248   Vitals shown include unfiled device data.    Electronically Signed By: MILTON Madden CRNA  October 31, 2023  1:03 PM

## 2023-10-31 NOTE — OP NOTE
Otolaryngology Operative Note     Pre-op Diagnosis: Chronic pansinusitis, bilateral inferior turbinate hypertrophy, deviated nasal septum  Post-op Diagnosis:  same    Procedures:    1.  Bilateral frontal sinusotomy  2.  Bilateral total ethmoidectomy  3.  Bilateral sphenoidotomy  4.  Bilateral maxillary antrostomy with tissue removal    5.  Septoplasty  6.  Bilateral submucous reduction inferior turbinates  7.  Excision right roque bullosa  Sinus procedures performed with [employer] navigation    Surgeon:  Lalita Hewitt D.O.  Anesthesia:  General endotracheal  EBL:  20 ml  Findings: diffuse polyposis ethmoid cavity and frontal recess  Complications:  none  Condition:  stable     Description of the Procedure  After surgical consent was obtained the patient was brought back to the operating room and laid in a comfortable and supine position.  The patient was administered a general anesthetic by a member of anesthesia.  The table was turned 180 degrees.  The patient was draped in the normal clean fashion and a timeout was taken.  The bed was placed into reverse Trendelenburg positioning.  A timeout was taken.  Cocaine pledgets were placed into the nares for several minutes and removed.  The lateral nasal wall, septum middle turbinates, inferior turbinates were anesthetized with 1% lidocaine with 1-100,000 epinephrine.    I proceeded with the submucosal reduction of the inferior turbinates. Under endoscopy, I used a 2mm inferior turbinate blade and started on the left side. I made a stab incision at the anterior insertion of the left inferior turbinate and raised a submucoperiosteal tunnel along the medial surface of the left inferior turbinate bone. I then slowly withdrew the shaver blade as I ran the shaver to perform my submucous resection.  Careful attention was turned to the area of the internal nasal valve for complete reduction.  The turbinate was then outfractured with a Bixby.  I then performed the  same procedure on the right side in a similar fashion with outfracture.    I then turned my attention to the septoplasty.  Under endoscopy I made a right hemitransfixion incision and elevated a mucoperichondrial and periosteal flap bilaterally.  I made my cartilaginous incision over 1.5 to 2 cm posterior to the caudal septum and preserved in all strut.  The using a straight suction and a Austin I  the left superior septal deviation involving the perpendicular plate of the ethmoid and superior portion of the quadrangular cartilage and remove the obstructive cartilage and bone with a D knife and a V chisel.  Using a Blakesley forcep I remove the cartilage which was morselized and set aside for later reinsertion.  I then turned my attention of the spur along the right floor involving the vomer.  I dissected posterior and used a V chisel and straight Blakesley to remove the large spur.  The mucoperichondrial flaps were placed back down the septum is intact and midline and was further medialized bluntly with a Nixon.  Next I placed the morselized cartilage into the midline pocket and secured the flaps using horizontal mattress 4-0 Vicryl.    I turned my attention to the sinus portion of the surgery.  I used a 30 degree endoscope and entered the left nares.  The middle turbinate was medialized with a Nixon.  The uncinate process and ethmoid bulla are boggy and with polypoid degeneration.  I used a sinus seeker to reflect the uncinate and removed the uncinate with a backbiting Blakesley.  The natural ostia was identified and the maxillary sinus was entered with a curved suction under navigation.  The antrostomy was enlarged with Lance-Cut forceps.  The mucosa has mild polypoid degeneration the sinus was irrigated with Ancef saline and suctioned clear.  Next I entered the diffusely polypoid ethmoid bulla inferior and medial.  I identified the lamina and dissected along the lamina removing polyps and bony septations  with Lance-Cut and Blakesley forceps.  I used the microdebrider blade to continue dissecting throughout the ethmoid cavity.  The ground lamella was skeletonized and entered the posterior ethmoid inferior and medial with a straight suction.  The skull base was identified and preserved and operated in a posterior to anterior fashion removing polyps and bony septations in a similar fashion.  Next identified the face of the sphenoid and a trans ethmoidal approach and entered the sphenoid sinus inferior and medial with a straight suction under navigation.  The sinuses were similarly irrigated and suctioned hemostasis is adequate.  I then entered the frontal recess and used giraffe forceps to remove polyps.  The frontal sinus was entered with the relieve a balloon and dilated to 8 mm atmospheric pressure.  The balloon was deflated the sinus was irrigated and gently suctioned.  I placed a contour implant into the left frontal sinus.    I turned my attention to the right side and removed the right roque bullosa with a sickle blade and consul scissors.  I then similarly performed a right maxillary antrostomy total ethmoidectomy sphenoidotomy and frontal sinusotomy with similar findings and results of diffuse polyposis throughout the anterior and posterior ethmoid cavity and frontal recess.  On the right there was filling mucus within the right sphenoid which was removed with a straight suction.  The sinuses were similarly irrigated and suctioned hemostasis is achieved with scant use of silver nitrate and is adequate.  A contour implant was also placed into the right frontal sinus.  NasoPore soaked with Kenalog was inserted into the lateral nasal walls bilaterally.  She was handed back over to anesthesia was awakened and brought to the recovery room in stable condition having tolerated the procedure well.

## 2023-11-02 LAB
PATH REPORT.COMMENTS IMP SPEC: NORMAL
PATH REPORT.FINAL DX SPEC: NORMAL
PATH REPORT.GROSS SPEC: NORMAL
PATH REPORT.MICROSCOPIC SPEC OTHER STN: NORMAL
PATH REPORT.RELEVANT HX SPEC: NORMAL
PHOTO IMAGE: NORMAL

## 2023-11-06 NOTE — PROGRESS NOTES
"  Chief Complaint   Patient presents with    Surgical Followup     S/P FESS 10/31       HPI - Lyn Ny is here for their first  postoperative visit, status post endoscopic sinus surgery (with septoplasty and turbinate reduction) performed on 10/31/23.  She reports continued pressure/ pain mostly on right side of her nose. Reports minimal bleeding the first few days, but has resolved.  The sinus rinses are continuing three times per day, and are being tolerated well.  No visual changes.  Budesonide rinses BID.   She has increased post nasal drainage.   No recent use of AH, Singulair or Nasonex.       Operative- 9/25/23  Procedures:    1.  Bilateral frontal sinusotomy  2.  Bilateral total ethmoidectomy  3.  Bilateral sphenoidotomy  4.  Bilateral maxillary antrostomy with tissue removal    5.  Septoplasty  6.  Bilateral submucous reduction inferior turbinates  7.  Excision right roque bullosa  Sinus procedures performed with [employer] navigation     Surgeon:  Lalita Hewitt D.O.  Anesthesia:  General endotracheal  EBL:  20 ml  Findings: diffuse polyposis ethmoid cavity and frontal recess  Complications:  none  Condition:  stable     Physical Exam -   /56 (BP Location: Right arm, Patient Position: Sitting, Cuff Size: Adult Large)   Pulse 86   Temp (!) 96.6  F (35.9  C) (Tympanic)   Resp 18   Ht 1.651 m (5' 5\")   Wt 90.7 kg (200 lb)   LMP 10/22/2023 (Approximate)   SpO2 98%   BMI 33.28 kg/m      General - The patient is awake and alert, and answers questions appropriately during the history and physical.  The vocal quality is hypernasal, but there is no dyspnea or stridor noted.  Eyes - The EOMI, there is no conjuncitval or scleral injection.  Pupils are equally round and reactive to light.  Oral - The oral mucosa is pink and moist.  The tongue is mobile and midline on protrusion, no edema noted.    To evaluate the nose and sinuses in the post operative state, I performed rigid nasal " endoscopy. The nose was anesthetized with home afrin or topical lidocaine and neosynephrine in the office.    I began with the LEFT side using a 0 degree rigid nasal endoscope, and then similarly examined the RIGHT side    Findings:    Inferior turbinates:  Reduced, Healing well.   Bilateral nares resection with #8 Linder.  Normal postoperative secretions were divided.  Middle turbinate and middle meatus: Healing well.  There is NasoPore bilaterally throughout.  Right has large clot along site of roque bowls which was not manipulated.  Antrostomy obstructed  Ethmoid cavity obstructed  Mucosa is  healthy throughout without polyps nor polypoid degeneration  Superior meatus is clear    Nasopharynx is clear    The patient tolerated procedure well    A/P -     ICD-10-CM    1. S/P FESS (functional endoscopic sinus surgery)  Z98.890 oxyCODONE (ROXICODONE) 5 MG tablet      2. Chronic rhinosinusitis  J32.9           Maintain postoperative instructions.   Refilled #6 Oxycodone and no further refills.   Continue w/ Budesonide rinse BID  Continue with Obi med rinse 3+ times daily.   Use nasal saline PRN  Follow up as scheduled with FELICITY JaneC  ENT  [facility]

## 2023-11-07 ENCOUNTER — OFFICE VISIT (OUTPATIENT)
Dept: OTOLARYNGOLOGY | Facility: OTHER | Age: 23
End: 2023-11-07
Attending: OTOLARYNGOLOGY
Payer: COMMERCIAL

## 2023-11-07 VITALS
TEMPERATURE: 96.6 F | BODY MASS INDEX: 33.32 KG/M2 | HEART RATE: 86 BPM | DIASTOLIC BLOOD PRESSURE: 56 MMHG | RESPIRATION RATE: 18 BRPM | SYSTOLIC BLOOD PRESSURE: 112 MMHG | WEIGHT: 200 LBS | OXYGEN SATURATION: 98 % | HEIGHT: 65 IN

## 2023-11-07 DIAGNOSIS — Z98.890 S/P FESS (FUNCTIONAL ENDOSCOPIC SINUS SURGERY): Primary | ICD-10-CM

## 2023-11-07 DIAGNOSIS — J32.9 CHRONIC RHINOSINUSITIS: ICD-10-CM

## 2023-11-07 PROCEDURE — 99024 POSTOP FOLLOW-UP VISIT: CPT | Performed by: PHYSICIAN ASSISTANT

## 2023-11-07 RX ORDER — OXYCODONE HYDROCHLORIDE 5 MG/1
5 TABLET ORAL EVERY 6 HOURS PRN
Qty: 6 TABLET | Refills: 0 | Status: SHIPPED | OUTPATIENT
Start: 2023-11-07 | End: 2024-02-27

## 2023-11-07 ASSESSMENT — PAIN SCALES - GENERAL: PAINLEVEL: MODERATE PAIN (5)

## 2023-11-07 NOTE — PATIENT INSTRUCTIONS
Maintain postoperative instructions.  Continue w/ Budesonide rinse twice a day  Continue with Obi med rinse 3+ times daily.     Follow up as scheduled with Dr. Hewitt   Refilled oxycodone for moderate to severe pain.     Complete Prednisone      Thank you for allowing BLOSSOM Sage and our ENT team to participate in your care.  If your medications are too expensive, please give the nurse a call.  We can possibly change this medication.  If you have a scheduling or an appointment question please contact our Health Unit Coordinator at their direct line [phone].   ALL nursing questions or concerns can be directed to your ENT nurse, Reema at: [phone].

## 2023-11-07 NOTE — LETTER
"    11/7/2023         RE: Lyn Ny  [address] [place] [id number]        Dear Colleague,    [facility] you [facility] patient, Lyn Ny[facility] Please see a copy of my visit note below.      Chief Complaint   Patient presents with     Surgical Followup     S/P FESS 10/31       HPI - Lyn Ny is here for their first  postoperative visit, status post endoscopic sinus surgery (with septoplasty and turbinate reduction) performed on 10/31/23.  She reports continued pressure/ pain mostly on right side of her nose. Reports minimal bleeding the first few days, but has resolved.  The sinus rinses are continuing three times per day, and are being tolerated well.  No visual changes.  Budesonide rinses BID.   She has increased post nasal drainage.   No recent use of AH, Singulair or Nasonex.       Operative- 9/25/23  Procedures:    1.  Bilateral frontal sinusotomy  2.  Bilateral total ethmoidectomy  3.  Bilateral sphenoidotomy  4.  Bilateral maxillary antrostomy with tissue removal    5.  Septoplasty  6.  Bilateral submucous reduction inferior turbinates  7.  Excision right roque bullosa  Sinus procedures performed with [employer] navigation     Surgeon:  Lalita Hewitt D.O.  Anesthesia:  General endotracheal  EBL:  20 ml  Findings: diffuse polyposis ethmoid cavity and frontal recess  Complications:  none  Condition:  stable     Physical Exam -   /56 (BP Location: Right arm, Patient Position: Sitting, Cuff Size: Adult Large)   Pulse 86   Temp (!) 96.6  F (35.9  C) (Tympanic)   Resp 18   Ht 1.651 m (5' 5\")   Wt 90.7 kg (200 lb)   LMP 10/22/2023 (Approximate)   SpO2 98%   BMI 33.28 kg/m      General - The patient is awake and alert, and answers questions appropriately during the history and physical.  The vocal quality is hypernasal, but there is no dyspnea or stridor noted.  Eyes - The EOMI, there is no conjuncitval or scleral injection.  Pupils " are equally round and reactive to light.  Oral - The oral mucosa is pink and moist.  The tongue is mobile and midline on protrusion, no edema noted.    To evaluate the nose and sinuses in the post operative state, I performed rigid nasal endoscopy. The nose was anesthetized with home afrin or topical lidocaine and neosynephrine in the office.    I began with the LEFT side using a 0 degree rigid nasal endoscope, and then similarly examined the RIGHT side    Findings:    Inferior turbinates:  Reduced, Healing well.   Bilateral nares resection with #8 Linder.  Normal postoperative secretions were divided.  Middle turbinate and middle meatus: Healing well.  There is NasoPore bilaterally throughout.  Right has large clot along site of roque bowls which was not manipulated.  Antrostomy obstructed  Ethmoid cavity obstructed  Mucosa is  healthy throughout without polyps nor polypoid degeneration  Superior meatus is clear    Nasopharynx is clear    The patient tolerated procedure well    A/P -     ICD-10-CM    1. S/P FESS (functional endoscopic sinus surgery)  Z98.890 oxyCODONE (ROXICODONE) 5 MG tablet      2. Chronic rhinosinusitis  J32.9           Maintain postoperative instructions.   Refilled #6 Oxycodone and no further refills.   Continue w/ Budesonide rinse BID  Continue with Obi med rinse 3+ times daily.   Use nasal saline PRN  Follow up as scheduled with Dr. Kash Camacho PA-C  ENT  [facility] [place]      Again, thank you for allowing me to participate in the care of your patient.        Sincerely,        Kayleen Camacho PA-C

## 2023-11-13 ENCOUNTER — MEDICAL CORRESPONDENCE (OUTPATIENT)
Dept: HEALTH INFORMATION MANAGEMENT | Facility: OTHER | Age: 23
End: 2023-11-13
Payer: COMMERCIAL

## 2023-11-15 ENCOUNTER — MYC MEDICAL ADVICE (OUTPATIENT)
Dept: FAMILY MEDICINE | Facility: OTHER | Age: 23
End: 2023-11-15
Payer: COMMERCIAL

## 2023-11-15 DIAGNOSIS — R79.89 ELEVATED CORTISOL LEVEL: Primary | ICD-10-CM

## 2023-11-15 NOTE — PROGRESS NOTES
+Otolaryngology Progress Note            Presents for their one month postoperative visit with me status post endoscopic sinus surgery and for follow-up of allergies.    Hx of chronic recurrent sinusitis with nasal polyps and perennial allergic rhinitis        serum specific IgE dated 6/15/2023 shows a class III and II positivity to dust mite, class I sensitivity to cat  Cats and dogs at home       Procedures 10/31:    1.  Bilateral frontal sinusotomy  2.  Bilateral total ethmoidectomy  3.  Bilateral sphenoidotomy  4.  Bilateral maxillary antrostomy with tissue removal    5.  Septoplasty  6.  Bilateral submucous reduction inferior turbinates  7.  Excision right roque bullosa  Sinus procedures performed with [employer] navigation    Findings: diffuse polyposis ethmoid cavity and frontal recess     No heavy bleeding or pain  States nasal breathing is improved  Using budesonide irrigations    SNOT 48    Sino-Nasal Outcome Test (SNOT - 22)    1. Need to Blow Nose: (P) Mild or slight  2. Nasal Blockage: (P) Mild or slight  3. Sneezing: (P) Severe  4. Runny Nose: (P) Moderate  5. Cough: (P) Mild or slight  6. Post-nasal discharge: (P) Mild or slight  7. Thick nasal discharge: (P) Mild or slight  8. Ear fullness: (P) Mild or slight  9. Dizziness: (P) Moderate  10. Ear Pain: (P) Very mild  11. Facial pain/pressure: (P) Moderate  12. Decreased Sense of Smell/Taste: (P) Mild or slight  13. Difficulty falling asleep: (P) Mild or slight  14. Wake up at night: (P) Mild or slight  15. Lack of a good night's sleep: (P) Mild or slight  16. Wake up tired: (P) Moderate  17. Fatigue: (P) Moderate  18. Reduced Productivity: (P) Moderate  19. Reduced Concentration: (P) Moderate  20. Frustrated/restless/irritable: (P) Mild or slight  21. Sad: (P) None  22. Embarrassed: (P) None    Total Score: (P) 48    COPYRIGHT 1996. [facility] [place] [facility]      Physical Exam  /76 (BP Location: Right arm, Patient Position:  "Sitting, Cuff Size: Adult Large)   Pulse 97   Temp 97.4  F (36.3  C) (Temporal)   Ht 1.651 m (5' 5\")   Wt 90.7 kg (199 lb 15.3 oz)   LMP 10/22/2023 (Approximate)   SpO2 98%   BMI 33.27 kg/m        General - The patient is well nourished and well developed, and appears to have good nutritional status.  Alert and oriented to person and place, interactive.  Head and Face - Normocephalic and atraumatic, with no gross asymmetry noted of the contour of the facial features.  The facial nerve is intact, with strong symmetric movements.  Eyes - Extraocular movements intact.   Nose - Nasal mucosa is pink and moist with no abnormal mucus.  The septum was grossly midline, turbinates are without excess edema.  No polyps, masses, or purulence noted on examination.      To evaluate the nose and sinuses in the post operative state, I performed rigid nasal endoscopy. The nose was anesthetized with home afrin or topical lidocaine and neosynephrine in the office.    I began with the LEFT side using a 0 degree rigid nasal endoscope, and then similarly examined the RIGHT side    Findings:  Inferior turbinates:  Lateralized  Septum intact with  small synechiae to the right inferior turbinate  and controlled with silver nitrate.  I used a gold suction to remove normal postoperative secretions from the ethmoid cavity and maxillary antrostomies 45 and straight Blakesley's used to remove mometasone implants to patient tolerance.  She had repeated sneezing throughout debridement  Middle turbinate and middle meatus:  No purulence, no polyposis, no synechiae  Antrostomy clear  Ethmoid cavity clear  The superior meatus and frontal recess are clear  The sphenoethmoid recess is clear  The nasopharynx is clear  Mucosa is healthy throughout without polyps nor polypoid degeneration    Impression/Plan    ICD-10-CM    1. Perennial allergic rhinitis  J30.89 cetirizine (ZYRTEC) 10 MG tablet      2. S/P FESS (functional endoscopic sinus " surgery)  Z98.890          S/p FESS for CRSwNP    Continue budesonide irrigations as indicated bid  Prn use louis med saline  Return in 1 month    Continue allegra 180  Start zyrtec qhs

## 2023-11-16 ENCOUNTER — OFFICE VISIT (OUTPATIENT)
Dept: OTOLARYNGOLOGY | Facility: OTHER | Age: 23
End: 2023-11-16
Attending: PHYSICIAN ASSISTANT
Payer: COMMERCIAL

## 2023-11-16 VITALS
HEIGHT: 65 IN | WEIGHT: 199.96 LBS | HEART RATE: 97 BPM | SYSTOLIC BLOOD PRESSURE: 111 MMHG | DIASTOLIC BLOOD PRESSURE: 76 MMHG | BODY MASS INDEX: 33.31 KG/M2 | TEMPERATURE: 97.4 F | OXYGEN SATURATION: 98 %

## 2023-11-16 DIAGNOSIS — Z98.890 S/P FESS (FUNCTIONAL ENDOSCOPIC SINUS SURGERY): ICD-10-CM

## 2023-11-16 DIAGNOSIS — J30.89 PERENNIAL ALLERGIC RHINITIS: Primary | ICD-10-CM

## 2023-11-16 PROCEDURE — 99024 POSTOP FOLLOW-UP VISIT: CPT | Performed by: OTOLARYNGOLOGY

## 2023-11-16 RX ORDER — CETIRIZINE HYDROCHLORIDE 10 MG/1
TABLET ORAL
Qty: 60 TABLET | Status: SHIPPED | OUTPATIENT
Start: 2023-11-16 | End: 2024-07-01

## 2023-11-16 RX ORDER — OXYCODONE HYDROCHLORIDE 5 MG/1
5 TABLET ORAL EVERY 6 HOURS PRN
Qty: 5 TABLET | Refills: 0 | Status: SHIPPED | OUTPATIENT
Start: 2023-11-16 | End: 2023-11-19

## 2023-11-16 ASSESSMENT — PAIN SCALES - GENERAL: PAINLEVEL: NO PAIN (0)

## 2023-11-16 NOTE — PATIENT INSTRUCTIONS
Thank you for allowing Dr. Hewitt and our ENT team to participate in your care.  If your medications are too expensive, please give the nurse a call.  We can possibly change this medication.  If you have a scheduling or an appointment question please contact our Health Unit Coordinator at their direct line [phone].   ALL nursing questions or concerns can be directed to your ENT nurse, Scott, at: [phone]    Continue Budesonide 2 times a day.    Start Antihistamines per prescription.    Follow-up in one month with ENT.

## 2023-11-16 NOTE — LETTER
11/16/2023         RE: Lyn Ny  [address] [place] [address]        Dear Colleague,    Thank you for referring your patient, Lyn Ny, to the [facility]. Please see a copy of my visit note below.    +Otolaryngology Progress Note            Presents for their one month postoperative visit with me status post endoscopic sinus surgery and for follow-up of allergies.    Hx of chronic recurrent sinusitis with nasal polyps and perennial allergic rhinitis        serum specific IgE dated 6/15/2023 shows a class III and II positivity to dust mite, class I sensitivity to cat  Cats and dogs at home       Procedures 10/31:    1.  Bilateral frontal sinusotomy  2.  Bilateral total ethmoidectomy  3.  Bilateral sphenoidotomy  4.  Bilateral maxillary antrostomy with tissue removal    5.  Septoplasty  6.  Bilateral submucous reduction inferior turbinates  7.  Excision right roque bullosa  Sinus procedures performed with artaculous navigation    Findings: diffuse polyposis ethmoid cavity and frontal recess     No heavy bleeding or pain  States nasal breathing is improved  Using budesonide irrigations    SNOT 48    Sino-Nasal Outcome Test (SNOT - 22)    1. Need to Blow Nose: (P) Mild or slight  2. Nasal Blockage: (P) Mild or slight  3. Sneezing: (P) Severe  4. Runny Nose: (P) Moderate  5. Cough: (P) Mild or slight  6. Post-nasal discharge: (P) Mild or slight  7. Thick nasal discharge: (P) Mild or slight  8. Ear fullness: (P) Mild or slight  9. Dizziness: (P) Moderate  10. Ear Pain: (P) Very mild  11. Facial pain/pressure: (P) Moderate  12. Decreased Sense of Smell/Taste: (P) Mild or slight  13. Difficulty falling asleep: (P) Mild or slight  14. Wake up at night: (P) Mild or slight  15. Lack of a good night's sleep: (P) Mild or slight  16. Wake up tired: (P) Moderate  17. Fatigue: (P) Moderate  18. Reduced Productivity: (P) Moderate  19. Reduced Concentration: (P) Moderate  20.  "Frustrated/restless/irritable: (P) Mild or slight  21. Sad: (P) None  22. Embarrassed: (P) None    Total Score: (P) 48    COPYRIGHT 1996. [facility] [place], [place]      Physical Exam  /76 (BP Location: Right arm, Patient Position: Sitting, Cuff Size: Adult Large)   Pulse 97   Temp 97.4  F (36.3  C) (Temporal)   Ht 1.651 m (5' 5\")   Wt 90.7 kg (199 lb 15.3 oz)   LMP 10/22/2023 (Approximate)   SpO2 98%   BMI 33.27 kg/m        General - The patient is well nourished and well developed, and appears to have good nutritional status.  Alert and oriented to person and place, interactive.  Head and Face - Normocephalic and atraumatic, with no gross asymmetry noted of the contour of the facial features.  The facial nerve is intact, with strong symmetric movements.  Eyes - Extraocular movements intact.   Nose - Nasal mucosa is pink and moist with no abnormal mucus.  The septum was grossly midline, turbinates are without excess edema.  No polyps, masses, or purulence noted on examination.      To evaluate the nose and sinuses in the post operative state, I performed rigid nasal endoscopy. The nose was anesthetized with home afrin or topical lidocaine and neosynephrine in the office.    I began with the LEFT side using a 0 degree rigid nasal endoscope, and then similarly examined the RIGHT side    Findings:  Inferior turbinates:  Lateralized  Septum intact with  small synechiae to the right inferior turbinate  and controlled with silver nitrate.  I used a gold suction to remove normal postoperative secretions from the ethmoid cavity and maxillary antrostomies 45 and straight Blakesley's used to remove mometasone implants to patient tolerance.  She had repeated sneezing throughout debridement  Middle turbinate and middle meatus:  No purulence, no polyposis, no synechiae  Antrostomy clear  Ethmoid cavity clear  The superior meatus and frontal recess are clear  The sphenoethmoid recess is " clear  The nasopharynx is clear  Mucosa is healthy throughout without polyps nor polypoid degeneration    Impression/Plan    ICD-10-CM    1. Perennial allergic rhinitis  J30.89 cetirizine (ZYRTEC) 10 MG tablet      2. S/P FESS (functional endoscopic sinus surgery)  Z98.890          S/p FESS for CRSwNP    Continue budesonide irrigations as indicated bid  Prn use louis med saline  Return in 1 month    Continue allegra 180  Start zyrtec qhs      Again, thank you for allowing me to participate in the care of your patient.        Sincerely,        Lalita Hewitt MD

## 2023-11-20 NOTE — TELEPHONE ENCOUNTER
Lab ordered and requested that results be faxed to Dr. Pedro at [phone]. Please help her to schedule a lab appointment - needs to be drawn at 8 am.  Narcisa Steve MD

## 2023-12-05 ENCOUNTER — OFFICE VISIT (OUTPATIENT)
Dept: OTOLARYNGOLOGY | Facility: OTHER | Age: 23
End: 2023-12-05
Attending: PHYSICIAN ASSISTANT
Payer: COMMERCIAL

## 2023-12-05 VITALS
HEART RATE: 79 BPM | WEIGHT: 199.96 LBS | BODY MASS INDEX: 33.31 KG/M2 | HEIGHT: 65 IN | TEMPERATURE: 97.4 F | DIASTOLIC BLOOD PRESSURE: 70 MMHG | SYSTOLIC BLOOD PRESSURE: 111 MMHG | OXYGEN SATURATION: 83 %

## 2023-12-05 DIAGNOSIS — G43.009 MIGRAINE WITHOUT AURA AND WITHOUT STATUS MIGRAINOSUS, NOT INTRACTABLE: Primary | ICD-10-CM

## 2023-12-05 DIAGNOSIS — R51.9 FACIAL PAIN: ICD-10-CM

## 2023-12-05 DIAGNOSIS — J33.1 POLYPOID SINUS DEGENERATION: ICD-10-CM

## 2023-12-05 DIAGNOSIS — Z98.890 S/P FESS (FUNCTIONAL ENDOSCOPIC SINUS SURGERY): ICD-10-CM

## 2023-12-05 PROCEDURE — 99024 POSTOP FOLLOW-UP VISIT: CPT | Performed by: PHYSICIAN ASSISTANT

## 2023-12-05 RX ORDER — RIZATRIPTAN BENZOATE 5 MG/1
5 TABLET, ORALLY DISINTEGRATING ORAL
Qty: 20 TABLET | Refills: 0 | Status: SHIPPED | OUTPATIENT
Start: 2023-12-05 | End: 2024-02-27

## 2023-12-05 RX ORDER — PREDNISONE 20 MG/1
20 TABLET ORAL DAILY
Qty: 5 TABLET | Refills: 0 | Status: SHIPPED | OUTPATIENT
Start: 2023-12-05 | End: 2023-12-10

## 2023-12-05 ASSESSMENT — PAIN SCALES - GENERAL: PAINLEVEL: SEVERE PAIN (6)

## 2023-12-05 NOTE — PATIENT INSTRUCTIONS
Follow up in Neurology.   Trial of Maxalt for migraines.     Continue with budesonide rinses.   Start Prednisone 20 mg daily for 5 days.   Follow up within 1 month.     Thank you for allowing Kayleen Camacho PA-C and our ENT team to participate in your care.  If your medications are too expensive, please give the nurse a call.  We can possibly change this medication.  If you have a scheduling or an appointment question please contact our Health Unit Coordinator at [phone], Ext. [phone].    ALL nursing questions or concerns can be directed to your ENT nurse at: [phone] [place]

## 2023-12-05 NOTE — LETTER
12/5/2023         RE: Lyn Ny  [address] [address] [facility] [address]        Dear Colleague,    Thank you for referring your patient, Lyn Ny, to the [facility]. Please see a copy of my visit note below.    Chief Complaint   Patient presents with     Surgical Followup     Post op 10/31 FESS/Septo/TR, worsening pain/symptoms       Patient returns ENT for concerns regarding worsening facial pain.  Patient was last seen for routine postoperative exam on 11/16/2023.  At that visit she tolerated debridement well.  She did have 1 small synechiae reduced and mometasone implants removed.  She was recommended to continue with rinses and Allegra Zyrtec.    Today, she has presents for concerns of facial pain. She describes a mild discomfort and felt worsening pain despite using rinses. Her pain was progressive and worse on Friday. She has felt increase in septal pain along her gum/ teeth. She reports the pain has not returned at this time.   She has been not been doing anything to cause the pain.   Intermittent pain about 4 days in a row.     She has been using rinses.   Bleeding from left side.   She is able to breath thru.   Denies fevers, nausea, emesis.     She had some improvement with Topamax but has since quit.   Imitrex did not tolerate.     Status post endoscopic sinus surgery on 10/31/23  Hx of chronic recurrent sinusitis with nasal polyps and perennial allergic rhinitis         serum specific IgE dated 6/15/2023 shows a class III and II positivity to dust mite, class I sensitivity to cat  Cats and dogs at home        Procedures 10/31:    1.  Bilateral frontal sinusotomy  2.  Bilateral total ethmoidectomy  3.  Bilateral sphenoidotomy  4.  Bilateral maxillary antrostomy with tissue removal    5.  Septoplasty  6.  Bilateral submucous reduction inferior turbinates  7.  Excision right roque bullosa  Sinus procedures performed with [employer] navigation     Findings: diffuse polyposis  ethmoid cavity and frontal recess          Past Medical History:   Diagnosis Date     Multiple food allergies 7/1/2023    Wheat (low), scallops (low), shrimp (moderate), cow's milk (moderate), egg white (moderate).     Spondylolysis of lumbar region     No Comments Provided        Allergies   Allergen Reactions     Azithromycin Other (See Comments)     Numbness in the right side, neurological changes.      Amoxicillin Nausea and Vomiting     Tolerates cephalexin     Cow's Milk [Milk (Cow)]      Doxycycline Nausea and Vomiting     Egg White (Egg Protein)      Prozac [Fluoxetine] Dizziness and Nausea and Vomiting     Rifampin Nausea and Vomiting     Shrimp      Also allergic to scallops     Wheat Extract      Hydrocodone Nausea     Vomiting     Seasonal Allergies Other (See Comments)     Dust, pollen     Wheat Germ Oil Other (See Comments)     Dust, pollen     Current Outpatient Medications   Medication     acetaminophen (TYLENOL) 325 MG tablet     albuterol (PROAIR HFA/PROVENTIL HFA/VENTOLIN HFA) 108 (90 Base) MCG/ACT inhaler     budesonide (PULMICORT) 0.5 MG/2ML neb solution     cetirizine (ZYRTEC) 10 MG tablet     dexAMETHasone (DECADRON) 1 MG tablet     EPINEPHrine (ANY BX GENERIC EQUIV) 0.3 MG/0.3ML injection 2-pack     escitalopram (LEXAPRO) 10 MG tablet     escitalopram (LEXAPRO) 10 MG tablet     fexofenadine (ALLEGRA) 180 MG tablet     ipratropium - albuterol 0.5 mg/2.5 mg/3 mL (DUONEB) 0.5-2.5 (3) MG/3ML neb solution     lisinopril (ZESTRIL) 20 MG tablet     metoclopramide (REGLAN) 10 MG tablet     mometasone (NASONEX) 50 MCG/ACT nasal spray     montelukast (SINGULAIR) 10 MG tablet     norgestrel-ethinyl estradiol (LO/OVRAL) 0.3-30 MG-MCG tablet     ondansetron (ZOFRAN ODT) 4 MG ODT tab     other medical supplies     oxyCODONE (ROXICODONE) 5 MG tablet     predniSONE (DELTASONE) 10 MG tablet     predniSONE (DELTASONE) 20 MG tablet     prochlorperazine (COMPAZINE) 10 MG tablet     topiramate (TOPAMAX) 25 MG  "tablet     vitamin B complex with vitamin C (VITAMIN  B COMPLEX) tablet     vitamin B-12 (CYANOCOBALAMIN) 2500 MCG sublingual tablet     Current Facility-Administered Medications   Medication     ondansetron (ZOFRAN ODT) ODT tab 4 mg     ROS- SEE HPI  /70 (BP Location: Right arm, Patient Position: Sitting, Cuff Size: Adult Large)   Pulse 79   Temp 97.4  F (36.3  C) (Temporal)   Ht 1.651 m (5' 5\")   Wt 90.7 kg (199 lb 15.3 oz)   LMP 10/22/2023 (Approximate)   SpO2 (!) 83%   BMI 33.27 kg/m      General - The patient is well nourished and well developed, and appears to have good nutritional status.  Alert and oriented to person and place, interactive.  Head and Face - Normocephalic and atraumatic, with no gross asymmetry noted of the contour of the facial features.  The facial nerve is intact, with strong symmetric movements.  Eyes - Extraocular movements intact.   Nose - Nasal mucosa is pink and moist with no abnormal mucus.  The septum was grossly midline, turbinates are without excess edema.  No polyps, masses, or purulence noted on examination.        To evaluate the nose and sinuses in the post operative state, I performed rigid nasal endoscopy. The nose was anesthetized with home afrin or topical lidocaine and neosynephrine in the office.     I began with the LEFT side using a 0 degree rigid nasal endoscope, and then similarly examined the RIGHT side     Findings:  Inferior turbinates:  Lateralized  Septum intact with  small synechiae to the right inferior turbinate  and controlled with silver nitrate.  I used a gold suction to remove normal postoperative secretions from the ethmoid cavity and maxillary antrostomies.   Middle turbinate and middle meatus:  No purulence, no polyposis, no synechiae  Antrostomy clear  Ethmoid cavity mild polypoid sinus degeneration.  The superior meatus and frontal recess are clear  The sphenoethmoid recess is clear  The nasopharynx is clear  Mucosa is " healthy throughout without polyps but polypoid degeneration present bilaterally along ethmoid cavity.     Impression/Plan      ICD-10-CM    1. Migraine without aura and without status migrainosus, not intractable  G43.009 rizatriptan (MAXALT-MLT) 5 MG ODT     Adult Neurology  Referral      2. S/P FESS (functional endoscopic sinus surgery)  Z98.890 predniSONE (DELTASONE) 20 MG tablet      3. Polypoid sinus degeneration  J33.1 predniSONE (DELTASONE) 20 MG tablet      4. Facial pain  R51.9             Follow up in Neurology.  Should mild improvement with Topamax but has since discontinued.  Trial of Maxalt for migraines.   Reassured sinuses nasal exam overall appears stable but does have mild polypoid sinus degeneration upon examination.  I do not feel that this accounted for her episode of facial pain.  Possible migraine variant.  Recommended prednisone to aid in polypoid degeneration and Maxalt was provided for as needed use.  Neurology referral was placed.  Continue with budesonide rinses.   Start Prednisone 20 mg daily for 5 days.   Follow up within 1 month.       Risks of oral steroid use were discussed and include psychiatric/mood changes, insomnia, stomach ulcers and potential GI bleeding, blood sugar elevation/worsening diabetes, hip/bone necrosis called avascular necrosis, or bone demineralization.      Kayleen Camacho PA-C  ENT  [facility], [place]      Again, thank you for allowing me to participate in the care of your patient.        Sincerely,        Kayleen Camacho PA-C

## 2023-12-05 NOTE — PROGRESS NOTES
Chief Complaint   Patient presents with    Surgical Followup     Post op 10/31 FESS/Septo/TR, worsening pain/symptoms       Patient returns ENT for concerns regarding worsening facial pain.  Patient was last seen for routine postoperative exam on 11/16/2023.  At that visit she tolerated debridement well.  She did have 1 small synechiae reduced and mometasone implants removed.  She was recommended to continue with rinses and Allegra Zyrtec.    Today, she has presents for concerns of facial pain. She describes a mild discomfort and felt worsening pain despite using rinses. Her pain was progressive and worse on Friday. She has felt increase in septal pain along her gum/ teeth. She reports the pain has not returned at this time.   She has been not been doing anything to cause the pain.   Intermittent pain about 4 days in a row.     She has been using rinses.   Bleeding from left side.   She is able to breath thru.   Denies fevers, nausea, emesis.     She had some improvement with Topamax but has since quit.   Imitrex did not tolerate.     Status post endoscopic sinus surgery on 10/31/23  Hx of chronic recurrent sinusitis with nasal polyps and perennial allergic rhinitis         serum specific IgE dated 6/15/2023 shows a class III and II positivity to dust mite, class I sensitivity to cat  Cats and dogs at home        Procedures 10/31:    1.  Bilateral frontal sinusotomy  2.  Bilateral total ethmoidectomy  3.  Bilateral sphenoidotomy  4.  Bilateral maxillary antrostomy with tissue removal    5.  Septoplasty  6.  Bilateral submucous reduction inferior turbinates  7.  Excision right roque bullosa  Sinus procedures performed with [employer] navigation     Findings: diffuse polyposis ethmoid cavity and frontal recess          Past Medical History:   Diagnosis Date    Multiple food allergies 7/1/2023    Wheat (low), scallops (low), shrimp (moderate), cow's milk (moderate), egg white (moderate).    Spondylolysis of lumbar  region     No Comments Provided        Allergies   Allergen Reactions    Azithromycin Other (See Comments)     Numbness in the right side, neurological changes.     Amoxicillin Nausea and Vomiting     Tolerates cephalexin    Cow's Milk [Milk (Cow)]     Doxycycline Nausea and Vomiting    Egg White (Egg Protein)     Prozac [Fluoxetine] Dizziness and Nausea and Vomiting    Rifampin Nausea and Vomiting    Shrimp      Also allergic to scallops    Wheat Extract     Hydrocodone Nausea     Vomiting    Seasonal Allergies Other (See Comments)     Dust, pollen    Wheat Germ Oil Other (See Comments)     Dust, pollen     Current Outpatient Medications   Medication    acetaminophen (TYLENOL) 325 MG tablet    albuterol (PROAIR HFA/PROVENTIL HFA/VENTOLIN HFA) 108 (90 Base) MCG/ACT inhaler    budesonide (PULMICORT) 0.5 MG/2ML neb solution    cetirizine (ZYRTEC) 10 MG tablet    dexAMETHasone (DECADRON) 1 MG tablet    EPINEPHrine (ANY BX GENERIC EQUIV) 0.3 MG/0.3ML injection 2-pack    escitalopram (LEXAPRO) 10 MG tablet    escitalopram (LEXAPRO) 10 MG tablet    fexofenadine (ALLEGRA) 180 MG tablet    ipratropium - albuterol 0.5 mg/2.5 mg/3 mL (DUONEB) 0.5-2.5 (3) MG/3ML neb solution    lisinopril (ZESTRIL) 20 MG tablet    metoclopramide (REGLAN) 10 MG tablet    mometasone (NASONEX) 50 MCG/ACT nasal spray    montelukast (SINGULAIR) 10 MG tablet    norgestrel-ethinyl estradiol (LO/OVRAL) 0.3-30 MG-MCG tablet    ondansetron (ZOFRAN ODT) 4 MG ODT tab    other medical supplies    oxyCODONE (ROXICODONE) 5 MG tablet    predniSONE (DELTASONE) 10 MG tablet    predniSONE (DELTASONE) 20 MG tablet    prochlorperazine (COMPAZINE) 10 MG tablet    topiramate (TOPAMAX) 25 MG tablet    vitamin B complex with vitamin C (VITAMIN  B COMPLEX) tablet    vitamin B-12 (CYANOCOBALAMIN) 2500 MCG sublingual tablet     Current Facility-Administered Medications   Medication    ondansetron (ZOFRAN ODT) ODT tab 4 mg     ROS- SEE HPI  /70 (BP Location: Right  "arm, Patient Position: Sitting, Cuff Size: Adult Large)   Pulse 79   Temp 97.4  F (36.3  C) (Temporal)   Ht 1.651 m (5' 5\")   Wt 90.7 kg (199 lb 15.3 oz)   LMP 10/22/2023 (Approximate)   SpO2 (!) 83%   BMI 33.27 kg/m      General - The patient is well nourished and well developed, and appears to have good nutritional status.  Alert and oriented to person and place, interactive.  Head and Face - Normocephalic and atraumatic, with no gross asymmetry noted of the contour of the facial features.  The facial nerve is intact, with strong symmetric movements.  Eyes - Extraocular movements intact.   Nose - Nasal mucosa is pink and moist with no abnormal mucus.  The septum was grossly midline, turbinates are without excess edema.  No polyps, masses, or purulence noted on examination.        To evaluate the nose and sinuses in the post operative state, I performed rigid nasal endoscopy. The nose was anesthetized with home afrin or topical lidocaine and neosynephrine in the office.     I began with the LEFT side using a 0 degree rigid nasal endoscope, and then similarly examined the RIGHT side     Findings:  Inferior turbinates:  Lateralized  Septum intact with  small synechiae to the right inferior turbinate  and controlled with silver nitrate.  I used a gold suction to remove normal postoperative secretions from the ethmoid cavity and maxillary antrostomies.   Middle turbinate and middle meatus:  No purulence, no polyposis, no synechiae  Antrostomy clear  Ethmoid cavity mild polypoid sinus degeneration.  The superior meatus and frontal recess are clear  The sphenoethmoid recess is clear  The nasopharynx is clear  Mucosa is healthy throughout without polyps but polypoid degeneration present bilaterally along ethmoid cavity.     Impression/Plan      ICD-10-CM    1. Migraine without aura and without status migrainosus, not intractable  G43.009 rizatriptan (MAXALT-MLT) 5 MG ODT     Adult Neurology  " Referral      2. S/P FESS (functional endoscopic sinus surgery)  Z98.890 predniSONE (DELTASONE) 20 MG tablet      3. Polypoid sinus degeneration  J33.1 predniSONE (DELTASONE) 20 MG tablet      4. Facial pain  R51.9             Follow up in Neurology.  Should mild improvement with Topamax but has since discontinued.  Trial of Maxalt for migraines.   Reassured sinuses nasal exam overall appears stable but does have mild polypoid sinus degeneration upon examination.  I do not feel that this accounted for her episode of facial pain.  Possible migraine variant.  Recommended prednisone to aid in polypoid degeneration and Maxalt was provided for as needed use.  Neurology referral was placed.  Continue with budesonide rinses.   Start Prednisone 20 mg daily for 5 days.   Follow up within 1 month.       Risks of oral steroid use were discussed and include psychiatric/mood changes, insomnia, stomach ulcers and potential GI bleeding, blood sugar elevation/worsening diabetes, hip/bone necrosis called avascular necrosis, or bone demineralization.      Kayleen Camacho PA-C  ENT  [facility], [place]

## 2023-12-07 ENCOUNTER — LAB (OUTPATIENT)
Dept: LAB | Facility: OTHER | Age: 23
End: 2023-12-07
Attending: FAMILY MEDICINE
Payer: COMMERCIAL

## 2023-12-07 DIAGNOSIS — R79.89 ELEVATED CORTISOL LEVEL: ICD-10-CM

## 2023-12-07 LAB
CORTIS 8H P 1 MG DEX SERPL-MCNC: 1 UG/DL
PROLACTIN SERPL 3RD IS-MCNC: 18 NG/ML (ref 5–23)
TSH SERPL DL<=0.005 MIU/L-ACNC: 0.62 UIU/ML (ref 0.3–4.2)

## 2023-12-07 PROCEDURE — 82627 DEHYDROEPIANDROSTERONE: CPT | Mod: ZL

## 2023-12-07 PROCEDURE — 36415 COLL VENOUS BLD VENIPUNCTURE: CPT | Mod: ZL

## 2023-12-07 PROCEDURE — 82533 TOTAL CORTISOL: CPT | Mod: ZL

## 2023-12-07 PROCEDURE — 84305 ASSAY OF SOMATOMEDIN: CPT | Mod: ZL

## 2023-12-07 PROCEDURE — 84999 UNLISTED CHEMISTRY PROCEDURE: CPT | Mod: ZL

## 2023-12-07 PROCEDURE — 82088 ASSAY OF ALDOSTERONE: CPT | Mod: ZL

## 2023-12-07 PROCEDURE — 84244 ASSAY OF RENIN: CPT | Mod: ZL

## 2023-12-07 PROCEDURE — 80299 QUANTITATIVE ASSAY DRUG: CPT | Mod: ZL

## 2023-12-07 PROCEDURE — 82024 ASSAY OF ACTH: CPT | Mod: ZL

## 2023-12-07 PROCEDURE — 84146 ASSAY OF PROLACTIN: CPT | Mod: ZL

## 2023-12-07 PROCEDURE — 84443 ASSAY THYROID STIM HORMONE: CPT | Mod: ZL

## 2023-12-07 PROCEDURE — 83835 ASSAY OF METANEPHRINES: CPT | Mod: ZL

## 2023-12-08 LAB
ACTH PLAS-MCNC: <10 PG/ML
ALDOST SERPL-MCNC: <3 NG/DL (ref 0–31)
DHEA-S SERPL-MCNC: 153 UG/DL (ref 35–430)
IGF-I BLD-MCNC: 281 NG/ML (ref 107–351)

## 2023-12-11 LAB
ANNOTATION COMMENT IMP: NORMAL
Lab: NORMAL
METANEPHS SERPL-SCNC: 0.14 NMOL/L
NORMETANEPHRINE SERPL-SCNC: 0.14 NMOL/L
PERFORMING LABORATORY: NORMAL
RENIN PLAS-CCNC: 55.6 NG/ML/HR
SPECIMEN STATUS: NORMAL
TEST NAME: NORMAL

## 2023-12-14 DIAGNOSIS — R09.81 CONGESTION OF PARANASAL SINUS: ICD-10-CM

## 2023-12-14 LAB — MISCELLANEOUS TEST 1 (ARUP): NORMAL

## 2023-12-15 RX ORDER — MONTELUKAST SODIUM 10 MG/1
1 TABLET ORAL AT BEDTIME
Qty: 90 TABLET | Refills: 0 | Status: SHIPPED | OUTPATIENT
Start: 2023-12-15 | End: 2024-03-15

## 2023-12-15 NOTE — TELEPHONE ENCOUNTER
[facility] in #[id number] in [facility] [place] sent Rx request for the following:      Requested Prescriptions   Pending Prescriptions Disp Refills    montelukast (SINGULAIR) 10 MG tablet [Pharmacy Med Name: MONTELUKAST SOD 10 MG TABLET] 90 tablet 1     Sig: TAKE 1 TABLET BY MOUTH EVERYDAY AT BEDTIME       Leukotriene Inhibitors Protocol Failed - 12/15/2023  3:05 PM        Failed - Asthma control assessment score within normal limits in last 6 months     Please review ACT score.         Failed - Recent (6 mo) or future (30 days) visit within the authorizing provider's specialty     Last Prescription Date:   6/22/23  Last Fill Qty/Refills:         90, R-1    Last Office Visit:              10/26/23   Future Office visit:           None    Unable to complete prescription refill per RN Medication Refill Policy.     Pamela Sands RN .............. 12/15/2023  3:06 PM

## 2023-12-16 ENCOUNTER — HEALTH MAINTENANCE LETTER (OUTPATIENT)
Age: 23
End: 2023-12-16

## 2023-12-22 DIAGNOSIS — Z30.011 ENCOUNTER FOR INITIAL PRESCRIPTION OF CONTRACEPTIVE PILLS: ICD-10-CM

## 2023-12-26 RX ORDER — NORGESTREL AND ETHINYL ESTRADIOL 0.3-0.03MG
1 KIT ORAL DAILY
Qty: 84 TABLET | Refills: 4 | Status: SHIPPED | OUTPATIENT
Start: 2023-12-26 | End: 2024-04-23

## 2024-01-02 ENCOUNTER — HOSPITAL ENCOUNTER (EMERGENCY)
Facility: OTHER | Age: 24
Discharge: HOME | End: 2024-01-02
Attending: EMERGENCY MEDICINE | Admitting: EMERGENCY MEDICINE
Payer: COMMERCIAL

## 2024-01-02 VITALS
TEMPERATURE: 98.1 F | DIASTOLIC BLOOD PRESSURE: 72 MMHG | RESPIRATION RATE: 20 BRPM | OXYGEN SATURATION: 99 % | HEART RATE: 94 BPM | SYSTOLIC BLOOD PRESSURE: 117 MMHG

## 2024-01-02 DIAGNOSIS — R19.7 VOMITING AND DIARRHEA: ICD-10-CM

## 2024-01-02 DIAGNOSIS — R11.10 VOMITING AND DIARRHEA: ICD-10-CM

## 2024-01-02 LAB
ALBUMIN SERPL BCG-MCNC: 4.2 G/DL (ref 3.5–5.2)
ALBUMIN UR-MCNC: 10 MG/DL
ALP SERPL-CCNC: 96 U/L (ref 40–150)
ALT SERPL W P-5'-P-CCNC: 26 U/L (ref 0–50)
ANION GAP SERPL CALCULATED.3IONS-SCNC: 13 MMOL/L (ref 7–15)
APPEARANCE UR: CLEAR
AST SERPL W P-5'-P-CCNC: 20 U/L (ref 0–45)
BACTERIA #/AREA URNS HPF: ABNORMAL /HPF
BASOPHILS # BLD AUTO: 0 10E3/UL (ref 0–0.2)
BASOPHILS NFR BLD AUTO: 0 %
BILIRUB SERPL-MCNC: 0.3 MG/DL
BILIRUB UR QL STRIP: NEGATIVE
BUN SERPL-MCNC: 11.3 MG/DL (ref 6–20)
CALCIUM SERPL-MCNC: 9.2 MG/DL (ref 8.6–10)
CHLORIDE SERPL-SCNC: 100 MMOL/L (ref 98–107)
COLOR UR AUTO: YELLOW
CREAT SERPL-MCNC: 0.62 MG/DL (ref 0.51–0.95)
DEPRECATED HCO3 PLAS-SCNC: 23 MMOL/L (ref 22–29)
EGFRCR SERPLBLD CKD-EPI 2021: >90 ML/MIN/1.73M2
EOSINOPHIL # BLD AUTO: 0.1 10E3/UL (ref 0–0.7)
EOSINOPHIL NFR BLD AUTO: 1 %
ERYTHROCYTE [DISTWIDTH] IN BLOOD BY AUTOMATED COUNT: 12.4 % (ref 10–15)
GLUCOSE SERPL-MCNC: 122 MG/DL (ref 70–99)
GLUCOSE UR STRIP-MCNC: NEGATIVE MG/DL
HCG UR QL: NEGATIVE
HCT VFR BLD AUTO: 38.7 % (ref 35–47)
HGB BLD-MCNC: 13.7 G/DL (ref 11.7–15.7)
HGB UR QL STRIP: NEGATIVE
HOLD SPECIMEN: NORMAL
IMM GRANULOCYTES # BLD: 0 10E3/UL
IMM GRANULOCYTES NFR BLD: 0 %
KETONES UR STRIP-MCNC: NEGATIVE MG/DL
LACTATE SERPL-SCNC: 1.4 MMOL/L (ref 0.7–2)
LEUKOCYTE ESTERASE UR QL STRIP: NEGATIVE
LIPASE SERPL-CCNC: 19 U/L (ref 13–60)
LYMPHOCYTES # BLD AUTO: 2.4 10E3/UL (ref 0.8–5.3)
LYMPHOCYTES NFR BLD AUTO: 20 %
MCH RBC QN AUTO: 30.8 PG (ref 26.5–33)
MCHC RBC AUTO-ENTMCNC: 35.4 G/DL (ref 31.5–36.5)
MCV RBC AUTO: 87 FL (ref 78–100)
MONOCYTES # BLD AUTO: 1 10E3/UL (ref 0–1.3)
MONOCYTES NFR BLD AUTO: 9 %
MUCOUS THREADS #/AREA URNS LPF: PRESENT /LPF
NEUTROPHILS # BLD AUTO: 7.9 10E3/UL (ref 1.6–8.3)
NEUTROPHILS NFR BLD AUTO: 70 %
NITRATE UR QL: NEGATIVE
NRBC # BLD AUTO: 0 10E3/UL
NRBC BLD AUTO-RTO: 0 /100
PH UR STRIP: 5.5 [PH] (ref 5–9)
PLATELET # BLD AUTO: 343 10E3/UL (ref 150–450)
POTASSIUM SERPL-SCNC: 3.8 MMOL/L (ref 3.4–5.3)
PROT SERPL-MCNC: 7.7 G/DL (ref 6.4–8.3)
RBC # BLD AUTO: 4.45 10E6/UL (ref 3.8–5.2)
RBC URINE: 2 /HPF
SODIUM SERPL-SCNC: 136 MMOL/L (ref 135–145)
SP GR UR STRIP: 1.03 (ref 1–1.03)
UROBILINOGEN UR STRIP-MCNC: NORMAL MG/DL
WBC # BLD AUTO: 11.5 10E3/UL (ref 4–11)
WBC URINE: 5 /HPF

## 2024-01-02 PROCEDURE — 36415 COLL VENOUS BLD VENIPUNCTURE: CPT | Performed by: EMERGENCY MEDICINE

## 2024-01-02 PROCEDURE — 250N000011 HC RX IP 250 OP 636: Performed by: EMERGENCY MEDICINE

## 2024-01-02 PROCEDURE — 81025 URINE PREGNANCY TEST: CPT | Performed by: EMERGENCY MEDICINE

## 2024-01-02 PROCEDURE — 81001 URINALYSIS AUTO W/SCOPE: CPT | Performed by: EMERGENCY MEDICINE

## 2024-01-02 PROCEDURE — 85025 COMPLETE CBC W/AUTO DIFF WBC: CPT | Performed by: EMERGENCY MEDICINE

## 2024-01-02 PROCEDURE — 96375 TX/PRO/DX INJ NEW DRUG ADDON: CPT | Performed by: EMERGENCY MEDICINE

## 2024-01-02 PROCEDURE — 83690 ASSAY OF LIPASE: CPT | Performed by: EMERGENCY MEDICINE

## 2024-01-02 PROCEDURE — 83605 ASSAY OF LACTIC ACID: CPT | Performed by: EMERGENCY MEDICINE

## 2024-01-02 PROCEDURE — 99284 EMERGENCY DEPT VISIT MOD MDM: CPT | Mod: 25 | Performed by: EMERGENCY MEDICINE

## 2024-01-02 PROCEDURE — 99283 EMERGENCY DEPT VISIT LOW MDM: CPT | Performed by: EMERGENCY MEDICINE

## 2024-01-02 PROCEDURE — 80053 COMPREHEN METABOLIC PANEL: CPT | Performed by: EMERGENCY MEDICINE

## 2024-01-02 PROCEDURE — 96374 THER/PROPH/DIAG INJ IV PUSH: CPT | Performed by: EMERGENCY MEDICINE

## 2024-01-02 RX ORDER — ONDANSETRON 2 MG/ML
4 INJECTION INTRAMUSCULAR; INTRAVENOUS ONCE
Status: COMPLETED | OUTPATIENT
Start: 2024-01-02 | End: 2024-01-02

## 2024-01-02 RX ORDER — KETOROLAC TROMETHAMINE 30 MG/ML
30 INJECTION, SOLUTION INTRAMUSCULAR; INTRAVENOUS ONCE
Status: COMPLETED | OUTPATIENT
Start: 2024-01-02 | End: 2024-01-02

## 2024-01-02 RX ORDER — METOCLOPRAMIDE HYDROCHLORIDE 5 MG/ML
10 INJECTION INTRAMUSCULAR; INTRAVENOUS ONCE
Status: COMPLETED | OUTPATIENT
Start: 2024-01-02 | End: 2024-01-02

## 2024-01-02 RX ADMIN — ONDANSETRON 4 MG: 2 INJECTION INTRAMUSCULAR; INTRAVENOUS at 04:00

## 2024-01-02 RX ADMIN — KETOROLAC TROMETHAMINE 30 MG: 30 INJECTION, SOLUTION INTRAMUSCULAR; INTRAVENOUS at 04:33

## 2024-01-02 RX ADMIN — METOCLOPRAMIDE 10 MG: 5 INJECTION, SOLUTION INTRAMUSCULAR; INTRAVENOUS at 04:36

## 2024-01-02 ASSESSMENT — ENCOUNTER SYMPTOMS
ABDOMINAL PAIN: 1
NAUSEA: 0
CONSTITUTIONAL NEGATIVE: 1
RESPIRATORY NEGATIVE: 1
ENDOCRINE NEGATIVE: 1
MYALGIAS: 0
NEUROLOGICAL NEGATIVE: 1
NECK PAIN: 0
VOMITING: 0
PSYCHIATRIC NEGATIVE: 1
NECK STIFFNESS: 0
EYES NEGATIVE: 1
ALLERGIC/IMMUNOLOGIC NEGATIVE: 1
HEMATOLOGIC/LYMPHATIC NEGATIVE: 1
CARDIOVASCULAR NEGATIVE: 1
MUSCULOSKELETAL NEGATIVE: 1

## 2024-01-02 ASSESSMENT — ACTIVITIES OF DAILY LIVING (ADL): ADLS_ACUITY_SCORE: 35

## 2024-01-02 NOTE — ED TRIAGE NOTES
Pt arrives via private car with complaints of severe abd cramping associated with N/V/D. Symptoms started around 2300. Pt states she believes its from chicken she ate earlier in the day. Denies fever     Triage Assessment (Adult)       Row Name 01/02/24 0333          Triage Assessment    Airway WDL WDL        Respiratory WDL    Respiratory WDL WDL        Skin Circulation/Temperature WDL    Skin Circulation/Temperature WDL WDL        Cardiac WDL    Cardiac WDL WDL        Peripheral/Neurovascular WDL    Peripheral Neurovascular WDL WDL        Cognitive/Neuro/Behavioral WDL    Cognitive/Neuro/Behavioral WDL WDL

## 2024-01-02 NOTE — DISCHARGE INSTRUCTIONS
1) Follow the aftercare instructions provided  2) If you develop any new or worsening symptoms including a fever or chest pain or shortness of breath return to the ER immediately  3) Follow up with your doctor this week.

## 2024-01-02 NOTE — ED PROVIDER NOTES
History     Chief Complaint   Patient presents with    Nausea, Vomiting, & Diarrhea    Abdominal Pain     HPI  Lyn Ny is a 23 year old female who presents today with complaints of nausea, vomiting and abdominal cramps and diarrhea.  Vomiting is nonbilious and diarrhea is nonbloody symptoms began acutely about 10 PM the night prior.  No ill contacts or recent travel.  Patient has otherwise been at baseline state of health.    Allergies:  Allergies   Allergen Reactions    Azithromycin Other (See Comments)     Numbness in the right side, neurological changes.     Amoxicillin Nausea and Vomiting     Tolerates cephalexin    Cow's Milk [Milk (Cow)]     Doxycycline Nausea and Vomiting    Egg White (Egg Protein)     Prozac [Fluoxetine] Dizziness and Nausea and Vomiting    Rifampin Nausea and Vomiting    Shrimp      Also allergic to scallops    Wheat Extract     Hydrocodone Nausea     Vomiting    Seasonal Allergies Other (See Comments)     Dust, pollen    Wheat Germ Oil Other (See Comments)     Dust, pollen       Problem List:    Patient Active Problem List    Diagnosis Date Noted    Anxiety and depression 07/28/2023     Priority: Medium    Multiple food allergies 07/01/2023     Priority: Medium     Wheat (low), scallops (low), shrimp (moderate), cow's milk (moderate), egg white (moderate).      Migraine without aura and without status migrainosus, not intractable 06/22/2023     Priority: Medium    Congestion of paranasal sinus 06/22/2023     Priority: Medium    Mild persistent asthma without complication 10/24/2022     Priority: Medium    Acne 02/10/2018     Priority: Medium    Menorrhagia with irregular cycle 02/10/2018     Priority: Medium    Spondylolysis of lumbar region 07/17/2017     Priority: Medium        Past Medical History:    Past Medical History:   Diagnosis Date    Multiple food allergies 7/1/2023    Spondylolysis of lumbar region        Past Surgical History:    Past Surgical History:   Procedure  Laterality Date    ENDOSCOPIC SINUS SURGERY N/A 10/31/2023    Procedure: Endoscopic Sinus Surgery;  Surgeon: Lalita Hewitt MD;  Location: [facility]    Lumbar fusion of L5  8/2/3017    subsequent infection    SEPTOPLASTY, TURBINOPLASTY, COMBINED Bilateral 10/31/2023    Procedure: Septoplasty, Turbinate Reduction, Excision Right Vicenta Bullosa;  Surgeon: Lalita Hewitt MD;  Location: [facility] OR       Family History:    Family History   Problem Relation Age of Onset    Hypertension Mother         Hypertension,Borderline    Hypertension Father         Hypertension    Hyperlipidemia Father         Hyperlipidemia    Hypertension Maternal Grandmother         Hypertension    Hypertension Maternal Grandfather         Hypertension    Hypertension Paternal Grandfather         Hypertension    Heart Disease Paternal Grandfather         Heart Disease,smoker       Social History:  Marital Status:  Single [1]  Social History     Tobacco Use    Smoking status: Never     Passive exposure: Never    Smokeless tobacco: Never    Tobacco comments:     Quit smoking: Mom smokes outside   Vaping Use    Vaping Use: Never used   Substance Use Topics    Alcohol use: No    Drug use: Never     Comment: Drug use: No        Medications:    acetaminophen (TYLENOL) 325 MG tablet  albuterol (PROAIR HFA/PROVENTIL HFA/VENTOLIN HFA) 108 (90 Base) MCG/ACT inhaler  budesonide (PULMICORT) 0.5 MG/2ML neb solution  cetirizine (ZYRTEC) 10 MG tablet  dexAMETHasone (DECADRON) 1 MG tablet  EPINEPHrine (ANY BX GENERIC EQUIV) 0.3 MG/0.3ML injection 2-pack  escitalopram (LEXAPRO) 10 MG tablet  escitalopram (LEXAPRO) 10 MG tablet  fexofenadine (ALLEGRA) 180 MG tablet  ipratropium - albuterol 0.5 mg/2.5 mg/3 mL (DUONEB) 0.5-2.5 (3) MG/3ML neb solution  lisinopril (ZESTRIL) 20 MG tablet  LOW-OGESTREL 0.3-30 MG-MCG tablet  metoclopramide (REGLAN) 10 MG tablet  mometasone (NASONEX) 50 MCG/ACT nasal spray  montelukast (SINGULAIR) 10 MG tablet  ondansetron (ZOFRAN  ODT) 4 MG ODT tab  other medical supplies  oxyCODONE (ROXICODONE) 5 MG tablet  predniSONE (DELTASONE) 10 MG tablet  predniSONE (DELTASONE) 20 MG tablet  prochlorperazine (COMPAZINE) 10 MG tablet  rizatriptan (MAXALT-MLT) 5 MG ODT  topiramate (TOPAMAX) 25 MG tablet  vitamin B complex with vitamin C (VITAMIN  B COMPLEX) tablet  vitamin B-12 (CYANOCOBALAMIN) 2500 MCG sublingual tablet          Review of Systems   Constitutional: Negative.    HENT: Negative.     Eyes: Negative.    Respiratory: Negative.     Cardiovascular: Negative.    Gastrointestinal:  Positive for abdominal pain. Negative for nausea and vomiting.   Endocrine: Negative.    Genitourinary: Negative.    Musculoskeletal: Negative.  Negative for myalgias, neck pain and neck stiffness.   Skin: Negative.    Allergic/Immunologic: Negative.    Neurological: Negative.    Hematological: Negative.    Psychiatric/Behavioral: Negative.         Physical Exam   Pulse: 103  Temp: 97  F (36.1  C)  Resp: 20      Physical Exam  Constitutional:       General: She is not in acute distress.     Appearance: She is well-developed. She is not toxic-appearing.   HENT:      Head: Normocephalic and atraumatic.   Eyes:      Extraocular Movements: Extraocular movements intact.   Cardiovascular:      Rate and Rhythm: Normal rate and regular rhythm.   Abdominal:      General: Abdomen is scaphoid and protuberant. There is no distension. There are no signs of injury.      Palpations: Abdomen is soft.      Tenderness: There is abdominal tenderness in the epigastric area.   Skin:     General: Skin is warm and dry.      Capillary Refill: Capillary refill takes less than 2 seconds.   Neurological:      General: No focal deficit present.      Mental Status: She is alert.   Psychiatric:         Mood and Affect: Mood normal.         Behavior: Behavior normal.         ED Course              ED Course as of 01/02/24 0611   Tue Jan 02, 2024   6530 Patient feeling better.  Tolerating p.o.'s.  No  abdominal pain.  Plan discharge home.  Supportive care measures for now     Procedures         Results for orders placed or performed during the hospital encounter of 01/02/24 (from the past 24 hour(s))   [place] Draw    Narrative    The following orders were created for panel order [place] Draw.  Procedure                               Abnormality         Status                     ---------                               -----------         ------                     Extra Blue Top Tube[[id number]]                              Final result               Extra Red Top Tube[[id number]]                               Final result               Extra Green Top (Lithium...[[id number]]                      Final result               Extra Purple Top Tube[[id number]]                            Final result               Extra Green Top (Lithium...[[id number]]                      Final result                 Please view results for these tests on the individual orders.   Extra Blue Top Tube   Result Value Ref Range    Hold Specimen JIC    Extra Red Top Tube   Result Value Ref Range    Hold Specimen JIC    Extra Green Top (Lithium Heparin) Tube   Result Value Ref Range    Hold Specimen JIC    Extra Purple Top Tube   Result Value Ref Range    Hold Specimen JIC    Extra Green Top (Lithium Heparin) ON ICE   Result Value Ref Range    Hold Specimen JIC    CBC with platelets differential    Narrative    The following orders were created for panel order CBC with platelets differential.  Procedure                               Abnormality         Status                     ---------                               -----------         ------                     CBC with platelets and d...[[id number]]  Abnormal            Final result                 Please view results for these tests on the individual orders.   Comprehensive metabolic panel   Result Value Ref Range    Sodium 136 135 - 145 mmol/L    Potassium 3.8 3.4 - 5.3 mmol/L    Carbon  Dioxide (CO2) 23 22 - 29 mmol/L    Anion Gap 13 7 - 15 mmol/L    Urea Nitrogen 11.3 6.0 - 20.0 mg/dL    Creatinine 0.62 0.51 - 0.95 mg/dL    GFR Estimate >90 >60 mL/min/1.73m2    Calcium 9.2 8.6 - 10.0 mg/dL    Chloride 100 98 - 107 mmol/L    Glucose 122 (H) 70 - 99 mg/dL    Alkaline Phosphatase 96 40 - 150 U/L    AST 20 0 - 45 U/L    ALT 26 0 - 50 U/L    Protein Total 7.7 6.4 - 8.3 g/dL    Albumin 4.2 3.5 - 5.2 g/dL    Bilirubin Total 0.3 <=1.2 mg/dL   Lipase   Result Value Ref Range    Lipase 19 13 - 60 U/L   Lactic acid whole blood   Result Value Ref Range    Lactic Acid 1.4 0.7 - 2.0 mmol/L   CBC with platelets and differential   Result Value Ref Range    WBC Count 11.5 (H) 4.0 - 11.0 10e3/uL    RBC Count 4.45 3.80 - 5.20 10e6/uL    Hemoglobin 13.7 11.7 - 15.7 g/dL    Hematocrit 38.7 35.0 - 47.0 %    MCV 87 78 - 100 fL    MCH 30.8 26.5 - 33.0 pg    MCHC 35.4 31.5 - 36.5 g/dL    RDW 12.4 10.0 - 15.0 %    Platelet Count 343 150 - 450 10e3/uL    % Neutrophils 70 %    % Lymphocytes 20 %    % Monocytes 9 %    % Eosinophils 1 %    % Basophils 0 %    % Immature Granulocytes 0 %    NRBCs per 100 WBC 0 <1 /100    Absolute Neutrophils 7.9 1.6 - 8.3 10e3/uL    Absolute Lymphocytes 2.4 0.8 - 5.3 10e3/uL    Absolute Monocytes 1.0 0.0 - 1.3 10e3/uL    Absolute Eosinophils 0.1 0.0 - 0.7 10e3/uL    Absolute Basophils 0.0 0.0 - 0.2 10e3/uL    Absolute Immature Granulocytes 0.0 <=0.4 10e3/uL    Absolute NRBCs 0.0 10e3/uL   HCG qualitative urine   Result Value Ref Range    hCG Urine Qualitative Negative Negative   UA with Microscopic reflex to Culture    Specimen: Urine, Midstream   Result Value Ref Range    Color Urine Yellow Colorless, Straw, Light Yellow, Yellow    Appearance Urine Clear Clear    Glucose Urine Negative Negative mg/dL    Bilirubin Urine Negative Negative    Ketones Urine Negative Negative mg/dL    Specific Gravity Urine 1.031 (H) 1.000 - 1.030    Blood Urine Negative Negative    pH Urine 5.5 5.0 - 9.0    Protein  Albumin Urine 10 (A) Negative mg/dL    Urobilinogen Urine Normal Normal, 2.0 mg/dL    Nitrite Urine Negative Negative    Leukocyte Esterase Urine Negative Negative    Bacteria Urine Few (A) None Seen /HPF    Mucus Urine Present (A) None Seen /LPF    RBC Urine 2 <=2 /HPF    WBC Urine 5 <=5 /HPF    Narrative    Urine Culture not indicated       Medications   ketorolac (TORADOL) injection 30 mg (has no administration in time range)   metoclopramide (REGLAN) injection 10 mg (has no administration in time range)   ondansetron (ZOFRAN) injection 4 mg (4 mg Intravenous $Given 1/2/24 0400)       Assessments & Plan (with Medical Decision Making)     Well-appearing 23-year-old who had acute onset of nausea, vomiting and diarrhea.  Treated supportively here in the emergency department with Zofran as well as IV fluids.  Also given Toradol and feeling much better.      Suspect viral gastroenteritis.  Abdominal exams unremarkable.  Diagnostic yield of CT appears of low yield given largely unremarkable labs and serial abdominal exams-actually showed no tenderness.      Patient understands to drink plenty of fluids and stick to a brat diet for now.  Understands to return if she develops any new or worsening symptoms.    New Prescriptions    No medications on file       Final diagnoses:   Vomiting and diarrhea       1/2/2024   [facility] AND [facility]       Suri Montes De Oca MD  01/02/24 9966

## 2024-02-07 ENCOUNTER — APPOINTMENT (OUTPATIENT)
Dept: GENERAL RADIOLOGY | Facility: OTHER | Age: 24
End: 2024-02-07
Attending: FAMILY MEDICINE
Payer: COMMERCIAL

## 2024-02-07 ENCOUNTER — HOSPITAL ENCOUNTER (EMERGENCY)
Facility: OTHER | Age: 24
Discharge: HOME | End: 2024-02-07
Attending: PHYSICIAN ASSISTANT | Admitting: FAMILY MEDICINE
Payer: COMMERCIAL

## 2024-02-07 VITALS
TEMPERATURE: 96.4 F | SYSTOLIC BLOOD PRESSURE: 129 MMHG | DIASTOLIC BLOOD PRESSURE: 82 MMHG | HEART RATE: 85 BPM | RESPIRATION RATE: 18 BRPM | OXYGEN SATURATION: 98 % | BODY MASS INDEX: 33.12 KG/M2 | WEIGHT: 199 LBS

## 2024-02-07 DIAGNOSIS — R19.7 NAUSEA VOMITING AND DIARRHEA: ICD-10-CM

## 2024-02-07 DIAGNOSIS — R11.2 NAUSEA VOMITING AND DIARRHEA: ICD-10-CM

## 2024-02-07 DIAGNOSIS — R10.9 ABDOMINAL CRAMPING: ICD-10-CM

## 2024-02-07 LAB
ALBUMIN SERPL BCG-MCNC: 4.1 G/DL (ref 3.5–5.2)
ALBUMIN UR-MCNC: 50 MG/DL
ALP SERPL-CCNC: 101 U/L (ref 40–150)
ALT SERPL W P-5'-P-CCNC: 13 U/L (ref 0–50)
ANION GAP SERPL CALCULATED.3IONS-SCNC: 17 MMOL/L (ref 7–15)
APPEARANCE UR: CLEAR
AST SERPL W P-5'-P-CCNC: 24 U/L (ref 0–45)
BACTERIA #/AREA URNS HPF: ABNORMAL /HPF
BASOPHILS # BLD AUTO: 0.1 10E3/UL (ref 0–0.2)
BASOPHILS NFR BLD AUTO: 0 %
BILIRUB SERPL-MCNC: 0.3 MG/DL
BILIRUB UR QL STRIP: NEGATIVE
BUN SERPL-MCNC: 10.1 MG/DL (ref 6–20)
CALCIUM SERPL-MCNC: 9.4 MG/DL (ref 8.6–10)
CHLORIDE SERPL-SCNC: 103 MMOL/L (ref 98–107)
COLOR UR AUTO: YELLOW
CREAT SERPL-MCNC: 0.68 MG/DL (ref 0.51–0.95)
CRP SERPL-MCNC: 14.14 MG/L
DEPRECATED HCO3 PLAS-SCNC: 17 MMOL/L (ref 22–29)
EGFRCR SERPLBLD CKD-EPI 2021: >90 ML/MIN/1.73M2
EOSINOPHIL # BLD AUTO: 0.1 10E3/UL (ref 0–0.7)
EOSINOPHIL NFR BLD AUTO: 1 %
ERYTHROCYTE [DISTWIDTH] IN BLOOD BY AUTOMATED COUNT: 11.9 % (ref 10–15)
GLUCOSE SERPL-MCNC: 96 MG/DL (ref 70–99)
GLUCOSE UR STRIP-MCNC: NEGATIVE MG/DL
HCG UR QL: NEGATIVE
HCT VFR BLD AUTO: 45.7 % (ref 35–47)
HGB BLD-MCNC: 16.3 G/DL (ref 11.7–15.7)
HGB UR QL STRIP: NEGATIVE
HOLD SPECIMEN: NORMAL
HYALINE CASTS: 1 /LPF
IMM GRANULOCYTES # BLD: 0.1 10E3/UL
IMM GRANULOCYTES NFR BLD: 0 %
KETONES UR STRIP-MCNC: 100 MG/DL
LEUKOCYTE ESTERASE UR QL STRIP: NEGATIVE
LYMPHOCYTES # BLD AUTO: 1.7 10E3/UL (ref 0.8–5.3)
LYMPHOCYTES NFR BLD AUTO: 9 %
MCH RBC QN AUTO: 30.5 PG (ref 26.5–33)
MCHC RBC AUTO-ENTMCNC: 35.7 G/DL (ref 31.5–36.5)
MCV RBC AUTO: 85 FL (ref 78–100)
MONOCYTES # BLD AUTO: 0.8 10E3/UL (ref 0–1.3)
MONOCYTES NFR BLD AUTO: 4 %
MUCOUS THREADS #/AREA URNS LPF: PRESENT /LPF
NEUTROPHILS # BLD AUTO: 16.7 10E3/UL (ref 1.6–8.3)
NEUTROPHILS NFR BLD AUTO: 86 %
NITRATE UR QL: NEGATIVE
NRBC # BLD AUTO: 0 10E3/UL
NRBC BLD AUTO-RTO: 0 /100
PH UR STRIP: 5.5 [PH] (ref 5–9)
PLATELET # BLD AUTO: 408 10E3/UL (ref 150–450)
POTASSIUM SERPL-SCNC: 5 MMOL/L (ref 3.4–5.3)
PROCALCITONIN SERPL IA-MCNC: 0.04 NG/ML
PROT SERPL-MCNC: 8 G/DL (ref 6.4–8.3)
RBC # BLD AUTO: 5.35 10E6/UL (ref 3.8–5.2)
RBC URINE: 1 /HPF
SODIUM SERPL-SCNC: 137 MMOL/L (ref 135–145)
SP GR UR STRIP: 1.04 (ref 1–1.03)
SQUAMOUS EPITHELIAL: 2 /HPF
UROBILINOGEN UR STRIP-MCNC: NORMAL MG/DL
WBC # BLD AUTO: 19.5 10E3/UL (ref 4–11)
WBC URINE: 4 /HPF

## 2024-02-07 PROCEDURE — 84145 PROCALCITONIN (PCT): CPT | Performed by: STUDENT IN AN ORGANIZED HEALTH CARE EDUCATION/TRAINING PROGRAM

## 2024-02-07 PROCEDURE — 81001 URINALYSIS AUTO W/SCOPE: CPT | Performed by: FAMILY MEDICINE

## 2024-02-07 PROCEDURE — 36415 COLL VENOUS BLD VENIPUNCTURE: CPT | Performed by: FAMILY MEDICINE

## 2024-02-07 PROCEDURE — 96375 TX/PRO/DX INJ NEW DRUG ADDON: CPT | Performed by: FAMILY MEDICINE

## 2024-02-07 PROCEDURE — 99284 EMERGENCY DEPT VISIT MOD MDM: CPT | Performed by: FAMILY MEDICINE

## 2024-02-07 PROCEDURE — 85025 COMPLETE CBC W/AUTO DIFF WBC: CPT | Performed by: FAMILY MEDICINE

## 2024-02-07 PROCEDURE — 250N000011 HC RX IP 250 OP 636: Performed by: STUDENT IN AN ORGANIZED HEALTH CARE EDUCATION/TRAINING PROGRAM

## 2024-02-07 PROCEDURE — 71045 X-RAY EXAM CHEST 1 VIEW: CPT

## 2024-02-07 PROCEDURE — 86140 C-REACTIVE PROTEIN: CPT | Performed by: FAMILY MEDICINE

## 2024-02-07 PROCEDURE — 96361 HYDRATE IV INFUSION ADD-ON: CPT | Performed by: FAMILY MEDICINE

## 2024-02-07 PROCEDURE — 80053 COMPREHEN METABOLIC PANEL: CPT | Performed by: FAMILY MEDICINE

## 2024-02-07 PROCEDURE — 250N000011 HC RX IP 250 OP 636: Performed by: FAMILY MEDICINE

## 2024-02-07 PROCEDURE — 250N000013 HC RX MED GY IP 250 OP 250 PS 637: Performed by: STUDENT IN AN ORGANIZED HEALTH CARE EDUCATION/TRAINING PROGRAM

## 2024-02-07 PROCEDURE — 99284 EMERGENCY DEPT VISIT MOD MDM: CPT | Mod: 25 | Performed by: FAMILY MEDICINE

## 2024-02-07 PROCEDURE — 81025 URINE PREGNANCY TEST: CPT | Performed by: FAMILY MEDICINE

## 2024-02-07 PROCEDURE — 258N000003 HC RX IP 258 OP 636: Performed by: FAMILY MEDICINE

## 2024-02-07 PROCEDURE — 96374 THER/PROPH/DIAG INJ IV PUSH: CPT | Performed by: FAMILY MEDICINE

## 2024-02-07 RX ORDER — KETOROLAC TROMETHAMINE 30 MG/ML
30 INJECTION, SOLUTION INTRAMUSCULAR; INTRAVENOUS ONCE
Status: COMPLETED | OUTPATIENT
Start: 2024-02-07 | End: 2024-02-07

## 2024-02-07 RX ORDER — PROCHLORPERAZINE MALEATE 5 MG
5 TABLET ORAL EVERY 6 HOURS PRN
Qty: 20 TABLET | Refills: 0 | Status: SHIPPED | OUTPATIENT
Start: 2024-02-07 | End: 2024-02-27

## 2024-02-07 RX ORDER — ONDANSETRON 2 MG/ML
4 INJECTION INTRAMUSCULAR; INTRAVENOUS ONCE
Status: COMPLETED | OUTPATIENT
Start: 2024-02-07 | End: 2024-02-07

## 2024-02-07 RX ADMIN — ONDANSETRON 4 MG: 2 INJECTION INTRAMUSCULAR; INTRAVENOUS at 17:59

## 2024-02-07 RX ADMIN — HYOSCYAMINE SULFATE 125 MCG: 0.12 TABLET, ORALLY DISINTEGRATING SUBLINGUAL at 20:26

## 2024-02-07 RX ADMIN — SODIUM CHLORIDE 1000 ML: 9 INJECTION, SOLUTION INTRAVENOUS at 17:58

## 2024-02-07 RX ADMIN — PROCHLORPERAZINE EDISYLATE 5 MG: 5 INJECTION INTRAMUSCULAR; INTRAVENOUS at 20:10

## 2024-02-07 RX ADMIN — KETOROLAC TROMETHAMINE 30 MG: 30 INJECTION, SOLUTION INTRAMUSCULAR; INTRAVENOUS at 20:09

## 2024-02-07 ASSESSMENT — ENCOUNTER SYMPTOMS
ABDOMINAL PAIN: 1
NAUSEA: 1
FEVER: 0
DIARRHEA: 1
VOMITING: 1

## 2024-02-07 ASSESSMENT — ACTIVITIES OF DAILY LIVING (ADL)
ADLS_ACUITY_SCORE: 37
ADLS_ACUITY_SCORE: 37

## 2024-02-07 NOTE — ED PROVIDER NOTES
History     Chief Complaint   Patient presents with    Abdominal Pain    Nausea, Vomiting, & Diarrhea     The history is provided by the patient, the spouse and medical records.     Lyn Ny is a 23 year old female who woke up this morning at about 0500 with abdominal cramping. She had felt good the night prior, went to bed about 10 PM, no alcohol that night. She tried a warm bath this morning for about an hour, she tried a heating pad and she tried walking around a bit. At about noon she took liquid antacid and then started to vomit. Her abdominal pain got much worse and she had some diarrhea. No fever.  She did notice on her pulse ox monitor that her pulse was around 130 today at home.  She tried Midol as well- no help.     She has had some cough for about the past week.  No sick contacts. No concerns that she is pregnant.     Allergies:  Allergies   Allergen Reactions    Azithromycin Other (See Comments)     Numbness in the right side, neurological changes.     Amoxicillin Nausea and Vomiting     Tolerates cephalexin    Cow's Milk [Milk (Cow)]     Doxycycline Nausea and Vomiting    Egg White (Egg Protein)     Prozac [Fluoxetine] Dizziness and Nausea and Vomiting    Rifampin Nausea and Vomiting    Shrimp      Also allergic to scallops    Wheat Extract     Hydrocodone Nausea     Vomiting    Seasonal Allergies Other (See Comments)     Dust, pollen    Wheat Germ Oil Other (See Comments)     Dust, pollen       Problem List:    Patient Active Problem List    Diagnosis Date Noted    Anxiety and depression 07/28/2023     Priority: Medium    Multiple food allergies 07/01/2023     Priority: Medium     Wheat (low), scallops (low), shrimp (moderate), cow's milk (moderate), egg white (moderate).      Migraine without aura and without status migrainosus, not intractable 06/22/2023     Priority: Medium    Congestion of paranasal sinus 06/22/2023     Priority: Medium    Mild persistent asthma without complication  10/24/2022     Priority: Medium    Acne 02/10/2018     Priority: Medium    Menorrhagia with irregular cycle 02/10/2018     Priority: Medium    Spondylolysis of lumbar region 07/17/2017     Priority: Medium        Past Medical History:    Past Medical History:   Diagnosis Date    Multiple food allergies 7/1/2023    Spondylolysis of lumbar region        Past Surgical History:    Past Surgical History:   Procedure Laterality Date    ENDOSCOPIC SINUS SURGERY N/A 10/31/2023    Procedure: Endoscopic Sinus Surgery;  Surgeon: Lalita Hewitt MD;  Location: [facility] OR    Lumbar fusion of L5  8/2/3017    subsequent infection    SEPTOPLASTY, TURBINOPLASTY, COMBINED Bilateral 10/31/2023    Procedure: Septoplasty, Turbinate Reduction, Excision Right Vicenta Bullosa;  Surgeon: Lalita Hewitt MD;  Location: [facility] OR       Family History:    Family History   Problem Relation Age of Onset    Hypertension Mother         Hypertension,Borderline    Hypertension Father         Hypertension    Hyperlipidemia Father         Hyperlipidemia    Hypertension Maternal Grandmother         Hypertension    Hypertension Maternal Grandfather         Hypertension    Hypertension Paternal Grandfather         Hypertension    Heart Disease Paternal Grandfather         Heart Disease,smoker       Social History:  Marital Status:  Single [1]  Social History     Tobacco Use    Smoking status: Never     Passive exposure: Never    Smokeless tobacco: Never    Tobacco comments:     Quit smoking: Mom smokes outside   Vaping Use    Vaping Use: Never used   Substance Use Topics    Alcohol use: No    Drug use: Never     Comment: Drug use: No        Medications:    acetaminophen (TYLENOL) 325 MG tablet  albuterol (PROAIR HFA/PROVENTIL HFA/VENTOLIN HFA) 108 (90 Base) MCG/ACT inhaler  budesonide (PULMICORT) 0.5 MG/2ML neb solution  cetirizine (ZYRTEC) 10 MG tablet  dexAMETHasone (DECADRON) 1 MG tablet  EPINEPHrine (ANY BX GENERIC EQUIV) 0.3 MG/0.3ML injection  2-pack  escitalopram (LEXAPRO) 10 MG tablet  escitalopram (LEXAPRO) 10 MG tablet  fexofenadine (ALLEGRA) 180 MG tablet  ipratropium - albuterol 0.5 mg/2.5 mg/3 mL (DUONEB) 0.5-2.5 (3) MG/3ML neb solution  lisinopril (ZESTRIL) 20 MG tablet  LOW-OGESTREL 0.3-30 MG-MCG tablet  metoclopramide (REGLAN) 10 MG tablet  mometasone (NASONEX) 50 MCG/ACT nasal spray  montelukast (SINGULAIR) 10 MG tablet  ondansetron (ZOFRAN ODT) 4 MG ODT tab  other medical supplies  oxyCODONE (ROXICODONE) 5 MG tablet  predniSONE (DELTASONE) 10 MG tablet  predniSONE (DELTASONE) 20 MG tablet  prochlorperazine (COMPAZINE) 10 MG tablet  rizatriptan (MAXALT-MLT) 5 MG ODT  topiramate (TOPAMAX) 25 MG tablet  vitamin B complex with vitamin C (VITAMIN  B COMPLEX) tablet  vitamin B-12 (CYANOCOBALAMIN) 2500 MCG sublingual tablet      Review of Systems   Constitutional:  Negative for fever.   Gastrointestinal:  Positive for abdominal pain, diarrhea, nausea and vomiting.   All other systems reviewed and are negative.      Physical Exam   BP: 107/79  Pulse: (!) 133  Temp: (!) 96.4  F (35.8  C)  Resp: 18  Weight: 90.3 kg (199 lb)  SpO2: 98 %      Physical Exam  Vitals and nursing note reviewed.   Constitutional:       General: She is not in acute distress.     Appearance: She is well-developed. She is not ill-appearing, toxic-appearing or diaphoretic.   Cardiovascular:      Rate and Rhythm: Regular rhythm. Tachycardia present.      Heart sounds: Normal heart sounds. No murmur heard.  Pulmonary:      Effort: Pulmonary effort is normal. No respiratory distress.      Breath sounds: Normal breath sounds. No stridor. No wheezing, rhonchi or rales.   Chest:      Chest wall: No tenderness.   Abdominal:      General: Abdomen is protuberant. Bowel sounds are normal.      Palpations: Abdomen is soft.      Tenderness: There is generalized abdominal tenderness.   Skin:     General: Skin is warm and dry.   Neurological:      General: No focal deficit present.      Mental  Status: She is alert and oriented to person, place, and time.         Results for orders placed or performed during the hospital encounter of 02/07/24 (from the past 24 hour(s))   CBC with platelets differential    Narrative    The following orders were created for panel order CBC with platelets differential.  Procedure                               Abnormality         Status                     ---------                               -----------         ------                     CBC with platelets and d...[[id number]]  Abnormal            Final result                 Please view results for these tests on the individual orders.   Comprehensive metabolic panel   Result Value Ref Range    Sodium 137 135 - 145 mmol/L    Potassium 5.0 3.4 - 5.3 mmol/L    Carbon Dioxide (CO2) 17 (L) 22 - 29 mmol/L    Anion Gap 17 (H) 7 - 15 mmol/L    Urea Nitrogen 10.1 6.0 - 20.0 mg/dL    Creatinine 0.68 0.51 - 0.95 mg/dL    GFR Estimate >90 >60 mL/min/1.73m2    Calcium 9.4 8.6 - 10.0 mg/dL    Chloride 103 98 - 107 mmol/L    Glucose 96 70 - 99 mg/dL    Alkaline Phosphatase 101 40 - 150 U/L    AST 24 0 - 45 U/L    ALT 13 0 - 50 U/L    Protein Total 8.0 6.4 - 8.3 g/dL    Albumin 4.1 3.5 - 5.2 g/dL    Bilirubin Total 0.3 <=1.2 mg/dL   CRP inflammation   Result Value Ref Range    CRP Inflammation 14.14 (H) <5.00 mg/L   CBC with platelets and differential   Result Value Ref Range    WBC Count 19.5 (H) 4.0 - 11.0 10e3/uL    RBC Count 5.35 (H) 3.80 - 5.20 10e6/uL    Hemoglobin 16.3 (H) 11.7 - 15.7 g/dL    Hematocrit 45.7 35.0 - 47.0 %    MCV 85 78 - 100 fL    MCH 30.5 26.5 - 33.0 pg    MCHC 35.7 31.5 - 36.5 g/dL    RDW 11.9 10.0 - 15.0 %    Platelet Count 408 150 - 450 10e3/uL    % Neutrophils 86 %    % Lymphocytes 9 %    % Monocytes 4 %    % Eosinophils 1 %    % Basophils 0 %    % Immature Granulocytes 0 %    NRBCs per 100 WBC 0 <1 /100    Absolute Neutrophils 16.7 (H) 1.6 - 8.3 10e3/uL    Absolute Lymphocytes 1.7 0.8 - 5.3 10e3/uL     Absolute Monocytes 0.8 0.0 - 1.3 10e3/uL    Absolute Eosinophils 0.1 0.0 - 0.7 10e3/uL    Absolute Basophils 0.1 0.0 - 0.2 10e3/uL    Absolute Immature Granulocytes 0.1 <=0.4 10e3/uL    Absolute NRBCs 0.0 10e3/uL   [place] Draw    Narrative    The following orders were created for panel order [place] Draw.  Procedure                               Abnormality         Status                     ---------                               -----------         ------                     Extra Blue Top Tube[[id number]]                              Final result               Extra Red Top Tube[[id number]]                               Final result               Extra Green Top (Lithium...[[id number]]                      Final result                 Please view results for these tests on the individual orders.   Extra Blue Top Tube   Result Value Ref Range    Hold Specimen x    Extra Red Top Tube   Result Value Ref Range    Hold Specimen x    Extra Green Top (Lithium Heparin) Tube   Result Value Ref Range    Hold Specimen x    XR Chest Port 1 View    Narrative    XR CHEST PORT 1 VIEW    HISTORY: 23 yearsFemale tachycardia, cough    TECHNIQUE: A single view of the chest was performed    COMPARISON: None    FINDINGS: Heart size and pulmonary vascularity are within normal  limits. Lungs are clear. No consolidating airspace opacities are  present.        Impression    IMPRESSION: Clear chest    HANNAH ROSENBERG MD         SYSTEM ID:  [id number]       Medications   sodium chloride 0.9% BOLUS 1,000 mL (1,000 mLs Intravenous $New Bag 2/7/24 1758)   ondansetron (ZOFRAN) injection 4 mg (4 mg Intravenous $Given 2/7/24 1759)       Assessments & Plan (with Medical Decision Making)  Lyn Ny is a 23 year old female who woke up this morning at about 0500 with abdominal cramping. She had felt good the night prior, went to bed about 10 PM, no alcohol that night. She tried a warm bath this morning for about an hour, she tried a heating  pad and she tried walking around a bit. At about noon she took liquid antacid and then started to vomit. Her abdominal pain got much worse and she had some diarrhea. No fever.  She did notice on her pulse ox monitor that her pulse was around 130 today at home.  She tried Midol as well- no help.   She has had some cough for about the past week.  No sick contacts. No concerns that she is pregnant.   VS in the ED on room air   Patient Vitals for the past 24 hrs:   BP Temp Temp src Pulse Resp SpO2 Weight   02/07/24 1800 (!) 129/93 -- -- 107 -- 96 % --   02/07/24 1750 121/86 -- -- -- -- 98 % --   02/07/24 1733 107/79 (!) 96.4  F (35.8  C) Tympanic (!) 133 18 98 % 90.3 kg (199 lb)     Exam shows tachycardia, otherwise no focused findings. We gave Zofran and IVF.   Labs show CBC with WBC 19,500, hgb 16.3, CMP stable, CRP 14.  Labs not collected:  UA, UPT  Chest xray stable.   7 PM  I am signing out her care to Dr Tatum at change of shift.      I have reviewed the nursing notes.    I have reviewed the findings, diagnosis, plan and need for follow up with the patient.  Medical Decision Making  The patient's presentation was of moderate complexity (an undiagnosed new problem with uncertain diagnosis).    The patient's evaluation involved:  an assessment requiring an independent historian (see separate area of note for details)  ordering and/or review of 3+ test(s) in this encounter (see separate area of note for details)    The patient's management necessitated further care after sign-out to Dr Tatum (see their note for further management).      Final diagnoses:   Abdominal cramping   Vomiting, unspecified vomiting type, unspecified whether nausea present       2/7/2024   [facility] AND [facility], Ender Abbott MD  02/07/24 8004

## 2024-02-07 NOTE — ED TRIAGE NOTES
Pt presents for abdominal pain, N/V/D, onset this morning as just some mild abdominal cramping, has progressed over the day to a diffuse abdominal pain rated at 9/10, with vomiting that is bright yellow, no blood, had normal BM this morning that has now progressed to diarrhea, no blood noted in stool. Took an antacid around noon that seemed to make things worse.     /79   Pulse (!) 133   Temp (!) 96.4  F (35.8  C) (Tympanic)   Resp 18   Wt 90.3 kg (199 lb)   LMP 01/18/2024 (Approximate)   SpO2 98%   BMI 33.12 kg/m         Triage Assessment (Adult)       Row Name 02/07/24 1733          Triage Assessment    Airway WDL WDL        Respiratory WDL    Respiratory WDL WDL        Skin Circulation/Temperature WDL    Skin Circulation/Temperature WDL WDL        Cardiac WDL    Cardiac WDL WDL        Peripheral/Neurovascular WDL    Peripheral Neurovascular WDL WDL        Cognitive/Neuro/Behavioral WDL    Cognitive/Neuro/Behavioral WDL WDL

## 2024-02-08 ENCOUNTER — MYC MEDICAL ADVICE (OUTPATIENT)
Dept: FAMILY MEDICINE | Facility: OTHER | Age: 24
End: 2024-02-08
Payer: COMMERCIAL

## 2024-02-08 NOTE — TELEPHONE ENCOUNTER
1/2/2024 ED for vomiting and diarrhea.   2/7/2024 ED for abdominal cramping, N&V, and diarrhea.      Aleta Boyle RN on 2/8/2024 at 4:54 PM

## 2024-02-08 NOTE — ED PROVIDER NOTES
Transfer of care from previous shift provider:. N/V/D and diffuse abdominal pain starting this morning. Similar recent episode thought to be viral gastroenteritis. Leukocytosis at this point felt to be likely stress leukomoid reaction. HGC/UA pending.     ED Course as of 02/07/24 2102 Wed Feb 07, 2024   2001 Continues to have abdominal cramping that is diffuse as well as nausea.  No vomiting here in the ER.  Reports that this is approximately her fourth similar episode over the past year.  She has seen endocrinology and gastroenterology, however gastroenterology was more for constipation possibly related to a medication.  No history of scopes.  No known family history of IBD.   2056 Patient felt much better after treatments including Levsin/Toradol/Compazine.  Procalcitonin negative.  Feels appropriate for discharge.     Assessment and Plan:  Final diagnoses:   Abdominal cramping   Vomiting, unspecified vomiting type, unspecified whether nausea present      At this point with negative procalcitonin and patient feeling much improved, there is low suspicion for abdominal etiology that would require surgery or serious bacterial infection requiring antibiotics.  Different includes IBD due to recurrent nature.  Recommend close PCP follow-up.  Disposition: home with attached instructions on diagnoses provided including ED return precautions.      Patient instructions:   Use Levsin as needed for cramping.  Use Compazine as needed for nausea/vomiting.    With recurrent similar symptoms, this could be something like inflammatory bowel disease.  Recommend close PCP follow-up and consideration of possible colonoscopy.    Please schedule appointment with PCP on your way out at registration desk.    Please review attached instructions including reasons to return to the emergency department.            Jasson Tatum MD  02/07/24 2104

## 2024-02-08 NOTE — DISCHARGE INSTRUCTIONS
Use Levsin as needed for cramping.  Use Compazine as needed for nausea/vomiting.    With recurrent similar symptoms, this could be something like inflammatory bowel disease.  Recommend close PCP follow-up and consideration of possible colonoscopy.    Please schedule appointment with PCP on your way out at registration desk.    Please review attached instructions including reasons to return to the emergency department.

## 2024-02-26 ASSESSMENT — ASTHMA QUESTIONNAIRES
QUESTION_2 LAST FOUR WEEKS HOW OFTEN HAVE YOU HAD SHORTNESS OF BREATH: MORE THAN ONCE A DAY
ACT_TOTALSCORE: 6
QUESTION_3 LAST FOUR WEEKS HOW OFTEN DID YOUR ASTHMA SYMPTOMS (WHEEZING, COUGHING, SHORTNESS OF BREATH, CHEST TIGHTNESS OR PAIN) WAKE YOU UP AT NIGHT OR EARLIER THAN USUAL IN THE MORNING: FOUR OR MORE NIGHTS A WEEK
QUESTION_5 LAST FOUR WEEKS HOW WOULD YOU RATE YOUR ASTHMA CONTROL: NOT CONTROLLED AT ALL
QUESTION_4 LAST FOUR WEEKS HOW OFTEN HAVE YOU USED YOUR RESCUE INHALER OR NEBULIZER MEDICATION (SUCH AS ALBUTEROL): THREE OR MORE TIMES PER DAY
ACT_TOTALSCORE: 6
QUESTION_1 LAST FOUR WEEKS HOW MUCH OF THE TIME DID YOUR ASTHMA KEEP YOU FROM GETTING AS MUCH DONE AT WORK, SCHOOL OR AT HOME: MOST OF THE TIME

## 2024-02-26 ASSESSMENT — PATIENT HEALTH QUESTIONNAIRE - PHQ9
SUM OF ALL RESPONSES TO PHQ QUESTIONS 1-9: 5
SUM OF ALL RESPONSES TO PHQ QUESTIONS 1-9: 5
10. IF YOU CHECKED OFF ANY PROBLEMS, HOW DIFFICULT HAVE THESE PROBLEMS MADE IT FOR YOU TO DO YOUR WORK, TAKE CARE OF THINGS AT HOME, OR GET ALONG WITH OTHER PEOPLE: VERY DIFFICULT

## 2024-02-27 ENCOUNTER — TELEPHONE (OUTPATIENT)
Dept: FAMILY MEDICINE | Facility: OTHER | Age: 24
End: 2024-02-27

## 2024-02-27 ENCOUNTER — OFFICE VISIT (OUTPATIENT)
Dept: FAMILY MEDICINE | Facility: OTHER | Age: 24
End: 2024-02-27
Attending: PHYSICIAN ASSISTANT
Payer: COMMERCIAL

## 2024-02-27 VITALS
WEIGHT: 226 LBS | HEART RATE: 100 BPM | RESPIRATION RATE: 20 BRPM | HEIGHT: 65 IN | BODY MASS INDEX: 37.65 KG/M2 | TEMPERATURE: 97.7 F | SYSTOLIC BLOOD PRESSURE: 120 MMHG | OXYGEN SATURATION: 96 % | DIASTOLIC BLOOD PRESSURE: 80 MMHG

## 2024-02-27 DIAGNOSIS — R80.9 PROTEINURIA, UNSPECIFIED TYPE: ICD-10-CM

## 2024-02-27 DIAGNOSIS — R11.2 NAUSEA AND VOMITING, UNSPECIFIED VOMITING TYPE: Primary | ICD-10-CM

## 2024-02-27 DIAGNOSIS — R10.84 ABDOMINAL PAIN, GENERALIZED: ICD-10-CM

## 2024-02-27 DIAGNOSIS — G43.009 MIGRAINE WITHOUT AURA AND WITHOUT STATUS MIGRAINOSUS, NOT INTRACTABLE: ICD-10-CM

## 2024-02-27 DIAGNOSIS — R80.9 PROTEINURIA, UNSPECIFIED TYPE: Primary | ICD-10-CM

## 2024-02-27 DIAGNOSIS — R82.90 ABNORMAL FINDING ON URINALYSIS: ICD-10-CM

## 2024-02-27 DIAGNOSIS — J01.90 ACUTE SINUSITIS WITH SYMPTOMS > 10 DAYS: ICD-10-CM

## 2024-02-27 DIAGNOSIS — K59.01 SLOW TRANSIT CONSTIPATION: ICD-10-CM

## 2024-02-27 LAB
ALBUMIN SERPL BCG-MCNC: 4.5 G/DL (ref 3.5–5.2)
ALBUMIN UR-MCNC: 20 MG/DL
ALP SERPL-CCNC: 103 U/L (ref 40–150)
ALT SERPL W P-5'-P-CCNC: 14 U/L (ref 0–50)
ANION GAP SERPL CALCULATED.3IONS-SCNC: 11 MMOL/L (ref 7–15)
APPEARANCE UR: ABNORMAL
AST SERPL W P-5'-P-CCNC: 13 U/L (ref 0–45)
BACTERIA #/AREA URNS HPF: ABNORMAL /HPF
BASOPHILS # BLD AUTO: 0.1 10E3/UL (ref 0–0.2)
BASOPHILS NFR BLD AUTO: 1 %
BILIRUB SERPL-MCNC: 0.2 MG/DL
BILIRUB UR QL STRIP: NEGATIVE
BUN SERPL-MCNC: 10.2 MG/DL (ref 6–20)
CALCIUM SERPL-MCNC: 9.8 MG/DL (ref 8.6–10)
CHLORIDE SERPL-SCNC: 103 MMOL/L (ref 98–107)
COLOR UR AUTO: YELLOW
CREAT SERPL-MCNC: 0.68 MG/DL (ref 0.51–0.95)
DEPRECATED HCO3 PLAS-SCNC: 26 MMOL/L (ref 22–29)
EGFRCR SERPLBLD CKD-EPI 2021: >90 ML/MIN/1.73M2
EOSINOPHIL # BLD AUTO: 0.5 10E3/UL (ref 0–0.7)
EOSINOPHIL NFR BLD AUTO: 6 %
ERYTHROCYTE [DISTWIDTH] IN BLOOD BY AUTOMATED COUNT: 11.7 % (ref 10–15)
GLUCOSE SERPL-MCNC: 90 MG/DL (ref 70–99)
GLUCOSE UR STRIP-MCNC: NEGATIVE MG/DL
HCT VFR BLD AUTO: 39.6 % (ref 35–47)
HGB BLD-MCNC: 13.8 G/DL (ref 11.7–15.7)
HGB UR QL STRIP: NEGATIVE
IMM GRANULOCYTES # BLD: 0 10E3/UL
IMM GRANULOCYTES NFR BLD: 0 %
KETONES UR STRIP-MCNC: NEGATIVE MG/DL
LEUKOCYTE ESTERASE UR QL STRIP: ABNORMAL
LYMPHOCYTES # BLD AUTO: 1.6 10E3/UL (ref 0–5.3)
LYMPHOCYTES NFR BLD AUTO: 19 %
MCH RBC QN AUTO: 30.3 PG (ref 26.5–33)
MCHC RBC AUTO-ENTMCNC: 34.8 G/DL (ref 31.5–36.5)
MCV RBC AUTO: 87 FL (ref 78–100)
MONOCYTES # BLD AUTO: 0.8 10E3/UL (ref 0–1.3)
MONOCYTES NFR BLD AUTO: 9 %
MUCOUS THREADS #/AREA URNS LPF: PRESENT /LPF
NEUTROPHILS # BLD AUTO: 5.5 10E3/UL (ref 1.6–8.3)
NEUTROPHILS NFR BLD AUTO: 65 %
NITRATE UR QL: NEGATIVE
NRBC # BLD AUTO: 0 10E3/UL
NRBC BLD AUTO-RTO: 0 /100
PH UR STRIP: 6 [PH] (ref 5–9)
PLATELET # BLD AUTO: 357 10E3/UL (ref 150–450)
POTASSIUM SERPL-SCNC: 4.4 MMOL/L (ref 3.4–5.3)
PROT SERPL-MCNC: 8.2 G/DL (ref 6.4–8.3)
RBC # BLD AUTO: 4.55 10E6/UL (ref 3.8–5.2)
RBC URINE: 3 /HPF
SODIUM SERPL-SCNC: 140 MMOL/L (ref 135–145)
SP GR UR STRIP: 1.03 (ref 1–1.03)
SQUAMOUS EPITHELIAL: 8 /HPF
UROBILINOGEN UR STRIP-MCNC: NORMAL MG/DL
WBC # BLD AUTO: 8.3 10E3/UL (ref 4–11)
WBC URINE: 7 /HPF

## 2024-02-27 PROCEDURE — 85025 COMPLETE CBC W/AUTO DIFF WBC: CPT | Mod: ZL | Performed by: PHYSICIAN ASSISTANT

## 2024-02-27 PROCEDURE — 87086 URINE CULTURE/COLONY COUNT: CPT | Mod: ZL | Performed by: PHYSICIAN ASSISTANT

## 2024-02-27 PROCEDURE — 81001 URINALYSIS AUTO W/SCOPE: CPT | Mod: ZL | Performed by: PHYSICIAN ASSISTANT

## 2024-02-27 PROCEDURE — 36415 COLL VENOUS BLD VENIPUNCTURE: CPT | Mod: ZL | Performed by: PHYSICIAN ASSISTANT

## 2024-02-27 PROCEDURE — 99214 OFFICE O/P EST MOD 30 MIN: CPT | Performed by: PHYSICIAN ASSISTANT

## 2024-02-27 PROCEDURE — 80053 COMPREHEN METABOLIC PANEL: CPT | Mod: ZL | Performed by: PHYSICIAN ASSISTANT

## 2024-02-27 RX ORDER — RIZATRIPTAN BENZOATE 5 MG/1
5 TABLET, ORALLY DISINTEGRATING ORAL
Qty: 20 TABLET | Refills: 11 | Status: SHIPPED | OUTPATIENT
Start: 2024-02-27 | End: 2024-08-07

## 2024-02-27 RX ORDER — CEPHALEXIN 500 MG/1
500 CAPSULE ORAL 3 TIMES DAILY
Qty: 21 CAPSULE | Refills: 0 | Status: SHIPPED | OUTPATIENT
Start: 2024-02-27 | End: 2024-03-05

## 2024-02-27 RX ORDER — PROCHLORPERAZINE MALEATE 5 MG
5 TABLET ORAL EVERY 6 HOURS PRN
Qty: 20 TABLET | Refills: 5 | Status: SHIPPED | OUTPATIENT
Start: 2024-02-27 | End: 2024-07-01

## 2024-02-27 ASSESSMENT — ENCOUNTER SYMPTOMS: COUGH: 1

## 2024-02-27 ASSESSMENT — PAIN SCALES - GENERAL: PAINLEVEL: NO PAIN (0)

## 2024-02-27 NOTE — NURSING NOTE
"Chief Complaint   Patient presents with    Follow Up     ER/ DOS- 02/07/24- [facility]- abdominal cramping, nausea, vomit, diarrhea     Cough       Initial /80   Pulse 100   Temp 97.7  F (36.5  C) (Temporal)   Resp 20   Ht 1.651 m (5' 5\")   Wt 102.5 kg (226 lb)   LMP 02/18/2024   SpO2 96%   BMI 37.61 kg/m   Estimated body mass index is 37.61 kg/m  as calculated from the following:    Height as of this encounter: 1.651 m (5' 5\").    Weight as of this encounter: 102.5 kg (226 lb).  Medication Review: complete    The next two questions are to help us understand your food security.  If you are feeling you need any assistance in this area, we have resources available to support you today.          2/26/2024   SDOH- Food Insecurity   Within the past 12 months, did you worry that your food would run out before you got money to buy more? N   Within the past 12 months, did the food you bought just not last and you didn t have money to get more? N         Health Care Directive:  Patient does not have a Health Care Directive or Living Will: Discussed advance care planning with patient; however, patient declined at this time.    [place] Vazquez, LPN      "

## 2024-02-27 NOTE — PATIENT INSTRUCTIONS
Constipation - Encouraged increase of water and apple, pear and prune juice to decrease symptoms and discomfort.  Use age appropriate over the counter medications such as stool softeners (docusate) or miralax for constipation relief.  Encouraged soft stools. Return to clinic with change/worsening of symptoms.     Try small amounts of food and drink frequently. Offer a bland diet. Advance as tolerated. Avoid dairy products the first day. You may add yogurt tomorrow as the first dairy product.    Try the BRAT diet (bread, bananas, rice, applesauce, tea, toast).  This is a very bland diet which should not irritate your colon.  Hold off on spicy foods, red sauces, mexican or chinese food.    Call or return to clinic as needed if your symptoms worsen or fail to improve as anticipated.     If the pain does not begin improving, localizes to the right lower belly, there is increased fever, or other progression of symptoms, return for reassessment.    Should I see a doctor or nurse about my stomach ache? -- Most people do not need to see a doctor or nurse for a stomach ache. But you should see your doctor or nurse if:  ?You have bloody bowel movements, diarrhea, or vomiting  ?Your pain is severe and lasts more than an hour or comes and goes for more than 24 hours  ?You cannot eat or drink for hours  ?You have a fever higher than 102 F (39 C)  ?You lose a lot of weight without trying to, or lose interest in food

## 2024-02-27 NOTE — TELEPHONE ENCOUNTER
Patient states Joyce Bishop told her if her RBCs and WBCs were higher in her urine she would be more concerned. Patient was reviewing her urinalysis results and states both of those were higher than they have been in the past. Is this something the provider has concerns about?    Ronel Lucio, CMA on 2/27/2024 at 4:25 PM

## 2024-02-27 NOTE — PROGRESS NOTES
Assessment & Plan   Problem List Items Addressed This Visit          Nervous and Auditory    Migraine without aura and without status migrainosus, not intractable    Relevant Medications    rizatriptan (MAXALT-MLT) 5 MG ODT     Other Visit Diagnoses       Nausea and vomiting, unspecified vomiting type    -  Primary    Relevant Medications    prochlorperazine (COMPAZINE) 5 MG tablet    Other Relevant Orders    Adult GI  Referral - Procedure Only    CBC and Differential    Comprehensive Metabolic Panel    Abdominal pain, generalized        Relevant Orders    Adult GI  Referral - Procedure Only    CBC and Differential    Comprehensive Metabolic Panel    Slow transit constipation        Relevant Orders    Adult GI  Referral - Procedure Only    CBC and Differential    Comprehensive Metabolic Panel    Acute sinusitis with symptoms > 10 days        Relevant Medications    cephALEXin (KEFLEX) 500 MG capsule    Proteinuria, unspecified type        Relevant Orders    UA reflex to Microscopic           Migraines: Refilled Maxalt.  No acute concerns at this time.  Patient is currently stable.    Nausea, vomiting, generalized abdominal pain, history of constipation: Discussed symptoms and concerns at length.  Completed repeat CBC and CMP for monitoring to ensure that they have normalized.  Referred for an upper and lower endoscopy to rule out concerns with the recurrent symptoms and episodes.    Completed repeat urinalysis to ensure protein in the urine has resolved.  Currently asymptomatic.  Encouraged to continue increasing water intake.    Acute sinusitis: Patient was given Keflex for treatment of an acute sinus infection.  Can use over-the-counter cough and cold remedies as needed for symptomatic relief.  Increase fluids with electrolytes and rest.    Ordering of each unique test  Prescription drug management  30 minutes spent by me on the date of the encounter doing chart review, history and  "exam, documentation and further activities per the note     BMI  Estimated body mass index is 37.61 kg/m  as calculated from the following:    Height as of this encounter: 1.651 m (5' 5\").    Weight as of this encounter: 102.5 kg (226 lb).   Weight management plan: Discussed healthy diet and exercise guidelines    See Patient Instructions    No follow-ups on file.      Anabel Nichoslon is a 23 year old, presenting for the following health issues:  Follow Up (ER/ DOS- 02/07/24- [facility]- abdominal cramping, nausea, vomit, diarrhea ) and Cough        2/27/2024     2:55 PM   Additional Questions   Roomed by Chelita DIXON LPN   Accompanied by self     History of Present Illness       Reason for visit:  Mutiple ER visit concerns about most likely having cyclic vomiting syndrome  Symptom onset:  More than a month  Symptoms include:  Headaches, severe abdominal pain, chronic vomitting 40-50 times a day during episode  Symptom intensity:  Severe  Symptom progression:  Staying the same  Had these symptoms before:  No  What makes it worse:  Not enough fluids or if i eating  What makes it better:  Pain meds and fluids given when i come to ER every episode    She eats 0-1 servings of fruits and vegetables daily.She consumes 1 sweetened beverage(s) daily.She exercises with enough effort to increase her heart rate 20 to 29 minutes per day.  She exercises with enough effort to increase her heart rate 4 days per week.   She is taking medications regularly.    Patient has history of recurrent abdominal cramping episodes.  Has been in the emergency room twice in the past 2 months.  Has had 4 total episodes of severe abdominal pain episodes.  Tried Midol however this did not help.  Has been seen by endocrinology and gastroenterology in the past.  History of constipation in the past.  No history of scopes.  No family history of IBD.  Abdominal CT in July 2023 was stable.  She states that she will wake up with severe abdominal cramping all " "over her stomach.  Nothing helps.  A few hours later she will start to vomit every 5 to 10 minutes for approximately 15 hours straight.  Attempts to eat and drink however she throws it up.  She was given pain medication along with antinausea medication in the ER which helped alleviate her symptoms.  Will wake up the next day feeling better.  Having symptoms a month apart.  Not during her menstrual cycle.  History of having minimal menstrual cramps.  Tries warm baths for the abdominal pain.  Mild constipation a few months ago however this is improved.  Using over-the-counter laxatives.  Diarrhea with last visit.  Last bowel movement was yesterday.  Bowel movements have been more regular.    Patient has history of migraines.  Needs refill of her medications.  They have been stable.  No acute concerns at this time.    Would like to recheck urine today.  Most recent ER visit showed increased protein in the urine.  She was dehydrated at the time.  Would like to recheck her urine for protein.    Having persistent cough and cold symptoms.  Having sinus congestion and drainage.  Has a persistent cough.  Wondering about getting an antibiotic for treatment.  No sore throat, fevers, wheezing, rattling.      Review of Systems  Constitutional, neuro, ENT, endocrine, pulmonary, cardiac, gastrointestinal, genitourinary, musculoskeletal, integument and psychiatric systems are negative, except as otherwise noted.      Objective    /80   Pulse 100   Temp 97.7  F (36.5  C) (Temporal)   Resp 20   Ht 1.651 m (5' 5\")   Wt 102.5 kg (226 lb)   LMP 02/18/2024   SpO2 96%   BMI 37.61 kg/m    Body mass index is 37.61 kg/m .  Physical Exam  Vitals and nursing note reviewed.   Constitutional:       General: She is not in acute distress.     Appearance: Normal appearance. She is well-developed.   HENT:      Head: Normocephalic and atraumatic.      Mouth/Throat:      Mouth: Mucous membranes are moist.      Pharynx: Oropharynx is " clear. No oropharyngeal exudate or posterior oropharyngeal erythema.   Eyes:      Extraocular Movements: Extraocular movements intact.      Conjunctiva/sclera: Conjunctivae normal.   Cardiovascular:      Rate and Rhythm: Normal rate and regular rhythm.      Heart sounds: Normal heart sounds, S1 normal and S2 normal. No murmur heard.  Pulmonary:      Effort: Pulmonary effort is normal. No respiratory distress.      Breath sounds: Normal breath sounds. No wheezing or rales.   Abdominal:      General: Abdomen is flat. Bowel sounds are normal.      Palpations: Abdomen is soft. There is no mass.      Tenderness: There is no abdominal tenderness. There is no right CVA tenderness, left CVA tenderness, guarding or rebound.      Hernia: No hernia is present.   Musculoskeletal:         General: Normal range of motion.      Cervical back: Normal range of motion.   Skin:     General: Skin is warm and dry.      Findings: No rash.   Neurological:      General: No focal deficit present.      Mental Status: She is alert and oriented to person, place, and time.   Psychiatric:         Mood and Affect: Mood normal.         Behavior: Behavior normal.         Thought Content: Thought content normal.         Judgment: Judgment normal.         No results found for any visits on 02/27/24.        Signed Electronically by: Nadine Bishop PA-C

## 2024-02-28 ENCOUNTER — MYC MEDICAL ADVICE (OUTPATIENT)
Dept: FAMILY MEDICINE | Facility: OTHER | Age: 24
End: 2024-02-28
Payer: COMMERCIAL

## 2024-02-28 ENCOUNTER — TELEPHONE (OUTPATIENT)
Dept: SURGERY | Facility: OTHER | Age: 24
End: 2024-02-28
Payer: COMMERCIAL

## 2024-02-28 DIAGNOSIS — R31.29 MICROSCOPIC HEMATURIA: Primary | ICD-10-CM

## 2024-02-28 DIAGNOSIS — R80.9 PROTEINURIA, UNSPECIFIED TYPE: ICD-10-CM

## 2024-02-28 NOTE — TELEPHONE ENCOUNTER
Note added to GI referral that patient would like to have it sent to [facility]'s      Also routing to  unit 4 scheduling pool to put on referrals team's radar due to this being a referral placed by Joyce Bishop.    Jessy Hutton, RN on 2/28/2024 at 11:29 AM

## 2024-02-28 NOTE — TELEPHONE ENCOUNTER
Please disregard. Accidentally mychart message sent to patient. I sent her another message with proper note. Attempted to call patient, no answer. Will try again.     Ronel Chaves NP on 2/28/2024 at 10:11 AM

## 2024-03-01 LAB — BACTERIA UR CULT: NORMAL

## 2024-03-04 NOTE — TELEPHONE ENCOUNTER
Reached out to referral to re-fax GI referral to [facility].  Patient requesting it be faxed to [phone].  Gave this number to referrals staff.         Aleta Boyle RN on 3/4/2024 at 4:10 PM

## 2024-03-13 DIAGNOSIS — R09.81 CONGESTION OF PARANASAL SINUS: ICD-10-CM

## 2024-03-15 RX ORDER — MONTELUKAST SODIUM 10 MG/1
1 TABLET ORAL AT BEDTIME
Qty: 90 TABLET | Refills: 0 | Status: SHIPPED | OUTPATIENT
Start: 2024-03-15 | End: 2024-04-23

## 2024-03-15 NOTE — TELEPHONE ENCOUNTER
[facility] in #[id number] in [facility] [place] sent Rx request for the following:      Requested Prescriptions   Pending Prescriptions Disp Refills    montelukast (SINGULAIR) 10 MG tablet [Pharmacy Med Name: MONTELUKAST SOD 10 MG TABLET] 90 tablet 0     Sig: TAKE 1 TABLET BY MOUTH EVERYDAY AT BEDTIME        Last Prescription Date:   12/15/23  Last Fill Qty/Refills:         90, R-0    Last Office Visit:              2/27/24   Future Office visit:           none    Prescription approved per [facility] Refill Protocol.    ASHLEY IBARRA RN on 3/15/2024 at 3:17 PM

## 2024-03-18 ENCOUNTER — TRANSFERRED RECORDS (OUTPATIENT)
Dept: HEALTH INFORMATION MANAGEMENT | Facility: OTHER | Age: 24
End: 2024-03-18
Payer: COMMERCIAL

## 2024-04-16 ASSESSMENT — ASTHMA QUESTIONNAIRES
QUESTION_3 LAST FOUR WEEKS HOW OFTEN DID YOUR ASTHMA SYMPTOMS (WHEEZING, COUGHING, SHORTNESS OF BREATH, CHEST TIGHTNESS OR PAIN) WAKE YOU UP AT NIGHT OR EARLIER THAN USUAL IN THE MORNING: ONCE OR TWICE
ACT_TOTALSCORE: 18
QUESTION_5 LAST FOUR WEEKS HOW WOULD YOU RATE YOUR ASTHMA CONTROL: SOMEWHAT CONTROLLED
QUESTION_2 LAST FOUR WEEKS HOW OFTEN HAVE YOU HAD SHORTNESS OF BREATH: ONCE OR TWICE A WEEK
ACT_TOTALSCORE: 18
QUESTION_4 LAST FOUR WEEKS HOW OFTEN HAVE YOU USED YOUR RESCUE INHALER OR NEBULIZER MEDICATION (SUCH AS ALBUTEROL): TWO OR THREE TIMES PER WEEK
QUESTION_1 LAST FOUR WEEKS HOW MUCH OF THE TIME DID YOUR ASTHMA KEEP YOU FROM GETTING AS MUCH DONE AT WORK, SCHOOL OR AT HOME: A LITTLE OF THE TIME

## 2024-04-23 ENCOUNTER — MYC MEDICAL ADVICE (OUTPATIENT)
Dept: FAMILY MEDICINE | Facility: OTHER | Age: 24
End: 2024-04-23

## 2024-04-23 ENCOUNTER — OFFICE VISIT (OUTPATIENT)
Dept: FAMILY MEDICINE | Facility: OTHER | Age: 24
End: 2024-04-23
Attending: FAMILY MEDICINE
Payer: COMMERCIAL

## 2024-04-23 VITALS
TEMPERATURE: 97.7 F | HEART RATE: 78 BPM | RESPIRATION RATE: 16 BRPM | SYSTOLIC BLOOD PRESSURE: 116 MMHG | WEIGHT: 229.6 LBS | OXYGEN SATURATION: 96 % | BODY MASS INDEX: 38.21 KG/M2 | DIASTOLIC BLOOD PRESSURE: 74 MMHG

## 2024-04-23 DIAGNOSIS — E66.01 CLASS 2 SEVERE OBESITY WITH SERIOUS COMORBIDITY AND BODY MASS INDEX (BMI) OF 38.0 TO 38.9 IN ADULT, UNSPECIFIED OBESITY TYPE (H): Primary | ICD-10-CM

## 2024-04-23 DIAGNOSIS — R63.5 WEIGHT GAIN: ICD-10-CM

## 2024-04-23 DIAGNOSIS — J45.30 MILD PERSISTENT ASTHMA WITHOUT COMPLICATION: ICD-10-CM

## 2024-04-23 DIAGNOSIS — E66.812 CLASS 2 SEVERE OBESITY WITH SERIOUS COMORBIDITY AND BODY MASS INDEX (BMI) OF 38.0 TO 38.9 IN ADULT, UNSPECIFIED OBESITY TYPE (H): Primary | ICD-10-CM

## 2024-04-23 PROCEDURE — 99214 OFFICE O/P EST MOD 30 MIN: CPT | Mod: 25 | Performed by: FAMILY MEDICINE

## 2024-04-23 PROCEDURE — 90471 IMMUNIZATION ADMIN: CPT | Performed by: FAMILY MEDICINE

## 2024-04-23 PROCEDURE — 90677 PCV20 VACCINE IM: CPT | Performed by: FAMILY MEDICINE

## 2024-04-23 RX ORDER — TIRZEPATIDE 2.5 MG/.5ML
2.5 INJECTION, SOLUTION SUBCUTANEOUS
Qty: 2 ML | Refills: 2 | Status: SHIPPED | OUTPATIENT
Start: 2024-04-23 | End: 2024-05-23

## 2024-04-23 RX ORDER — FLUTICASONE PROPIONATE 110 UG/1
1 AEROSOL, METERED RESPIRATORY (INHALATION) 2 TIMES DAILY
Qty: 12 G | Refills: 11 | Status: SHIPPED | OUTPATIENT
Start: 2024-04-23

## 2024-04-23 ASSESSMENT — PATIENT HEALTH QUESTIONNAIRE - PHQ9
SUM OF ALL RESPONSES TO PHQ QUESTIONS 1-9: 0
10. IF YOU CHECKED OFF ANY PROBLEMS, HOW DIFFICULT HAVE THESE PROBLEMS MADE IT FOR YOU TO DO YOUR WORK, TAKE CARE OF THINGS AT HOME, OR GET ALONG WITH OTHER PEOPLE: NOT DIFFICULT AT ALL
SUM OF ALL RESPONSES TO PHQ QUESTIONS 1-9: 0

## 2024-04-23 ASSESSMENT — PAIN SCALES - GENERAL: PAINLEVEL: NO PAIN (0)

## 2024-04-23 NOTE — NURSING NOTE
Chief Complaint   Patient presents with    Weight Problem         Medication Reconciliation: complete    Ronel Conner, LPN

## 2024-04-23 NOTE — PROGRESS NOTES
Nursing Notes:   Ronel Conner LPN  4/23/2024  8:32 AM  Signed  Chief Complaint   Patient presents with    Weight Problem         Medication Reconciliation: complete    Ronel Conner LPN      Anabel Nicholson is a 23 year old, presenting for the following health issues:  Weight Problem        4/23/2024     8:28 AM   Additional Questions   Roomed by Ronel Conner     Lyn is here to discuss weight loss.  Has had ongoing weight gain over the past 2-3 years.  She had weighed 170 lb for a while.  The past 2 months has been drinking 64 oz of water per day.  She is cutting back on sugar, eating protein and working out consistently.  She has not lost any weight with this.  She is not tracking calories, but has definitely cut back on overall caloric intake.  She has underlying hypertension.  She does take lexapro for her anxiety and depression. She feels that from a mental health standpoint she is doing well and very stable.      ASTHMA HISTORY    Taking cetirizine twice daily lately due to increased allergy symptoms, which has helped.  Does have dogs/cats at home, which she knows she is allergic to.    Frequency of daytime symptoms:  coughing daily, slightly better since increasing cetirizine.  Frequency of nighttime symptoms:  not currently  How many albuterol puffs per week: daily  How often are you missing out on activities:  never  Triggers:  cats/dogs, pollen, dust  How do you try to avoid triggers: avoidance  Medications:  albuterol   Inhaler technique:  good    Current control:  no  Barriers to treatment:  not adequately controlled  ER visits/Hospitalizations in past year?  none  Recent need for steroid treatment?  none  Asthma Action Plan up to date?  Yes, 10/26/23  Tobacco use?   no  Interested in cessation?  N/a          10/26/2023     9:57 AM 2/26/2024     4:55 PM 4/16/2024     3:45 PM   ACT Total Scores   ACT TOTAL SCORE (Goal Greater than or Equal to 20) 17 6 18   In the past 12 months, how many  times did you visit the emergency room for your asthma without being admitted to the hospital? 0 0 0   In the past 12 months, how many times were you hospitalized overnight because of your asthma? 0 0 0         12/4/2023    12:30 PM 2/26/2024     4:51 PM 4/23/2024     8:28 AM   PHQ   PHQ-9 Total Score 4 5 0   Q9: Thoughts of better off dead/self-harm past 2 weeks Not at all Not at all Not at all         7/27/2023     3:09 PM 8/17/2023     7:49 AM 12/4/2023    12:30 PM   RAHAT-7 SCORE   Total Score 12 (moderate anxiety) 13 (moderate anxiety) 1 (minimal anxiety)   Total Score 12 13 1           History of Present Illness       Reason for visit:  I want to go on wegovy. I have four family memebers on it right now. They have had nothing but success. I ve tried losing weight by exercising and eating better. Nothing has worked and I am still putting on weight.    She eats 4 or more servings of fruits and vegetables daily.She consumes 1 sweetened beverage(s) daily.She exercises with enough effort to increase her heart rate 60 or more minutes per day.  She exercises with enough effort to increase her heart rate 5 days per week.   She is taking medications regularly.         Weight         Review of Systems  Constitutional, HEENT, cardiovascular, pulmonary, GI, , musculoskeletal, neuro, skin, endocrine and psych systems are negative, except as otherwise noted.      Objective    /74   Pulse 78   Temp 97.7  F (36.5  C) (Tympanic)   Resp 16   Wt 104.1 kg (229 lb 9.6 oz)   LMP 04/15/2024 (Approximate)   SpO2 96%   Breastfeeding No   BMI 38.21 kg/m    Body mass index is 38.21 kg/m .  Physical Exam  Constitutional:       Appearance: Normal appearance. She is obese.   HENT:      Head: Normocephalic.   Eyes:      Extraocular Movements: Extraocular movements intact.      Pupils: Pupils are equal, round, and reactive to light.   Cardiovascular:      Rate and Rhythm: Normal rate and regular rhythm.      Heart sounds:  Normal heart sounds. No murmur heard.  Pulmonary:      Effort: Pulmonary effort is normal.      Breath sounds: Normal breath sounds. No wheezing, rhonchi or rales.   Musculoskeletal:      Cervical back: Normal range of motion and neck supple.      Right lower leg: No edema.      Left lower leg: No edema.   Lymphadenopathy:      Cervical: No cervical adenopathy.   Neurological:      Mental Status: She is alert.   Psychiatric:         Mood and Affect: Mood normal.         Behavior: Behavior normal.            ICD-10-CM    1. Class 2 severe obesity with serious comorbidity and body mass index (BMI) of 38.0 to 38.9 in adult, unspecified obesity type (H)  E66.01 Nutrition Referral    Z68.38 tirzepatide (MOUNJARO) 2.5 MG/0.5ML pen      2. Mild persistent asthma without complication  J45.30 fluticasone (FLOVENT HFA) 110 MCG/ACT inhaler           Unfortunately, her antidepressant may be adding to weight gain.  She has already tried eating a higher protein low carb diet and increasing exercise over the past 2 months without any significant change in her weight.  She is referred to dietician for education.  Will try prescribing Mounjaro 2.5 mg subcutaneously every 7 days, but discussed it's unlikely to be covered without a diagnosis of diabetes, even if she has comorbidities (which she does - hypertension).  Discussed side effects of Mounjaro if this is covered.  Would want her to follow up again in 1 month after starting to make sure she is tolerating and titrate up on dose if needed.  Discussed if not covered, she could consider trial of phentermine instead.  Discussed side effects of this medication as well and that I would also want her to follow up a month after starting this to recheck her blood pressure if she goes this route.  Not adequately controlled.  Allergies seem to be a huge  of her asthma.  She has albuterol, but is not currently on an inhaled steroid.  Start on Flovent 110 mcg - 1 puff twice daily.  If  not helping, follow up as well.  Taking zyrtec 10 mg twice daily right now.  Has stopped singulair.  Prevnar 20 updated today.    Encouraged her to make an appointment for a physical/Pap Smear.    Return for Physical Exam.         Narcisa Steve MD

## 2024-04-30 ENCOUNTER — LAB (OUTPATIENT)
Dept: LAB | Facility: OTHER | Age: 24
End: 2024-04-30
Attending: FAMILY MEDICINE
Payer: COMMERCIAL

## 2024-04-30 DIAGNOSIS — E66.01 CLASS 2 SEVERE OBESITY WITH SERIOUS COMORBIDITY AND BODY MASS INDEX (BMI) OF 38.0 TO 38.9 IN ADULT, UNSPECIFIED OBESITY TYPE (H): ICD-10-CM

## 2024-04-30 DIAGNOSIS — R63.5 WEIGHT GAIN: ICD-10-CM

## 2024-04-30 DIAGNOSIS — E66.812 CLASS 2 SEVERE OBESITY WITH SERIOUS COMORBIDITY AND BODY MASS INDEX (BMI) OF 38.0 TO 38.9 IN ADULT, UNSPECIFIED OBESITY TYPE (H): ICD-10-CM

## 2024-04-30 LAB — HBA1C MFR BLD: 5.3 % (ref 4–6.2)

## 2024-04-30 PROCEDURE — 83036 HEMOGLOBIN GLYCOSYLATED A1C: CPT | Mod: ZL

## 2024-04-30 PROCEDURE — 83525 ASSAY OF INSULIN: CPT | Mod: ZL

## 2024-04-30 PROCEDURE — 36415 COLL VENOUS BLD VENIPUNCTURE: CPT | Mod: ZL

## 2024-05-02 LAB — INSULIN SERPL-ACNC: 20.7 UU/ML (ref 2.6–24.9)

## 2024-05-08 ENCOUNTER — MYC MEDICAL ADVICE (OUTPATIENT)
Dept: FAMILY MEDICINE | Facility: OTHER | Age: 24
End: 2024-05-08
Payer: COMMERCIAL

## 2024-05-08 DIAGNOSIS — E66.812 CLASS 2 SEVERE OBESITY WITH SERIOUS COMORBIDITY AND BODY MASS INDEX (BMI) OF 38.0 TO 38.9 IN ADULT, UNSPECIFIED OBESITY TYPE (H): Primary | ICD-10-CM

## 2024-05-08 DIAGNOSIS — E66.01 CLASS 2 SEVERE OBESITY WITH SERIOUS COMORBIDITY AND BODY MASS INDEX (BMI) OF 38.0 TO 38.9 IN ADULT, UNSPECIFIED OBESITY TYPE (H): Primary | ICD-10-CM

## 2024-05-08 NOTE — TELEPHONE ENCOUNTER
4/23/24  Flovent stuck in system.  Released to [facility].     4/23/24  Mounjaro ordered.  No stuck in system.      Reached out to [facility] to inquire about PA on Mounjaro.    Got the response:  Not covered by health plan.     Patient update on MyChart.    From 4/23/24 OV note with [facility]:    Will try prescribing Mounjaro 2.5 mg subcutaneously every 7 days, but discussed it's unlikely to be covered without a diagnosis of diabetes, even if she has comorbidities (which she does - hypertension). Discussed side effects of Mounjaro if this is covered. Would want her to follow up again in 1 month after starting to make sure she is tolerating and titrate up on dose if needed. Discussed if not covered, she could consider trial of phentermine instead. Discussed side effects of this medication as well and that I would also want her to follow up a month after starting this to recheck her blood pressure if she goes this route.     Aleta Boyle RN on 5/8/2024 at 3:09 PM

## 2024-05-09 RX ORDER — PHENTERMINE HYDROCHLORIDE 15 MG/1
15 CAPSULE ORAL EVERY MORNING
Qty: 30 CAPSULE | Refills: 2 | Status: SHIPPED | OUTPATIENT
Start: 2024-05-09 | End: 2024-05-09

## 2024-05-09 RX ORDER — PHENTERMINE HYDROCHLORIDE 15 MG/1
15 CAPSULE ORAL EVERY MORNING
Qty: 30 CAPSULE | Refills: 2 | Status: SHIPPED | OUTPATIENT
Start: 2024-05-09 | End: 2024-07-01

## 2024-05-28 ENCOUNTER — PATIENT OUTREACH (OUTPATIENT)
Dept: FAMILY MEDICINE | Facility: OTHER | Age: 24
End: 2024-05-28
Payer: COMMERCIAL

## 2024-05-28 NOTE — LETTER
[facility]  [address]  [place] [address]  [phone]       May 28, 2024    Lyn Villaseñor  [address] [place] [address]    Dear Lyn,    We care about your health and have reviewed your health plan and are making recommendations based on this review, to optimize your health.    You are in particular need of attention regarding:  -Wellness (Physical) Visit     We are recommending that you:  -schedule a WELLNESS (Physical) APPOINTMENT with me.       In addition, here is a list of due or overdue Health Maintenance reminders.    Health Maintenance Due   Topic Date Due    PAP Smear  Never done    Yearly Preventive Visit  10/24/2023    Chlamydia Screening  10/24/2023       To address the above recommendations, we encourage you to contact us at [phone].They will assist you with finding the most convenient time and location.    Thank you for trusting [facility] and we appreciate the opportunity to serve you.  We look forward to supporting your healthcare needs in the future.    Healthy Regards,    Your [facility] Team

## 2024-05-28 NOTE — TELEPHONE ENCOUNTER
Patient Quality Outreach    Patient is due for the following:   Physical Preventive Adult Physical    Next Steps:   Schedule a Adult Preventative    Type of outreach:    Sent letter.      Questions for provider review:    None           Rox Quiros

## 2024-05-30 ENCOUNTER — OFFICE VISIT (OUTPATIENT)
Dept: FAMILY MEDICINE | Facility: OTHER | Age: 24
End: 2024-05-30
Attending: FAMILY MEDICINE
Payer: COMMERCIAL

## 2024-05-30 ENCOUNTER — NURSE TRIAGE (OUTPATIENT)
Dept: FAMILY MEDICINE | Facility: OTHER | Age: 24
End: 2024-05-30

## 2024-05-30 VITALS — WEIGHT: 224.2 LBS | BODY MASS INDEX: 37.31 KG/M2

## 2024-05-30 DIAGNOSIS — E66.812 CLASS 2 SEVERE OBESITY WITH SERIOUS COMORBIDITY AND BODY MASS INDEX (BMI) OF 38.0 TO 38.9 IN ADULT, UNSPECIFIED OBESITY TYPE (H): ICD-10-CM

## 2024-05-30 DIAGNOSIS — E66.01 CLASS 2 SEVERE OBESITY WITH SERIOUS COMORBIDITY AND BODY MASS INDEX (BMI) OF 38.0 TO 38.9 IN ADULT, UNSPECIFIED OBESITY TYPE (H): ICD-10-CM

## 2024-05-30 PROCEDURE — 97802 MEDICAL NUTRITION INDIV IN: CPT | Performed by: DIETITIAN, REGISTERED

## 2024-05-30 NOTE — TELEPHONE ENCOUNTER
Ultimately I think it is reasonable for her to continue this medication if she wants to treat the dry mouth symptoms as you have advised her (lozenges etc.). Dry mouth is a common side effect of this medication. Hoarse voice certainly could be caused by dry mouth/airway. Recommend increasing water intake. If symptoms worsen or do not improve then ultimately would recommend stopping the medication or if the symptoms remain bothersome.     Sincerely,   Ronel Chaves NP on 5/30/2024 at 1:12 PM

## 2024-05-30 NOTE — TELEPHONE ENCOUNTER
". NAME of MEDICINE: \"What medicine(s) are you calling about?\"      phentermine   2. QUESTION: \"What is your question?\" (e.g., double dose of medicine, side effect)      Patient wants to know if it is okay for her to continue taking this medication as she thinks it is causing her dry mouth and loss of voice.  3. PRESCRIBER: \"Who prescribed the medicine?\" Reason: if prescribed by specialist, call should be referred to that group.      Dr. Steve  4. SYMPTOMS: \"Do you have any symptoms?\" If Yes, ask: \"What symptoms are you having?\"  \"How bad are the symptoms (e.g., mild, moderate, severe)      Dry mouth, loss of voice and patient reports induced cough from dryness      Patient wants to keep taking this medication. This writer told patient about OTC meds for dry mouth that she can utilize and suggested increasing water intake and chewing gum to help alleviate the dry mouth. Routing to provider to see if there are any other recommendations    "

## 2024-05-30 NOTE — TELEPHONE ENCOUNTER
Patient has lost her voice, she is not sick in anyway but is concerned it could be from the new med. She is wondering if someone could give her a call or if she could be worked in to see someone. Thanks!  Nadine Banks on 5/30/2024 at 9:27 AM

## 2024-05-30 NOTE — PROGRESS NOTES
Lyn is here today for MNT related to obesity and desired wt loss.     PCP: Dr Narcisa Steve    Lyn recently started on phentermine, about 1.5 weeks ago. She has noticed a decreased appetite, feeling the desire to snack much less. She has lost her voice, which was a reported side effect. Her goal is to lose weight down to ~160-170#. She has not been tracking her calorie intakes, but has cut back on portions and sweetened beverages. Obesity runs in her family.     Her Body mass index is 37.31 kg/m .    She does spend a fair amount of time outside. Discussed HIIT exercises. Discussed increasing her activity.     Reviewed her typical intakes.     Dx:   Encounter Diagnosis   Name Primary?    Class 2 severe obesity with serious comorbidity and body mass index (BMI) of 38.0 to 38.9 in adult, unspecified obesity type (H)      Diet: 4432-6007 kcal, 58-73 g protein per day.     Education today: discussed keeping a food log for the next 3 days to establish an average of what she is taking in. She will make changes as needed to more align with estimated needs.     Materials provided: weight management flyer, Mediterranean lifestyle recommendations, sample meal plans and recipes.     Goals/plan: Lyn will keep a food log for 3 days. She will establish realistic goals to make changes more in line with her estimated needs for slow wt loss (1-2#/week). She will increase her activity.     She stated understanding of goal/plan. She had no questions currently.     Time spent: 30 min    Kristine Whitt RD on 5/30/2024 at 9:33 AM

## 2024-06-04 ENCOUNTER — TELEPHONE (OUTPATIENT)
Dept: FAMILY MEDICINE | Facility: OTHER | Age: 24
End: 2024-06-04
Payer: COMMERCIAL

## 2024-06-04 NOTE — TELEPHONE ENCOUNTER
We received a fax from [employer] on Behalf of [facility] Denying the RX PA Request that was submitted for Phentermine HCL 15 MG Capsules stating that weight loss drugs are not covered under this patients pharmacy benefit.      For an Appeal,  Call the Customer Service number on the back of the patients ID card.  OR   By writing to:  [facility]  Attention: [employer] [facility]  [address]  [place] [address]    I faxed the Denial/Appeal information to [facility] to scan into the chart.    Sandhya Johnson on 6/4/2024 at 4:26 PM     99

## 2024-06-12 ENCOUNTER — MYC REFILL (OUTPATIENT)
Dept: FAMILY MEDICINE | Facility: OTHER | Age: 24
End: 2024-06-12
Payer: COMMERCIAL

## 2024-06-12 DIAGNOSIS — E66.01 CLASS 2 SEVERE OBESITY WITH SERIOUS COMORBIDITY AND BODY MASS INDEX (BMI) OF 38.0 TO 38.9 IN ADULT, UNSPECIFIED OBESITY TYPE (H): ICD-10-CM

## 2024-06-12 DIAGNOSIS — E66.812 CLASS 2 SEVERE OBESITY WITH SERIOUS COMORBIDITY AND BODY MASS INDEX (BMI) OF 38.0 TO 38.9 IN ADULT, UNSPECIFIED OBESITY TYPE (H): ICD-10-CM

## 2024-06-12 RX ORDER — PHENTERMINE HYDROCHLORIDE 15 MG/1
15 CAPSULE ORAL EVERY MORNING
Qty: 30 CAPSULE | Refills: 2 | Status: CANCELLED | OUTPATIENT
Start: 2024-06-12

## 2024-06-12 NOTE — TELEPHONE ENCOUNTER
Patient comment: Hi! I was wondering if I could get a renewal for this med. I know Dr. Pop Jeffers wanted me to follow up, but I have a physical with her July 1st. Everything is good. I m losing some weight and my voice has come back for the most part. No weird side effects and I am feeling good! I only have 3 pills left. I forgot to message earlier this week.     phentermine 15 MG capsule 30 capsule 2 5/9/2024 -- No   Sig - Route: Take 1 capsule (15 mg) by mouth every morning - Oral   Sent to pharmacy as: Phentermine HCl 15 MG Oral Capsule   Class: E-Prescribe   Order: [id number]   E-Prescribing Status: Receipt confirmed by pharmacy (5/9/2024  6:13 PM CDT)     [id number] - [place], [place] - [address]     Pt informed of prescription via [employer]. Pamela Sands RN .............. 6/12/2024  3:17 PM

## 2024-06-13 DIAGNOSIS — I10 PRIMARY HYPERTENSION: ICD-10-CM

## 2024-06-14 RX ORDER — LISINOPRIL 20 MG/1
20 TABLET ORAL DAILY
Qty: 90 TABLET | Refills: 4 | Status: SHIPPED | OUTPATIENT
Start: 2024-06-14 | End: 2024-09-24

## 2024-06-14 NOTE — TELEPHONE ENCOUNTER
Left message to call back. Closing note as multiple messages to return call have been left with patient.  Dodie Krishnamurthy LPN....................  6/14/2024   1:22 PM

## 2024-06-14 NOTE — TELEPHONE ENCOUNTER
[id number] in [employer] [facility] [place] sent Rx request for the following:      Requested Prescriptions   Pending Prescriptions Disp Refills    lisinopril (ZESTRIL) 20 MG tablet [Pharmacy Med Name: LISINOPRIL 20 MG TABLET] 90 tablet 3     Sig: TAKE 1 TABLET BY MOUTH EVERY DAY          Last Prescription Date:   6/5/23  Last Fill Qty/Refills:         90, R-3    Last Office Visit:              5/30/24   Future Office visit:           7/1/24    Prescription approved per [facility] Refill Protocol.    ASHLEY IBARRA RN on 6/14/2024 at 2:43 PM

## 2024-06-17 ENCOUNTER — MYC MEDICAL ADVICE (OUTPATIENT)
Dept: FAMILY MEDICINE | Facility: OTHER | Age: 24
End: 2024-06-17
Payer: COMMERCIAL

## 2024-06-26 SDOH — HEALTH STABILITY: PHYSICAL HEALTH: ON AVERAGE, HOW MANY DAYS PER WEEK DO YOU ENGAGE IN MODERATE TO STRENUOUS EXERCISE (LIKE A BRISK WALK)?: 5 DAYS

## 2024-06-26 SDOH — HEALTH STABILITY: PHYSICAL HEALTH: ON AVERAGE, HOW MANY MINUTES DO YOU ENGAGE IN EXERCISE AT THIS LEVEL?: 150+ MIN

## 2024-06-26 ASSESSMENT — SOCIAL DETERMINANTS OF HEALTH (SDOH): HOW OFTEN DO YOU GET TOGETHER WITH FRIENDS OR RELATIVES?: TWICE A WEEK

## 2024-07-01 ENCOUNTER — OFFICE VISIT (OUTPATIENT)
Dept: FAMILY MEDICINE | Facility: OTHER | Age: 24
End: 2024-07-01
Attending: FAMILY MEDICINE
Payer: COMMERCIAL

## 2024-07-01 VITALS
OXYGEN SATURATION: 95 % | HEIGHT: 65 IN | WEIGHT: 223.4 LBS | DIASTOLIC BLOOD PRESSURE: 80 MMHG | TEMPERATURE: 97.4 F | HEART RATE: 79 BPM | RESPIRATION RATE: 16 BRPM | SYSTOLIC BLOOD PRESSURE: 124 MMHG | BODY MASS INDEX: 37.22 KG/M2

## 2024-07-01 DIAGNOSIS — Z12.4 SCREENING FOR CERVICAL CANCER: ICD-10-CM

## 2024-07-01 DIAGNOSIS — Z00.00 ROUTINE GENERAL MEDICAL EXAMINATION AT A HEALTH CARE FACILITY: Primary | ICD-10-CM

## 2024-07-01 DIAGNOSIS — J30.89 PERENNIAL ALLERGIC RHINITIS: ICD-10-CM

## 2024-07-01 DIAGNOSIS — R11.2 NAUSEA AND VOMITING, UNSPECIFIED VOMITING TYPE: ICD-10-CM

## 2024-07-01 DIAGNOSIS — E66.812 CLASS 2 SEVERE OBESITY WITH SERIOUS COMORBIDITY AND BODY MASS INDEX (BMI) OF 38.0 TO 38.9 IN ADULT, UNSPECIFIED OBESITY TYPE (H): ICD-10-CM

## 2024-07-01 DIAGNOSIS — J45.30 MILD PERSISTENT ASTHMA WITHOUT COMPLICATION: ICD-10-CM

## 2024-07-01 DIAGNOSIS — F33.1 MODERATE EPISODE OF RECURRENT MAJOR DEPRESSIVE DISORDER (H): ICD-10-CM

## 2024-07-01 DIAGNOSIS — E66.01 CLASS 2 SEVERE OBESITY WITH SERIOUS COMORBIDITY AND BODY MASS INDEX (BMI) OF 38.0 TO 38.9 IN ADULT, UNSPECIFIED OBESITY TYPE (H): ICD-10-CM

## 2024-07-01 PROCEDURE — 99395 PREV VISIT EST AGE 18-39: CPT | Performed by: FAMILY MEDICINE

## 2024-07-01 PROCEDURE — G0123 SCREEN CERV/VAG THIN LAYER: HCPCS | Performed by: FAMILY MEDICINE

## 2024-07-01 PROCEDURE — 99213 OFFICE O/P EST LOW 20 MIN: CPT | Mod: 25 | Performed by: FAMILY MEDICINE

## 2024-07-01 PROCEDURE — 87624 HPV HI-RISK TYP POOLED RSLT: CPT | Mod: ZL | Performed by: FAMILY MEDICINE

## 2024-07-01 RX ORDER — ALBUTEROL SULFATE 90 UG/1
2 AEROSOL, METERED RESPIRATORY (INHALATION) EVERY 4 HOURS PRN
Qty: 18 G | Refills: 11 | Status: SHIPPED | OUTPATIENT
Start: 2024-07-01

## 2024-07-01 RX ORDER — PHENTERMINE HYDROCHLORIDE 30 MG/1
30 CAPSULE ORAL EVERY MORNING
Qty: 30 CAPSULE | Refills: 5 | Status: SHIPPED | OUTPATIENT
Start: 2024-07-01

## 2024-07-01 RX ORDER — CETIRIZINE HYDROCHLORIDE 10 MG/1
TABLET ORAL
Qty: 60 TABLET | Refills: 11 | Status: SHIPPED | OUTPATIENT
Start: 2024-07-01

## 2024-07-01 RX ORDER — PROCHLORPERAZINE MALEATE 5 MG
5 TABLET ORAL EVERY 6 HOURS PRN
Qty: 20 TABLET | Refills: 5 | Status: SHIPPED | OUTPATIENT
Start: 2024-07-01

## 2024-07-01 RX ORDER — ESCITALOPRAM OXALATE 10 MG/1
10 TABLET ORAL DAILY
Qty: 90 TABLET | Refills: 3 | Status: SHIPPED | OUTPATIENT
Start: 2024-07-01

## 2024-07-01 ASSESSMENT — PAIN SCALES - GENERAL: PAINLEVEL: NO PAIN (0)

## 2024-07-01 ASSESSMENT — PATIENT HEALTH QUESTIONNAIRE - PHQ9: SUM OF ALL RESPONSES TO PHQ QUESTIONS 1-9: 0

## 2024-07-01 NOTE — PROGRESS NOTES
Preventive Care Visit  [facility] [place]  Narcisa Steve MD, Family Medicine  Jul 1, 2024          Anabel Nicholson is a 23 year old, presenting for the following:  Physical        7/1/2024     9:07 AM   Additional Questions   Roomed by Ronel Conner        Health Care Directive  Patient does not have a Health Care Directive or Living Will: Discussed advance care planning with patient; however, patient declined at this time.    SHELDON Nicholson is here today for a physical.  She has been doing well and has no concerns at this time.  She has not yet had a Pap Smear and is due for that.  She declines chlamydia testing as she is  and has no concerns regarding exposure.          6/26/2024   General Health   How would you rate your overall physical health? Good   Feel stress (tense, anxious, or unable to sleep) Not at all            6/26/2024   Nutrition   Three or more servings of calcium each day? Yes   Diet: Regular (no restrictions)   How many servings of fruit and vegetables per day? (!) 0-1   How many sweetened beverages each day? 0-1            6/26/2024   Exercise   Days per week of moderate/strenous exercise 5 days   Average minutes spent exercising at this level 150+ min            6/26/2024   Social Factors   Frequency of gathering with friends or relatives Twice a week   Worry food won't last until get money to buy more No   Food not last or not have enough money for food? No   Do you have housing? (Housing is defined as stable permanent housing and does not include staying ouside in a car, in a tent, in an abandoned building, in an overnight shelter, or couch-surfing.) Yes   Are you worried about losing your housing? No   Lack of transportation? No   Unable to get utilities (heat,electricity)? No            6/26/2024   Dental   Dentist two times every year? Yes            6/26/2024   TB Screening   Were you born outside of the [place]? No          Today's PHQ-9 Score:       7/1/2024      9:09 AM   PHQ-9 SCORE   PHQ-9 Total Score 0         6/26/2024   Substance Use   Alcohol more than 3/day or more than 7/wk Not Applicable   Do you use any other substances recreationally? No        Social History     Tobacco Use    Smoking status: Never     Passive exposure: Never    Smokeless tobacco: Never    Tobacco comments:     Quit smoking: Mom smokes outside   Vaping Use    Vaping status: Never Used   Substance Use Topics    Alcohol use: No    Drug use: Never     Comment: Drug use: No           6/26/2024   STI Screening   New sexual partner(s) since last STI/HIV test? No        History of abnormal Pap smear: No - age 21-29 PAP every 3 years recommended        10/26/2023     9:57 AM 2/26/2024     4:55 PM 4/16/2024     3:45 PM   ACT Total Scores   ACT TOTAL SCORE (Goal Greater than or Equal to 20) 17 6 18   In the past 12 months, how many times did you visit the emergency room for your asthma without being admitted to the hospital? 0 0 0   In the past 12 months, how many times were you hospitalized overnight because of your asthma? 0 0 0                6/26/2024   Contraception/Family Planning   Questions about contraception or family planning No           Reviewed and updated as needed this visit by Provider                    Past Medical History:   Diagnosis Date    Multiple food allergies 7/1/2023    Wheat (low), scallops (low), shrimp (moderate), cow's milk (moderate), egg white (moderate).    Spondylolysis of lumbar region     No Comments Provided     Past Surgical History:   Procedure Laterality Date    COLONOSCOPY  03/18/2024    normal - [facility]    EGD  03/18/2024    due to chronic nausea - normal - [facility]    ENDOSCOPIC SINUS SURGERY N/A 10/31/2023    Procedure: Endoscopic Sinus Surgery;  Surgeon: Lalita Hewitt MD;  Location: [facility] OR    Lumbar fusion of L5  8/2/3017    subsequent infection    SEPTOPLASTY, TURBINOPLASTY, COMBINED Bilateral 10/31/2023    Procedure: Septoplasty, Turbinate  Reduction, Excision Right Vicenta Bullosa;  Surgeon: Lalita Hewitt MD;  Location: [facility] OR     BP Readings from Last 3 Encounters:   07/01/24 124/80   04/23/24 116/74   02/27/24 120/80    Wt Readings from Last 3 Encounters:   07/01/24 101.3 kg (223 lb 6.4 oz)   05/30/24 101.7 kg (224 lb 3.2 oz)   04/23/24 104.1 kg (229 lb 9.6 oz)                  Patient Active Problem List   Diagnosis    Acne    Menorrhagia with irregular cycle    Spondylolysis of lumbar region    Mild persistent asthma without complication    Migraine without aura and without status migrainosus, not intractable    Congestion of paranasal sinus    Multiple food allergies    Anxiety and depression     Past Surgical History:   Procedure Laterality Date    COLONOSCOPY  03/18/2024    normal - [facility]    EGD  03/18/2024    due to chronic nausea - normal - [facility]    ENDOSCOPIC SINUS SURGERY N/A 10/31/2023    Procedure: Endoscopic Sinus Surgery;  Surgeon: Lalita Hewitt MD;  Location: [facility]    Lumbar fusion of L5  8/2/3017    subsequent infection    SEPTOPLASTY, TURBINOPLASTY, COMBINED Bilateral 10/31/2023    Procedure: Septoplasty, Turbinate Reduction, Excision Right Vicenta Bullosa;  Surgeon: Lalita Hewitt MD;  Location: [facility] OR       Social History     Tobacco Use    Smoking status: Never     Passive exposure: Never    Smokeless tobacco: Never    Tobacco comments:     Quit smoking: Mom smokes outside   Substance Use Topics    Alcohol use: No     Family History   Problem Relation Age of Onset    Hypertension Mother         Hypertension,Borderline    Hypertension Father         Hypertension    Hyperlipidemia Father         Hyperlipidemia    Hypertension Maternal Grandmother         Hypertension    Hypertension Maternal Grandfather         Hypertension    Hypertension Paternal Grandfather         Hypertension    Heart Disease Paternal Grandfather         Heart Disease,smoker         Current Outpatient Medications   Medication Sig  Dispense Refill    acetaminophen (TYLENOL) 325 MG tablet Take 650 mg by mouth      albuterol (PROAIR HFA/PROVENTIL HFA/VENTOLIN HFA) 108 (90 Base) MCG/ACT inhaler Inhale 2 puffs into the lungs every 4 hours as needed for shortness of breath or wheezing 18 g 11    cetirizine (ZYRTEC) 10 MG tablet Take 10 mg every evening 60 tablet 11    EPINEPHrine (ANY BX GENERIC EQUIV) 0.3 MG/0.3ML injection 2-pack Inject 0.3 mLs (0.3 mg) into the muscle as needed for anaphylaxis May repeat one time in 5-15 minutes if response to initial dose is inadequate. 2 each 1    escitalopram (LEXAPRO) 10 MG tablet Take 1 tablet (10 mg) by mouth daily 90 tablet 3    fluticasone (FLOVENT HFA) 110 MCG/ACT inhaler Inhale 1 puff into the lungs 2 times daily 12 g 11    lisinopril (ZESTRIL) 20 MG tablet TAKE 1 TABLET BY MOUTH EVERY DAY 90 tablet 4    phentermine 30 MG capsule Take 1 capsule (30 mg) by mouth every morning 30 capsule 5    prochlorperazine (COMPAZINE) 5 MG tablet Take 1 tablet (5 mg) by mouth every 6 hours as needed for nausea or vomiting 20 tablet 5    rizatriptan (MAXALT-MLT) 5 MG ODT Take 1 tablet (5 mg) by mouth at onset of headache for migraine May repeat in 2 hours. Max 6 tablets/24 hours. 20 tablet 11     Allergies   Allergen Reactions    Azithromycin Other (See Comments)     Numbness in the right side, neurological changes.     Amoxicillin Nausea and Vomiting     Tolerates cephalexin    Cow's Milk [Milk (Cow)]     Doxycycline Nausea and Vomiting    Egg White (Egg Protein)     Prozac [Fluoxetine] Dizziness and Nausea and Vomiting    Rifampin Nausea and Vomiting    Shrimp      Also allergic to scallops    Wheat Extract     Hydrocodone Nausea     Vomiting    Seasonal Allergies Other (See Comments)     Dust, pollen    Wheat Germ Oil Other (See Comments)     Dust, pollen         Review of Systems  Constitutional, HEENT, cardiovascular, pulmonary, GI, , musculoskeletal, neuro, skin, endocrine and psych systems are negative, except  "as otherwise noted.     Objective    Exam  /80   Pulse 79   Temp 97.4  F (36.3  C) (Tympanic)   Resp 16   Ht 1.651 m (5' 5\")   Wt 101.3 kg (223 lb 6.4 oz)   LMP 06/20/2024 (Approximate)   SpO2 95%   Breastfeeding No   BMI 37.18 kg/m     Estimated body mass index is 37.18 kg/m  as calculated from the following:    Height as of this encounter: 1.651 m (5' 5\").    Weight as of this encounter: 101.3 kg (223 lb 6.4 oz).    Physical Exam  Constitutional:       General: She is not in acute distress.     Appearance: She is well-developed.   HENT:      Head: Normocephalic.      Right Ear: Tympanic membrane and external ear normal.      Left Ear: Tympanic membrane and external ear normal.      Nose: Nose normal.      Mouth/Throat:      Pharynx: No oropharyngeal exudate.   Eyes:      General:         Right eye: No discharge.         Left eye: No discharge.      Conjunctiva/sclera: Conjunctivae normal.      Pupils: Pupils are equal, round, and reactive to light.   Neck:      Thyroid: No thyromegaly.      Trachea: No tracheal deviation.   Cardiovascular:      Rate and Rhythm: Normal rate and regular rhythm.      Pulses: Normal pulses.      Heart sounds: Normal heart sounds, S1 normal and S2 normal. No murmur heard.     No friction rub. No gallop. No S3 or S4 sounds.   Pulmonary:      Effort: Pulmonary effort is normal. No respiratory distress.      Breath sounds: Normal breath sounds. No wheezing or rales.      Comments: Breast exam:  No masses palpable bilaterally.  No skin changes, tethering or axillary lymphadenopathy bilaterally.    Abdominal:      General: Bowel sounds are normal. There is no distension.      Palpations: Abdomen is soft. There is no mass.      Tenderness: There is no abdominal tenderness.   Genitourinary:     Comments: Pelvic Exam:  Vulva: No external lesions, normal hair distribution, no adenopathy  Vagina: Moist, pink, no abnormal discharge, well rugated, no lesions  Cervix: Pap smear " obtained.  Cervix is parous, smooth, pink, no visible lesions  Uterus: Normal size, anteverted, non-tender, mobile  Ovaries: No mass, non-tender, mobile  Musculoskeletal:         General: Normal range of motion.      Cervical back: Neck supple.   Lymphadenopathy:      Cervical: No cervical adenopathy.   Skin:     General: Skin is warm and dry.      Findings: No rash.   Neurological:      Mental Status: She is alert and oriented to person, place, and time.      Motor: No abnormal muscle tone.      Deep Tendon Reflexes: Reflexes are normal and symmetric.   Psychiatric:         Thought Content: Thought content normal.         Judgment: Judgment normal.               ICD-10-CM    1. Routine general medical examination at a health care facility  Z00.00       2. Screening for cervical cancer  Z12.4 Gynecologic Cytology (Pap) and HPV      3. Class 2 severe obesity with serious comorbidity and body mass index (BMI) of 38.0 to 38.9 in adult, unspecified obesity type (H)  E66.01 phentermine 30 MG capsule    Z68.38       4. Mild persistent asthma without complication  J45.30 albuterol (PROAIR HFA/PROVENTIL HFA/VENTOLIN HFA) 108 (90 Base) MCG/ACT inhaler      5. Perennial allergic rhinitis  J30.89 cetirizine (ZYRTEC) 10 MG tablet      6. Moderate episode of recurrent major depressive disorder (H)  F33.1 escitalopram (LEXAPRO) 10 MG tablet      7. Nausea and vomiting, unspecified vomiting type  R11.2 prochlorperazine (COMPAZINE) 5 MG tablet           Pap Smear completed today.  This is her first.  Low risk for chlamydia, so she deferred this.  Tdap is up to date (8/2/19).  Declines other labs at this time.  See #1.  She would like to increase her phentermine dose slightly to 30 mg daily.  New prescription written.  Stable.  Asthma Action Plan updated today.  Refill as above.   Stable.  Refill of zyrtec as above.  Stable.  Lexapro refilled.  Stable.  Compazine refilled.  She uses this for motion sickness or migraines.    Return  in about 53 weeks (around 7/7/2025) for Annual Wellness Visit.           Narcisa Steve MD

## 2024-07-01 NOTE — PATIENT INSTRUCTIONS
"Patient Education   Preventive Care Advice   This is general advice we often give to help people stay healthy. Your care team may have specific advice just for you. Please talk to your care team about your own preventive care needs.  Lifestyle  Exercise at least 150 minutes each week (30 minutes a day, 5 days a week).  Do muscle strengthening activities 2 days a week. These help control your weight and prevent disease.  No smoking.  Wear sunscreen to prevent skin cancer.  Have your home tested for radon every 2 to 5 years. Radon is a colorless, odorless gas that can harm your lungs. To learn more, go to www.health.[facility].[place].us and search for \"Radon in Homes.\"  Keep guns unloaded and locked up in a safe place like a safe or gun vault, or, use a gun lock and hide the keys. Always lock away bullets separately. To learn more, visit [place].gov and search for \"safe gun storage.\"  Nutrition  Eat 5 or more servings of fruits and vegetables each day.  Try wheat bread, brown rice and whole grain pasta (instead of white bread, rice, and pasta).  Get enough calcium and vitamin D. Check the label on foods and aim for 100% of the RDA (recommended daily allowance).  Regular exams  Have a dental exam and cleaning every 6 months.  See your health care team every year to talk about:  Any changes in your health.  Any medicines your care team has prescribed.  Preventive care, family planning, and ways to prevent chronic diseases.  Shots (vaccines)   HPV shots (up to age 26), if you've never had them before.  Hepatitis B shots (up to age 59), if you've never had them before.  COVID-19 shot: Get this shot when it's due.  Flu shot: Get a flu shot every year.  Tetanus shot: Get a tetanus shot every 10 years.  Pneumococcal, hepatitis A, and RSV shots: Ask your care team if you need these based on your risk.  Shingles shot (for age 50 and up).  General health tests  Diabetes screening:  Starting at age 35, Get screened for diabetes at least " every 3 years.  If you are younger than age 35, ask your care team if you should be screened for diabetes.  Cholesterol test: At age 39, start having a cholesterol test every 5 years, or more often if advised.  Bone density scan (DEXA): At age 50, ask your care team if you should have this scan for osteoporosis (brittle bones).  Hepatitis C: Get tested at least once in your life.  Abdominal aortic aneurysm screening: Talk to your doctor about having this screening if you:  Have ever smoked; and  Are biologically male; and  Are between the ages of 65 and 75.  STIs (sexually transmitted infections)  Before age 24: Ask your care team if you should be screened for STIs.  After age 24: Get screened for STIs if you're at risk. You are at risk for STIs (including HIV) if:  You are sexually active with more than one person.  You don't use condoms every time.  You or a partner was diagnosed with a sexually transmitted infection.  If you are at risk for HIV, ask about PrEP medicine to prevent HIV.  Get tested for HIV at least once in your life, whether you are at risk for HIV or not.  Cancer screening tests  Cervical cancer screening: If you have a cervix, begin getting regular cervical cancer screening tests at age 21. Most people who have regular screenings with normal results can stop after age 65. Talk about this with your provider.  Breast cancer scan (mammogram): If you've ever had breasts, begin having regular mammograms starting at age 40. This is a scan to check for breast cancer.  Colon cancer screening: It is important to start screening for colon cancer at age 45.  Have a colonoscopy test every 10 years (or more often if you're at risk) Or, ask your provider about stool tests like a FIT test every year or Cologuard test every 3 years.  To learn more about your testing options, visit: www.[employer]/937076.pdf.  For help making a decision, visit: medhat/ny93774.  Prostate cancer screening test: If you have a  prostate and are age 55 to 69, ask your provider if you would benefit from a yearly prostate cancer screening test.  Lung cancer screening: If you are a current or former smoker age 50 to 80, ask your care team if ongoing lung cancer screenings are right for you.  For informational purposes only. Not to replace the advice of your health care provider. Copyright   2023 [facility]. All rights reserved. Clinically reviewed by the [facility]itions Program. [employer] [id number] - REV 04/24.

## 2024-07-01 NOTE — NURSING NOTE
Chief Complaint   Patient presents with    Physical         Medication Reconciliation: complete    Ronel Conner, LPN

## 2024-07-01 NOTE — LETTER
My Asthma Action Plan    Name: Lyn Villaseñor   YOB: 2000  Date: 7/1/2024   My doctor: Narcisa Steve MD   My clinic: [facility] [place]        My Control Medicine: Fluticasone propionate (Flovent HFA) - 110 mcg 1 puff twice daily   My Rescue Medicine: Albuterol (Proair/Ventolin/Proventil HFA) 2-4 puffs EVERY 4 HOURS as needed. Use a spacer if recommended by your provider.   My Asthma Severity:   Mild Persistent  Know your asthma triggers: Patient is unaware of triggers               GREEN ZONE   Good Control  I feel good  No cough or wheeze  Can work, sleep and play without asthma symptoms       Take your asthma control medicine every day.     If exercise triggers your asthma, take your rescue medication  15 minutes before exercise or sports, and  During exercise if you have asthma symptoms  Spacer to use with inhaler: If you have a spacer, make sure to use it with your inhaler             YELLOW ZONE Getting Worse  I have ANY of these:  I do not feel good  Cough or wheeze  Chest feels tight  Wake up at night   Keep taking your Green Zone medications  Start taking your rescue medicine:  every 20 minutes for up to 1 hour. Then every 4 hours for 24-48 hours.  If you stay in the Yellow Zone for more than 12-24 hours, contact your doctor.  If you do not return to the Green Zone in 12-24 hours or you get worse, start taking your oral steroid medicine if prescribed by your provider.           RED ZONE Medical Alert - Get Help  I have ANY of these:  I feel awful  Medicine is not helping  Breathing getting harder  Trouble walking or talking  Nose opens wide to breathe       Take your rescue medicine NOW  If your provider has prescribed an oral steroid medicine, start taking it NOW  Call your doctor NOW  If you are still in the Red Zone after 20 minutes and you have not reached your doctor:  Take your rescue medicine again and  Call 911 or go to the emergency room right  away    See your regular doctor within 2 weeks of an Emergency Room or Urgent Care visit for follow-up treatment.          Annual Reminders:  Meet with Asthma Educator,  Flu Shot in the Fall, consider Pneumonia Vaccination for patients with asthma (aged 19 and older).    Pharmacy: [id number] - [place], [place] - [address]    Electronically signed by Narcisa Steve MD   Date: 07/01/24                      Asthma Triggers  How To Control Things That Make Your Asthma Worse    Triggers are things that make your asthma worse.  Look at the list below to help you find your triggers and what you can do about them.  You can help prevent asthma flare-ups by staying away from your triggers.      Trigger                                                          What you can do   Cigarette Smoke  Tobacco smoke can make asthma worse. Do not allow smoking in your home, car or around you.  Be sure no one smokes at a child s day care or school.  If you smoke, ask your health care provider for ways to help you quit.  Ask family members to quit too.  Ask your health care provider for a referral to Quit Plan to help you quit smoking, or call 4-796-165-PLAN.     Colds, Flu, Bronchitis  These are common triggers of asthma. Wash your hands often.  Don t touch your eyes, nose or mouth.  Get a flu shot every year.     Dust Mites  These are tiny bugs that live in cloth or carpet. They are too small to see. Wash sheets and blankets in hot water every week.   Encase pillows and mattress in dust mite proof covers.  Avoid having carpet if you can. If you have carpet, vacuum weekly.   Use a dust mask and HEPA vacuum.   Pollen and Outdoor Mold  Some people are allergic to trees, grass, or weed pollen, or molds. Try to keep your windows closed.  Limit time out doors when pollen count is high.   Ask you health care provider about taking medicine during allergy season.     Animal Dander  Some people are allergic to  skin flakes, urine or saliva from pets with fur or feathers. Keep pets with fur or feathers out of your home.    If you can t keep the pet outdoors, then keep the pet out of your bedroom.  Keep the bedroom door closed.  Keep pets off cloth furniture and away from stuffed toys.     Mice, Rats, and Cockroaches   Some people are allergic to the waste from these pests.   Cover food and garbage.  Clean up spills and food crumbs.  Store grease in the refrigerator.   Keep food out of the bedroom.   Indoor Mold  This can be a trigger if your home has high moisture. Fix leaking faucets, pipes, or other sources of water.   Clean moldy surfaces.  Dehumidify basement if it is damp and smelly.   Smoke, Strong Odors, and Sprays  These can reduce air quality. Stay away from strong odors and sprays, such as perfume, powder, hair spray, paints, smoke incense, paint, cleaning products, candles and new carpet.   Exercise or Sports  Some people with asthma have this trigger. Be active!  Ask your doctor about taking medicine before sports or exercise to prevent symptoms.    Warm up for 5-10 minutes before and after sports or exercise.     Other Triggers of Asthma  Cold air:  Cover your nose and mouth with a scarf.  Sometimes laughing or crying can be a trigger.  Some medicines and food can trigger asthma.

## 2024-07-10 LAB
BKR LAB AP GYN ADEQUACY: NORMAL
BKR LAB AP GYN INTERPRETATION: NORMAL
BKR LAB AP LMP: NORMAL
BKR LAB AP PREVIOUS ABNORMAL: NORMAL
PATH REPORT.COMMENTS IMP SPEC: NORMAL
PATH REPORT.COMMENTS IMP SPEC: NORMAL
PATH REPORT.RELEVANT HX SPEC: NORMAL

## 2024-07-11 LAB
HPV HR 12 DNA CVX QL NAA+PROBE: NEGATIVE
HPV16 DNA CVX QL NAA+PROBE: NEGATIVE
HPV18 DNA CVX QL NAA+PROBE: NEGATIVE
HUMAN PAPILLOMA VIRUS FINAL DIAGNOSIS: NORMAL

## 2024-08-06 ENCOUNTER — MYC MEDICAL ADVICE (OUTPATIENT)
Dept: FAMILY MEDICINE | Facility: OTHER | Age: 24
End: 2024-08-06
Payer: COMMERCIAL

## 2024-08-06 DIAGNOSIS — G43.009 MIGRAINE WITHOUT AURA AND WITHOUT STATUS MIGRAINOSUS, NOT INTRACTABLE: ICD-10-CM

## 2024-08-08 DIAGNOSIS — G43.009 MIGRAINE WITHOUT AURA AND WITHOUT STATUS MIGRAINOSUS, NOT INTRACTABLE: ICD-10-CM

## 2024-08-08 RX ORDER — RIZATRIPTAN BENZOATE 5 MG/1
5 TABLET, ORALLY DISINTEGRATING ORAL
Qty: 20 TABLET | Refills: 11 | Status: SHIPPED | OUTPATIENT
Start: 2024-02-27 | End: 2024-08-08

## 2024-08-08 RX ORDER — RIZATRIPTAN BENZOATE 5 MG/1
5 TABLET, ORALLY DISINTEGRATING ORAL
Qty: 20 TABLET | Refills: 11 | Status: SHIPPED | OUTPATIENT
Start: 2024-08-08 | End: 2024-08-09

## 2024-08-08 NOTE — TELEPHONE ENCOUNTER
Maxalt moved from [facility] to [facility] per patient request.      Non-controlled substance.  Order date adjusted to ordered date.  Fulfilling signed order.    [facility] on [facility].    Aleta Boyle RN on 8/8/2024 at 7:52 AM

## 2024-08-08 NOTE — TELEPHONE ENCOUNTER
Fax sent from [facility] that in order for insurance to approve medication refill, they need the max number of headaches per month noted on the Rx.     Please review, update, and advise. Thank you.    Lauren Mendoza LPN on 8/8/2024 at 3:51 PM

## 2024-08-09 ENCOUNTER — HOSPITAL ENCOUNTER (EMERGENCY)
Facility: OTHER | Age: 24
Discharge: LEFT BEFORE DISCHARGE | End: 2024-08-09
Admitting: FAMILY MEDICINE
Payer: COMMERCIAL

## 2024-08-09 ENCOUNTER — MYC MEDICAL ADVICE (OUTPATIENT)
Dept: FAMILY MEDICINE | Facility: OTHER | Age: 24
End: 2024-08-09

## 2024-08-09 VITALS
TEMPERATURE: 98.8 F | SYSTOLIC BLOOD PRESSURE: 117 MMHG | BODY MASS INDEX: 34.99 KG/M2 | RESPIRATION RATE: 18 BRPM | WEIGHT: 210 LBS | HEIGHT: 65 IN | OXYGEN SATURATION: 98 % | HEART RATE: 90 BPM | DIASTOLIC BLOOD PRESSURE: 78 MMHG

## 2024-08-09 DIAGNOSIS — G43.009 MIGRAINE WITHOUT AURA AND WITHOUT STATUS MIGRAINOSUS, NOT INTRACTABLE: ICD-10-CM

## 2024-08-09 LAB
ATRIAL RATE - MUSE: 88 BPM
DIASTOLIC BLOOD PRESSURE - MUSE: NORMAL MMHG
INTERPRETATION ECG - MUSE: NORMAL
P AXIS - MUSE: 20 DEGREES
PR INTERVAL - MUSE: 156 MS
QRS DURATION - MUSE: 90 MS
QT - MUSE: 362 MS
QTC - MUSE: 438 MS
R AXIS - MUSE: 38 DEGREES
SYSTOLIC BLOOD PRESSURE - MUSE: NORMAL MMHG
T AXIS - MUSE: 10 DEGREES
VENTRICULAR RATE- MUSE: 88 BPM

## 2024-08-09 PROCEDURE — 93010 ELECTROCARDIOGRAM REPORT: CPT | Performed by: INTERNAL MEDICINE

## 2024-08-09 PROCEDURE — 93005 ELECTROCARDIOGRAM TRACING: CPT | Performed by: FAMILY MEDICINE

## 2024-08-09 PROCEDURE — 99281 EMR DPT VST MAYX REQ PHY/QHP: CPT | Performed by: FAMILY MEDICINE

## 2024-08-09 RX ORDER — RIZATRIPTAN BENZOATE 5 MG/1
5 TABLET, ORALLY DISINTEGRATING ORAL
Qty: 20 TABLET | Refills: 11 | Status: SHIPPED | OUTPATIENT
Start: 2024-08-09

## 2024-08-09 ASSESSMENT — COLUMBIA-SUICIDE SEVERITY RATING SCALE - C-SSRS
2. HAVE YOU ACTUALLY HAD ANY THOUGHTS OF KILLING YOURSELF IN THE PAST MONTH?: NO
1. IN THE PAST MONTH, HAVE YOU WISHED YOU WERE DEAD OR WISHED YOU COULD GO TO SLEEP AND NOT WAKE UP?: NO
6. HAVE YOU EVER DONE ANYTHING, STARTED TO DO ANYTHING, OR PREPARED TO DO ANYTHING TO END YOUR LIFE?: NO

## 2024-08-09 NOTE — TELEPHONE ENCOUNTER
[id number] sent Rx request for the following:      Requested Prescriptions   Pending Prescriptions Disp Refills    rizatriptan (MAXALT-MLT) 5 MG ODT 20 tablet 11     Sig: Take 1 tablet (5 mg) by mouth at onset of headache for migraine May repeat in 2 hours. Max 6 tablets/24 hours.       Serotonin Agonists Failed - 8/9/2024 12:47 PM        Failed - Serotonin Agonist request needs review.     Please review patient's record. If patient has had 8 or more treatments in the past month, please forward to provider.         Last Prescription Date:   8/8/24  Last Fill Qty/Refills:         20, R-11    Last Office Visit:              7/1/24   Future Office visit:           none    Routing refill request to provider for review/approval because:  Drug not on the G refill protocol     Mireille Cobian RN on 8/9/2024 at 4:14 PM

## 2024-08-09 NOTE — TELEPHONE ENCOUNTER
Insurance asking how many headache days a month. Left message to return call [phone]    Mireille Cobian RN on 8/9/2024 at 12:35 PM

## 2024-08-09 NOTE — ED TRIAGE NOTES
"Pt presents to ED via private car with c/o mid sternal chest pain that started on Wednesday. Pt states the pain is worse with movement, coughing, and when she pushes on her chest. Pt states for the last month her asthma has been terrible and she's been coughing a lot. /78   Pulse 90   Temp 98.8  F (37.1  C) (Tympanic)   Resp 18   Ht 1.651 m (5' 5\")   Wt 95.3 kg (210 lb)   LMP 06/20/2024 (Approximate)   SpO2 98%   BMI 34.95 kg/m         Triage Assessment (Adult)       Row Name 08/09/24 0930          Triage Assessment    Airway WDL WDL        Respiratory WDL    Respiratory WDL WDL        Skin Circulation/Temperature WDL    Skin Circulation/Temperature WDL WDL        Cardiac WDL    Cardiac WDL X;chest pain        Chest Pain Assessment    Chest Pain Location midsternal     Character sharp;stabbing     Precipitating Factors activity;physical exertion        Peripheral/Neurovascular WDL    Peripheral Neurovascular WDL WDL        Cognitive/Neuro/Behavioral WDL    Cognitive/Neuro/Behavioral WDL WDL                     "

## 2024-08-09 NOTE — TELEPHONE ENCOUNTER
Patient sent [employer] with response:    Hi. I just got a call from Mireille in [facility] regarding how many headaches a month a get. I can t call back at this moment but I d say at least 2-3 times a WEEK. So probably 10 migraines a month.     Aleta Boyle RN on 8/9/2024 at 12:43 PM

## 2024-08-12 NOTE — TELEPHONE ENCOUNTER
7/1/24  LOV with [facility] for physical.   8/9/24  ED for Chest pain.   EKG: Sinus rhythm.  Nonspecific T wave abnormality.  Abnormal ECG   When compared with ECG of 12-Jul-2023 12:25,   Nonspecific T wave abnormality now evident in Anterior leads     Aleta Boyle RN on 8/12/2024 at 7:44 AM

## 2024-08-14 ENCOUNTER — HOSPITAL ENCOUNTER (EMERGENCY)
Facility: HOSPITAL | Age: 24
Discharge: HOME | End: 2024-08-14
Attending: STUDENT IN AN ORGANIZED HEALTH CARE EDUCATION/TRAINING PROGRAM | Admitting: STUDENT IN AN ORGANIZED HEALTH CARE EDUCATION/TRAINING PROGRAM
Payer: COMMERCIAL

## 2024-08-14 ENCOUNTER — APPOINTMENT (OUTPATIENT)
Dept: GENERAL RADIOLOGY | Facility: HOSPITAL | Age: 24
End: 2024-08-14
Attending: STUDENT IN AN ORGANIZED HEALTH CARE EDUCATION/TRAINING PROGRAM
Payer: COMMERCIAL

## 2024-08-14 VITALS
RESPIRATION RATE: 16 BRPM | WEIGHT: 217.7 LBS | OXYGEN SATURATION: 97 % | BODY MASS INDEX: 36.23 KG/M2 | HEART RATE: 79 BPM | TEMPERATURE: 97.9 F | SYSTOLIC BLOOD PRESSURE: 109 MMHG | DIASTOLIC BLOOD PRESSURE: 72 MMHG

## 2024-08-14 DIAGNOSIS — R07.9 CHEST PAIN, UNSPECIFIED TYPE: ICD-10-CM

## 2024-08-14 LAB
ATRIAL RATE - MUSE: 80 BPM
DIASTOLIC BLOOD PRESSURE - MUSE: NORMAL MMHG
FLUAV RNA SPEC QL NAA+PROBE: NEGATIVE
FLUBV RNA RESP QL NAA+PROBE: NEGATIVE
HOLD SPECIMEN: NORMAL
INTERPRETATION ECG - MUSE: NORMAL
P AXIS - MUSE: 14 DEGREES
PR INTERVAL - MUSE: 164 MS
QRS DURATION - MUSE: 76 MS
QT - MUSE: 378 MS
QTC - MUSE: 435 MS
R AXIS - MUSE: 37 DEGREES
RSV RNA SPEC NAA+PROBE: NEGATIVE
SARS-COV-2 RNA RESP QL NAA+PROBE: NEGATIVE
SYSTOLIC BLOOD PRESSURE - MUSE: NORMAL MMHG
T AXIS - MUSE: 5 DEGREES
TROPONIN T SERPL HS-MCNC: <6 NG/L
VENTRICULAR RATE- MUSE: 80 BPM

## 2024-08-14 PROCEDURE — 93010 ELECTROCARDIOGRAM REPORT: CPT | Performed by: INTERNAL MEDICINE

## 2024-08-14 PROCEDURE — 99285 EMERGENCY DEPT VISIT HI MDM: CPT | Mod: 25

## 2024-08-14 PROCEDURE — 36415 COLL VENOUS BLD VENIPUNCTURE: CPT | Performed by: STUDENT IN AN ORGANIZED HEALTH CARE EDUCATION/TRAINING PROGRAM

## 2024-08-14 PROCEDURE — 87637 SARSCOV2&INF A&B&RSV AMP PRB: CPT | Performed by: STUDENT IN AN ORGANIZED HEALTH CARE EDUCATION/TRAINING PROGRAM

## 2024-08-14 PROCEDURE — 84484 ASSAY OF TROPONIN QUANT: CPT | Performed by: STUDENT IN AN ORGANIZED HEALTH CARE EDUCATION/TRAINING PROGRAM

## 2024-08-14 PROCEDURE — 99284 EMERGENCY DEPT VISIT MOD MDM: CPT | Performed by: STUDENT IN AN ORGANIZED HEALTH CARE EDUCATION/TRAINING PROGRAM

## 2024-08-14 PROCEDURE — 71046 X-RAY EXAM CHEST 2 VIEWS: CPT

## 2024-08-14 PROCEDURE — 93005 ELECTROCARDIOGRAM TRACING: CPT

## 2024-08-14 ASSESSMENT — COLUMBIA-SUICIDE SEVERITY RATING SCALE - C-SSRS
1. IN THE PAST MONTH, HAVE YOU WISHED YOU WERE DEAD OR WISHED YOU COULD GO TO SLEEP AND NOT WAKE UP?: NO
6. HAVE YOU EVER DONE ANYTHING, STARTED TO DO ANYTHING, OR PREPARED TO DO ANYTHING TO END YOUR LIFE?: NO
2. HAVE YOU ACTUALLY HAD ANY THOUGHTS OF KILLING YOURSELF IN THE PAST MONTH?: NO

## 2024-08-14 ASSESSMENT — ACTIVITIES OF DAILY LIVING (ADL): ADLS_ACUITY_SCORE: 37

## 2024-08-14 NOTE — DISCHARGE INSTRUCTIONS
- Please take Tylenol, Ibuprofen, and try ice and heat for your symptoms  - Please return to the Emergency Room if you do not improve, feel worse, or have any new or concerning symptoms. We would especially want to see you back if you experience worsening pain or shortness of breath  - Please follow up with a primary care physician in 4-5 days

## 2024-08-14 NOTE — ED TRIAGE NOTES
Patient reports chest pain x1 week. No known cause, denies injury. Started as nausea and discomfort in medial anterior chest, described as sharp. Notes increased pain with lifting arms above head. Pain has since moved to left anterior chest, still reports pain with movements. Area tender with palpation.     States she went to [place] ED, had EKG done but LWBS due to higher acuity patients.

## 2024-08-14 NOTE — ED PROVIDER NOTES
History     Chief Complaint   Patient presents with    Chest Wall Pain     HPI  Lyn Villaseñor is a 23 year old female who presents with chest pain. Symptoms have been ongoing for a week. At the beginning she also had some nausea, but this has partially resolved. No injury/trauma to the area. She notes the pain is worse when she lifts her arms above her head (hurt to wash her hair last night). Went to different ER several days ago, but left without being seen. No fevers. No abdominal pain. No vomiting. No leg swelling. No hx of CHF. No hx of PE/DVT.    Allergies:  Allergies   Allergen Reactions    Azithromycin Other (See Comments)     Numbness in the right side, neurological changes.     Amoxicillin Nausea and Vomiting     Tolerates cephalexin    Cow's Milk [Milk (Cow)]     Doxycycline Nausea and Vomiting    Egg White (Egg Protein)     Prozac [Fluoxetine] Dizziness and Nausea and Vomiting    Rifampin Nausea and Vomiting    Shrimp      Also allergic to scallops    Wheat Extract     Hydrocodone Nausea     Vomiting    Seasonal Allergies Other (See Comments)     Dust, pollen    Wheat Germ Oil Other (See Comments)     Dust, pollen       Problem List:    Patient Active Problem List    Diagnosis Date Noted    Anxiety and depression 07/28/2023     Priority: Medium    Multiple food allergies 07/01/2023     Priority: Medium     Wheat (low), scallops (low), shrimp (moderate), cow's milk (moderate), egg white (moderate).      Migraine without aura and without status migrainosus, not intractable 06/22/2023     Priority: Medium    Congestion of paranasal sinus 06/22/2023     Priority: Medium    Mild persistent asthma without complication 10/24/2022     Priority: Medium    Acne 02/10/2018     Priority: Medium    Menorrhagia with irregular cycle 02/10/2018     Priority: Medium    Spondylolysis of lumbar region 07/17/2017     Priority: Medium        Past Medical History:    Past Medical History:   Diagnosis Date    Multiple  food allergies 7/1/2023    Spondylolysis of lumbar region        Past Surgical History:    Past Surgical History:   Procedure Laterality Date    COLONOSCOPY  03/18/2024    normal - [facility]    EGD  03/18/2024    due to chronic nausea - normal - [facility]    ENDOSCOPIC SINUS SURGERY N/A 10/31/2023    Procedure: Endoscopic Sinus Surgery;  Surgeon: Lalita Hewitt MD;  Location: [facility] OR    Lumbar fusion of L5  8/2/3017    subsequent infection    SEPTOPLASTY, TURBINOPLASTY, COMBINED Bilateral 10/31/2023    Procedure: Septoplasty, Turbinate Reduction, Excision Right Vicenta Bullosa;  Surgeon: Lalita Hewitt MD;  Location: [facility] OR       Family History:    Family History   Problem Relation Age of Onset    Hypertension Mother         Hypertension,Borderline    Hypertension Father         Hypertension    Hyperlipidemia Father         Hyperlipidemia    Hypertension Maternal Grandmother         Hypertension    Hypertension Maternal Grandfather         Hypertension    Hypertension Paternal Grandfather         Hypertension    Heart Disease Paternal Grandfather         Heart Disease,smoker       Social History:  Marital Status:   [2]  Social History     Tobacco Use    Smoking status: Never     Passive exposure: Never    Smokeless tobacco: Never    Tobacco comments:     Quit smoking: Mom smokes outside   Vaping Use    Vaping status: Never Used   Substance Use Topics    Alcohol use: No    Drug use: Never     Comment: Drug use: No        Medications:    acetaminophen (TYLENOL) 325 MG tablet  albuterol (PROAIR HFA/PROVENTIL HFA/VENTOLIN HFA) 108 (90 Base) MCG/ACT inhaler  cetirizine (ZYRTEC) 10 MG tablet  EPINEPHrine (ANY BX GENERIC EQUIV) 0.3 MG/0.3ML injection 2-pack  escitalopram (LEXAPRO) 10 MG tablet  fluticasone (FLOVENT HFA) 110 MCG/ACT inhaler  lisinopril (ZESTRIL) 20 MG tablet  phentermine 30 MG capsule  prochlorperazine (COMPAZINE) 5 MG tablet  rizatriptan (MAXALT-MLT) 5 MG ODT          Review of Systems    All other systems reviewed and are negative.      Physical Exam   BP: 132/94  Pulse: 90  Temp: 97.9  F (36.6  C)  Resp: 18  Weight: 98.8 kg (217 lb 11.3 oz)  SpO2: 99 %      Physical Exam  Vitals and nursing note reviewed.   Constitutional:       General: She is not in acute distress.     Appearance: Normal appearance. She is not ill-appearing.   HENT:      Head: Normocephalic.      Right Ear: External ear normal.      Left Ear: External ear normal.      Nose: Nose normal.      Mouth/Throat:      Mouth: Mucous membranes are moist.   Eyes:      Extraocular Movements: Extraocular movements intact.      Pupils: Pupils are equal, round, and reactive to light.   Cardiovascular:      Rate and Rhythm: Normal rate and regular rhythm.      Pulses: Normal pulses.      Heart sounds: Normal heart sounds.   Pulmonary:      Effort: Pulmonary effort is normal.      Breath sounds: Normal breath sounds.   Chest:      Chest wall: Tenderness present.   Abdominal:      General: Abdomen is flat.      Palpations: Abdomen is soft.      Tenderness: There is no abdominal tenderness.   Musculoskeletal:         General: No tenderness. Normal range of motion.      Cervical back: Normal range of motion.   Skin:     General: Skin is warm and dry.      Capillary Refill: Capillary refill takes less than 2 seconds.   Neurological:      General: No focal deficit present.      Mental Status: She is alert and oriented to person, place, and time.   Psychiatric:         Mood and Affect: Mood normal.         ED Course     ED Course as of 08/14/24 0846   Wed Aug 14, 2024   0841 Work-up here reassuring. Suspect MSK pathology.   0841 Findings were discussed with the patient including non-emergent imaging/lab results. Additional verbal instructions were discussed with the patient as well. Instructed to follow up with a primary care provider within 4-5 days. Also discussed specific warning signs and instructed to return to the ED if there are any concerns.  Patient voiced understanding of instructions, questions were answered and the patient was discharged home in stable condition.     Procedures              EKG Interpretation:      Interpreted by NINO PAVON MD  Time reviewed: 0745  Symptoms at time of EKG: Chest Pain   Rhythm: normal sinus   Rate: normal  Axis: normal  Ectopy: none  Conduction: normal  ST Segments/ T Waves: No ST-T wave changes  Q Waves: none  Comparison to prior: Unchanged    Clinical Impression: normal EKG           Results for orders placed or performed during the hospital encounter of 08/14/24 (from the past 24 hour(s))   EKG 12 lead   Result Value Ref Range    Systolic Blood Pressure  mmHg    Diastolic Blood Pressure  mmHg    Ventricular Rate 80 BPM    Atrial Rate 80 BPM    AK Interval 164 ms    QRS Duration 76 ms     ms    QTc 435 ms    P Axis 14 degrees    R AXIS 37 degrees    T Axis 5 degrees    Interpretation ECG       Sinus rhythm  Normal ECG  When compared with ECG of 09-Aug-2024 09:35,  No significant change was found     Symptomatic Influenza A/B, RSV, & SARS-CoV2 PCR (COVID-19) Nose    Specimen: Nose; Swab   Result Value Ref Range    Influenza A PCR Negative Negative    Influenza B PCR Negative Negative    RSV PCR Negative Negative    SARS CoV2 PCR Negative Negative    Narrative    Testing was performed using the Xpert Xpress CoV2/Flu/RSV Assay on the [employer] GeneXpert Instrument. This test should be ordered for the detection of SARS-CoV-2, influenza, and RSV viruses in individuals who meet clinical and/or epidemiological criteria. Test performance is unknown in asymptomatic patients. This test is for in vitro diagnostic use under the FDA EUA for laboratories certified under CLIA to perform high or moderate complexity testing. This test has not been FDA cleared or approved. A negative result does not rule out the presence of PCR inhibitors in the specimen or target RNA in concentration below the limit of detection for the  assay. If only one viral target is positive but coinfection with multiple targets is suspected, the sample should be re-tested with another FDA cleared, approved, or authorized test, if coinfection would change clinical management. This test was validated by the [facility] [email or web address]. These laboratories are certified under the Clinical Laboratory Improvement Amendments of 1988 (CLIA-88) as qualified to perform high complexity laboratory testing.   Chest XR,  PA & LAT    Narrative    PROCEDURE:  XR CHEST 2 VIEWS    HISTORY:  Chest Pain.     COMPARISON:  None.    FINDINGS:   The cardiac silhouette is normal in size. The pulmonary vasculature is  normal.  The lungs are clear. No pleural effusion or pneumothorax.      Impression    IMPRESSION:  No acute cardiopulmonary disease.      ELIZABETH OSBORN MD         SYSTEM ID:  [id number]   Troponin T, High Sensitivity   Result Value Ref Range    Troponin T, High Sensitivity <6 <=14 ng/L   Extra Tube    Narrative    The following orders were created for panel order Extra Tube.  Procedure                               Abnormality         Status                     ---------                               -----------         ------                     Extra Blue Top Tube[[id number]]                              In process                 Extra Red Top Tube[[id number]]                               In process                 Extra Purple Top Tube[[id number]]                            In process                 Extra Heparinized Syringe[[id number]]                        In process                   Please view results for these tests on the individual orders.       Medications - No data to display    Assessments & Plan (with Medical Decision Making)     I have reviewed the nursing notes.    Atypical chest pain with some nausea for the past week. Hx and exam most consistent with MSK type chest pain. Given age and risk factors, not consistent with PE, CHF, Aortic Dissection.  Has very mild cough, but less consistent with pneumonia. Will obtain CXR to rule out pneumothorax. Given her generalized symptoms, will obtain covid swab as well. Plan on single troponin and likely discharge with PCP follow-up.    See ED Course.    I have reviewed the findings, diagnosis, plan and need for follow up with the patient.      New Prescriptions    No medications on file       Final diagnoses:   Chest pain, unspecified type       8/14/2024   [facility]       Jefferson Dee MD  08/14/24 0841       Jefferson Dee MD  08/14/24 0846

## 2024-09-24 DIAGNOSIS — I10 PRIMARY HYPERTENSION: ICD-10-CM

## 2024-09-24 RX ORDER — LISINOPRIL 20 MG/1
20 TABLET ORAL DAILY
Qty: 90 TABLET | Refills: 4 | Status: SHIPPED | OUTPATIENT
Start: 2024-09-24

## 2024-12-05 ENCOUNTER — OFFICE VISIT (OUTPATIENT)
Dept: FAMILY MEDICINE | Facility: OTHER | Age: 24
End: 2024-12-05
Attending: FAMILY MEDICINE
Payer: COMMERCIAL

## 2024-12-05 VITALS
DIASTOLIC BLOOD PRESSURE: 82 MMHG | HEART RATE: 74 BPM | OXYGEN SATURATION: 98 % | TEMPERATURE: 98.4 F | SYSTOLIC BLOOD PRESSURE: 120 MMHG | BODY MASS INDEX: 35.45 KG/M2 | WEIGHT: 213 LBS | RESPIRATION RATE: 16 BRPM

## 2024-12-05 DIAGNOSIS — I10 PRIMARY HYPERTENSION: ICD-10-CM

## 2024-12-05 DIAGNOSIS — Z32.01 POSITIVE PREGNANCY TEST: Primary | ICD-10-CM

## 2024-12-05 LAB — HCG UR QL: POSITIVE

## 2024-12-05 PROCEDURE — 81025 URINE PREGNANCY TEST: CPT | Mod: ZL | Performed by: FAMILY MEDICINE

## 2024-12-05 RX ORDER — NIFEDIPINE 30 MG/1
30 TABLET, EXTENDED RELEASE ORAL DAILY
Qty: 90 TABLET | Refills: 3 | Status: SHIPPED | OUTPATIENT
Start: 2024-12-05

## 2024-12-05 ASSESSMENT — PAIN SCALES - GENERAL: PAINLEVEL_OUTOF10: NO PAIN (0)

## 2024-12-05 NOTE — PROGRESS NOTES
Nursing Notes:   Ronel Conner LPN  12/5/2024  9:12 AM  Sign at exiting of workspace  Chief Complaint   Patient presents with    Pregnancy Test         Medication Reconciliation: complete    Ronel Conner LPN      Subjective   Lyn is a 24 year old, presenting for the following health issues:  Pregnancy Test        12/5/2024     9:10 AM   Additional Questions   Roomed by Ronel Ubaldo   Accompanied by sister- Roxy     Had 7 positive pregnancy tests at home on 11/28/24.  Had only been trying to conceive for about a month.  Last menstrual period was 11/1/24.  Has been taking a prenatal vitamin for the past 2 years.  She is also taking an extra folic acid 1 mg tablet daily.  Lyn has preexisting primary hypertension and has been on lisinopril.  She has been taking it right up until last night.  She has stopped lexapro and phentermine.  She is still on flovent and albuterol for her asthma.  She is also on zyrtec.  She has maxalt and compazine she takes for her migraines.  She has started having some nausea/vomiting already.      History of Present Illness       Reason for visit:  Would like to confirm pregnancy and go over meds list   She is taking medications regularly.         Positive at home pregnancy test         Review of Systems  Constitutional, HEENT, cardiovascular, pulmonary, GI, , musculoskeletal, neuro, skin, endocrine and psych systems are negative, except as otherwise noted.      Objective    /82   Pulse 74   Temp 98.4  F (36.9  C) (Tympanic)   Resp 16   Wt 96.6 kg (213 lb)   LMP 11/01/2024 (Exact Date)   SpO2 98%   Breastfeeding No   BMI 35.45 kg/m    Body mass index is 35.45 kg/m .  Physical Exam  Constitutional:       Appearance: Normal appearance.   HENT:      Head: Normocephalic.   Eyes:      Extraocular Movements: Extraocular movements intact.      Pupils: Pupils are equal, round, and reactive to light.   Cardiovascular:      Rate and Rhythm: Normal rate and regular  rhythm.      Heart sounds: Normal heart sounds. No murmur heard.  Pulmonary:      Effort: Pulmonary effort is normal.      Breath sounds: Normal breath sounds. No wheezing, rhonchi or rales.   Musculoskeletal:      Cervical back: Normal range of motion and neck supple.   Lymphadenopathy:      Cervical: No cervical adenopathy.   Neurological:      Mental Status: She is alert.   Psychiatric:         Mood and Affect: Mood normal.         Behavior: Behavior normal.        Results for orders placed or performed in visit on 12/05/24   Pregnancy, Urine (HCG)     Status: Abnormal   Result Value Ref Range    hCG Urine Qualitative Positive (A) Negative          ICD-10-CM    1. Positive pregnancy test  Z32.01 Pregnancy, Urine (HCG)     NIFEdipine ER OSMOTIC (PROCARDIA XL) 30 MG 24 hr tablet     Ob/Gyn  Referral     US OB < 14 Weeks Single      2. Primary hypertension  I10 NIFEdipine ER OSMOTIC (PROCARDIA XL) 30 MG 24 hr tablet           Her last menstrual period was 11/1/24, which puts her at 4w6d and gives her an BELGICA of 8/8/25.  Discussed stopping compazine as well.  May continue albuterol, flovent, zyrtec.  Recommended not taking compazine and only using maxalt if absolutely necessary.  She has already stopped lexapro and phentermine and would recommend transitioning off of lisinopril to a different blood pressure medication as well.  Due to her chronic hypertension, would recommend that she follow with OB GYN for her prenatal care.  Referral placed.  She is also referred for an early ultrasound to confirm dates/viability.  Stop lisinopril.  Start Procardia XL 30 mg daily.  If she is not able to get in to be seen by OB GYN in the next 2 weeks, recommended that she follow up with me in the meantime to recheck her blood pressure and titrate dose of procardia if needed.    Return in about 2 weeks (around 12/19/2024) for Hypertension - follow up after bp med change - ok to use same day/approval spot.     The  longitudinal plan of care for the diagnosis(es)/condition(s) as documented were addressed during this visit. Due to the added complexity in care, I will continue to support Lyn in the subsequent management and with ongoing continuity of care.      Narcisa Steve MD

## 2024-12-05 NOTE — NURSING NOTE
Chief Complaint   Patient presents with    Pregnancy Test         Medication Reconciliation: complete    Ronel Conner, LPN

## 2025-01-05 LAB
ABO + RH BLD: NORMAL
BLD GP AB SCN SERPL QL: NEGATIVE
SPECIMEN EXP DATE BLD: NORMAL

## 2025-01-06 ENCOUNTER — PRENATAL OFFICE VISIT (OUTPATIENT)
Dept: OBGYN | Facility: OTHER | Age: 25
End: 2025-01-06
Attending: OBSTETRICS & GYNECOLOGY
Payer: COMMERCIAL

## 2025-01-06 ENCOUNTER — HOSPITAL ENCOUNTER (OUTPATIENT)
Dept: ULTRASOUND IMAGING | Facility: OTHER | Age: 25
Discharge: HOME | End: 2025-01-06
Attending: OBSTETRICS & GYNECOLOGY
Payer: COMMERCIAL

## 2025-01-06 VITALS
BODY MASS INDEX: 34.01 KG/M2 | DIASTOLIC BLOOD PRESSURE: 74 MMHG | SYSTOLIC BLOOD PRESSURE: 120 MMHG | HEIGHT: 65 IN | HEART RATE: 92 BPM | WEIGHT: 204.1 LBS

## 2025-01-06 DIAGNOSIS — O09.90 SUPERVISION OF HIGH RISK PREGNANCY, ANTEPARTUM: Primary | ICD-10-CM

## 2025-01-06 DIAGNOSIS — O10.919 CHRONIC HYPERTENSION AFFECTING PREGNANCY: ICD-10-CM

## 2025-01-06 DIAGNOSIS — Z98.1 HISTORY OF LUMBAR SPINAL FUSION: ICD-10-CM

## 2025-01-06 DIAGNOSIS — Z32.01 POSITIVE PREGNANCY TEST: ICD-10-CM

## 2025-01-06 DIAGNOSIS — Z3A.09 9 WEEKS GESTATION OF PREGNANCY: ICD-10-CM

## 2025-01-06 DIAGNOSIS — O21.9 NAUSEA/VOMITING IN PREGNANCY: ICD-10-CM

## 2025-01-06 LAB
ALBUMIN MFR UR ELPH: 22.2 MG/DL
ALBUMIN SERPL BCG-MCNC: 4.3 G/DL (ref 3.5–5.2)
ALBUMIN UR-MCNC: 20 MG/DL
ALP SERPL-CCNC: 110 U/L (ref 40–150)
ALT SERPL W P-5'-P-CCNC: 18 U/L (ref 0–50)
ANION GAP SERPL CALCULATED.3IONS-SCNC: 13 MMOL/L (ref 7–15)
APPEARANCE UR: CLEAR
AST SERPL W P-5'-P-CCNC: 13 U/L (ref 0–45)
BACTERIA #/AREA URNS HPF: ABNORMAL /HPF
BASOPHILS # BLD AUTO: 0 10E3/UL (ref 0–0.2)
BASOPHILS NFR BLD AUTO: 0 %
BILIRUB SERPL-MCNC: 0.3 MG/DL
BILIRUB UR QL STRIP: NEGATIVE
BUN SERPL-MCNC: 4.8 MG/DL (ref 6–20)
C TRACH DNA SPEC QL PROBE+SIG AMP: NEGATIVE
CALCIUM SERPL-MCNC: 9.9 MG/DL (ref 8.8–10.4)
CHLORIDE SERPL-SCNC: 101 MMOL/L (ref 98–107)
COLOR UR AUTO: YELLOW
CREAT SERPL-MCNC: 0.53 MG/DL (ref 0.51–0.95)
CREAT UR-MCNC: 254.5 MG/DL
EGFRCR SERPLBLD CKD-EPI 2021: >90 ML/MIN/1.73M2
EOSINOPHIL # BLD AUTO: 0.1 10E3/UL (ref 0–0.7)
EOSINOPHIL NFR BLD AUTO: 2 %
ERYTHROCYTE [DISTWIDTH] IN BLOOD BY AUTOMATED COUNT: 12.5 % (ref 10–15)
GLUCOSE SERPL-MCNC: 83 MG/DL (ref 70–99)
GLUCOSE UR STRIP-MCNC: NEGATIVE MG/DL
HBV SURFACE AG SERPL QL IA: NONREACTIVE
HCO3 SERPL-SCNC: 25 MMOL/L (ref 22–29)
HCT VFR BLD AUTO: 39.7 % (ref 35–47)
HCV AB SERPL QL IA: NONREACTIVE
HGB BLD-MCNC: 14.2 G/DL (ref 11.7–15.7)
HGB UR QL STRIP: NEGATIVE
HIV 1+2 AB+HIV1 P24 AG SERPL QL IA: NONREACTIVE
IMM GRANULOCYTES # BLD: 0 10E3/UL
IMM GRANULOCYTES NFR BLD: 0 %
KETONES UR STRIP-MCNC: NEGATIVE MG/DL
LEUKOCYTE ESTERASE UR QL STRIP: NEGATIVE
LYMPHOCYTES # BLD AUTO: 1.3 10E3/UL (ref 0.8–5.3)
LYMPHOCYTES NFR BLD AUTO: 15 %
MCH RBC QN AUTO: 30.7 PG (ref 26.5–33)
MCHC RBC AUTO-ENTMCNC: 35.8 G/DL (ref 31.5–36.5)
MCV RBC AUTO: 86 FL (ref 78–100)
MONOCYTES # BLD AUTO: 0.6 10E3/UL (ref 0–1.3)
MONOCYTES NFR BLD AUTO: 7 %
MUCOUS THREADS #/AREA URNS LPF: PRESENT /LPF
N GONORRHOEA DNA SPEC QL NAA+PROBE: NEGATIVE
NEUTROPHILS # BLD AUTO: 6.4 10E3/UL (ref 1.6–8.3)
NEUTROPHILS NFR BLD AUTO: 76 %
NITRATE UR QL: NEGATIVE
NRBC # BLD AUTO: 0 10E3/UL
NRBC BLD AUTO-RTO: 0 /100
PH UR STRIP: 6.5 [PH] (ref 5–9)
PLATELET # BLD AUTO: 319 10E3/UL (ref 150–450)
POTASSIUM SERPL-SCNC: 4.1 MMOL/L (ref 3.4–5.3)
PROT SERPL-MCNC: 8 G/DL (ref 6.4–8.3)
PROT/CREAT 24H UR: 0.09 MG/MG CR (ref 0–0.2)
RBC # BLD AUTO: 4.63 10E6/UL (ref 3.8–5.2)
RBC URINE: 1 /HPF
RUBV IGG SERPL QL IA: 16.3 INDEX
RUBV IGG SERPL QL IA: POSITIVE
SODIUM SERPL-SCNC: 139 MMOL/L (ref 135–145)
SP GR UR STRIP: 1.02 (ref 1–1.03)
T PALLIDUM AB SER QL: NONREACTIVE
UROBILINOGEN UR STRIP-MCNC: 2 MG/DL
WBC # BLD AUTO: 8.5 10E3/UL (ref 4–11)
WBC URINE: 2 /HPF

## 2025-01-06 PROCEDURE — 87086 URINE CULTURE/COLONY COUNT: CPT | Mod: ZL | Performed by: OBSTETRICS & GYNECOLOGY

## 2025-01-06 PROCEDURE — 86762 RUBELLA ANTIBODY: CPT | Mod: ZL | Performed by: OBSTETRICS & GYNECOLOGY

## 2025-01-06 PROCEDURE — 85004 AUTOMATED DIFF WBC COUNT: CPT | Mod: ZL | Performed by: OBSTETRICS & GYNECOLOGY

## 2025-01-06 PROCEDURE — 76801 OB US < 14 WKS SINGLE FETUS: CPT

## 2025-01-06 PROCEDURE — 86900 BLOOD TYPING SEROLOGIC ABO: CPT | Performed by: OBSTETRICS & GYNECOLOGY

## 2025-01-06 PROCEDURE — 86803 HEPATITIS C AB TEST: CPT | Mod: ZL | Performed by: OBSTETRICS & GYNECOLOGY

## 2025-01-06 PROCEDURE — 80053 COMPREHEN METABOLIC PANEL: CPT | Mod: ZL | Performed by: OBSTETRICS & GYNECOLOGY

## 2025-01-06 PROCEDURE — 36415 COLL VENOUS BLD VENIPUNCTURE: CPT | Mod: ZL | Performed by: OBSTETRICS & GYNECOLOGY

## 2025-01-06 PROCEDURE — 87389 HIV-1 AG W/HIV-1&-2 AB AG IA: CPT | Mod: ZL | Performed by: OBSTETRICS & GYNECOLOGY

## 2025-01-06 PROCEDURE — 87591 N.GONORRHOEAE DNA AMP PROB: CPT | Mod: ZL | Performed by: OBSTETRICS & GYNECOLOGY

## 2025-01-06 PROCEDURE — 84156 ASSAY OF PROTEIN URINE: CPT | Mod: ZL | Performed by: OBSTETRICS & GYNECOLOGY

## 2025-01-06 PROCEDURE — 87340 HEPATITIS B SURFACE AG IA: CPT | Mod: ZL | Performed by: OBSTETRICS & GYNECOLOGY

## 2025-01-06 PROCEDURE — 87491 CHLMYD TRACH DNA AMP PROBE: CPT | Mod: ZL | Performed by: OBSTETRICS & GYNECOLOGY

## 2025-01-06 PROCEDURE — 86780 TREPONEMA PALLIDUM: CPT | Mod: ZL | Performed by: OBSTETRICS & GYNECOLOGY

## 2025-01-06 PROCEDURE — 85014 HEMATOCRIT: CPT | Mod: ZL | Performed by: OBSTETRICS & GYNECOLOGY

## 2025-01-06 PROCEDURE — 81003 URINALYSIS AUTO W/O SCOPE: CPT | Mod: ZL | Performed by: OBSTETRICS & GYNECOLOGY

## 2025-01-06 RX ORDER — METOCLOPRAMIDE 10 MG/1
10 TABLET ORAL 4 TIMES DAILY PRN
Qty: 60 TABLET | Refills: 1 | Status: SHIPPED | OUTPATIENT
Start: 2025-01-06

## 2025-01-06 RX ORDER — ONDANSETRON 4 MG/1
4 TABLET, ORALLY DISINTEGRATING ORAL EVERY 8 HOURS PRN
Qty: 30 TABLET | Refills: 1 | Status: SHIPPED | OUTPATIENT
Start: 2025-01-06

## 2025-01-06 ASSESSMENT — PATIENT HEALTH QUESTIONNAIRE - PHQ9
SUM OF ALL RESPONSES TO PHQ QUESTIONS 1-9: 12
SUM OF ALL RESPONSES TO PHQ QUESTIONS 1-9: 12
10. IF YOU CHECKED OFF ANY PROBLEMS, HOW DIFFICULT HAVE THESE PROBLEMS MADE IT FOR YOU TO DO YOUR WORK, TAKE CARE OF THINGS AT HOME, OR GET ALONG WITH OTHER PEOPLE: VERY DIFFICULT

## 2025-01-06 ASSESSMENT — PAIN SCALES - GENERAL: PAINLEVEL_OUTOF10: NO PAIN (0)

## 2025-01-06 NOTE — PROGRESS NOTES
Pt presents to clinic today for prenatal care 9w3d. Pt denies any bleeding, or leakage of fluid at this time.  Patient denies contractions. Patient complains of nausea, vomiting, fatigue, breast tenderness, and increased vaginal discharge.    Kaveh Back LPN...........................1/6/2025 9:27 AM

## 2025-01-06 NOTE — LETTER
January 6, 2025      Lyn Villaseñor  [address] [address] [address]        To Whom It May Concern:    Lyn Villaseñor was seen in clinic on 1/6/25.  She is unable to work at this time due to her ongoing condition.  We will plan to reassess her symptoms next week.  Thank you for your accomodation.         Sincerely,        Nadine Huston MD    Electronically signed

## 2025-01-06 NOTE — PROGRESS NOTES
[facility]  Confirmation of pregnancy visit     S: Lyn Villaseñor is a 24 year old  here today for pregnancy confirmation. She reports her LMP as Patient's last menstrual period was 2024 (exact date). She is certain of this date. She was using nothing for contraception prior to becoming pregnant. She had a positive home urine pregnancy test around . She endorses having early pregnancy symptoms including fatigue, nausea, vomiting. She is taking prenatal vitamins at this time. She denies having vaginal bleeding or cramping.    Has been struggling with nausea and vomiting.  Feels nauseated all the times and vomits throughout the day.  Has felt a little better over the past 3 days but has already vomited 2 times this morning. She does struggle with nausea and vomiting outside of pregnancy as well. Has tried reglan, zofran and compazine in the past and none of them worked great for her.  Recently has tried benadryl and dramamine without significant improvement. Is wearing sea bands all the time. Tried frequent small meals but still vomits. Is down approximately 10 pounds since becoming pregnant. There are times when she gets dehydrated, feels this is not a current issue.     Was diagnosed with chronic hypertension 2 years and and started on lisinopril ~ 1 year ago. Stopped lisinopril in early December and started nifedipine XL 30 mg daily.  Checks her BP daily at home. All values have been < 140/90.     This pregnancy was planned. This pregnancy is desired. The patient reports that she is excited about the pregnancy. FOMICHELLE Barlow is involved.      OB History    Para Term  AB Living   1 0 0 0 0 0   SAB IAB Ectopic Multiple Live Births   0 0 0 0 0      # Outcome Date GA Lbr Blake/2nd Weight Sex Type Anes PTL Lv   1 Current               Past Medical History:   Diagnosis Date    Multiple food allergies 2023    Wheat (low), scallops (low), shrimp (moderate), cow's milk  (moderate), egg white (moderate).    Spondylolysis of lumbar region     No Comments Provided      Past Surgical History:   Procedure Laterality Date    COLONOSCOPY  03/18/2024    normal - [facility]    EGD  03/18/2024    due to chronic nausea - normal - [facility]    ENDOSCOPIC SINUS SURGERY N/A 10/31/2023    Procedure: Endoscopic Sinus Surgery;  Surgeon: Lalita Hewitt MD;  Location: [facility] OR    Lumbar fusion of L5  8/2/3017    subsequent infection    SEPTOPLASTY, TURBINOPLASTY, COMBINED Bilateral 10/31/2023    Procedure: Septoplasty, Turbinate Reduction, Excision Right Vicenta Bullosa;  Surgeon: Lalita Hewitt MD;  Location: [facility] OR   Fusion between L4 and S1     Family History   Problem Relation Age of Onset    Hypertension Mother         Hypertension,Borderline    Hypertension Father         Hypertension    Hyperlipidemia Father         Hyperlipidemia    Hypertension Maternal Grandmother         Hypertension    Hypertension Maternal Grandfather         Hypertension    Hypertension Paternal Grandfather         Hypertension    Heart Disease Paternal Grandfather         Heart Disease,smoker      Social History     Socioeconomic History    Marital status:      Spouse name: ALBERTO    Number of children: Not on file    Years of education: Not on file    Highest education level: Not on file   Occupational History    Occupation: [employer]   Tobacco Use    Smoking status: Never     Passive exposure: Never    Smokeless tobacco: Never    Tobacco comments:     Quit smoking: Mom smokes outside   Vaping Use    Vaping status: Never Used   Substance and Sexual Activity    Alcohol use: No    Drug use: Never     Comment: Drug use: No    Sexual activity: Yes     Partners: Male   Other Topics Concern    Not on file   Social History Narrative    Lives with parents and two younger siblings.      Father works for [employer].  Mom is a counselor at [employer].   Kristine Ny Mother  Terence Ny Father   Javad Ny  Brother  Roxy Ny Sister    Graduated [employer] 2021.     Social Drivers of Health     Financial Resource Strain: Low Risk  (6/26/2024)    Financial Resource Strain     Within the past 12 months, have you or your family members you live with been unable to get utilities (heat, electricity) when it was really needed?: No   Food Insecurity: Low Risk  (6/26/2024)    Food Insecurity     Within the past 12 months, did you worry that your food would run out before you got money to buy more?: No     Within the past 12 months, did the food you bought just not last and you didn t have money to get more?: No   Transportation Needs: Low Risk  (6/26/2024)    Transportation Needs     Within the past 12 months, has lack of transportation kept you from medical appointments, getting your medicines, non-medical meetings or appointments, work, or from getting things that you need?: No   Physical Activity: Sufficiently Active (6/26/2024)    Exercise Vital Sign     Days of Exercise per Week: 5 days     Minutes of Exercise per Session: 150+ min   Stress: No Stress Concern Present (6/26/2024)    [employer] [place] [employer] - Occupational Stress Questionnaire     Feeling of Stress : Not at all   Social Connections: Unknown (6/26/2024)    Social Connection and Isolation Panel [NHANES]     Frequency of Communication with Friends and Family: Not on file     Frequency of Social Gatherings with Friends and Family: Twice a week     Attends Jainism Services: Not on file     Active Member of Clubs or Organizations: Not on file     Attends Club or Organization Meetings: Not on file     Marital Status: Not on file   Interpersonal Safety: Low Risk  (12/20/2024)    Interpersonal Safety     Do you feel physically and emotionally safe where you currently live?: Yes     Within the past 12 months, have you been hit, slapped, kicked or otherwise physically hurt by someone?: No     Within the past 12 months, have you been humiliated or  "emotionally abused in other ways by your partner or ex-partner?: No   Housing Stability: Low Risk  (2024)    Housing Stability     Do you have housing? 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 60 tablet 1    metoclopramide (REGLAN) 10 MG tablet Take 1 tablet (10 mg) by mouth 3 times daily as needed for other (headaches). 60 tablet 1    NIFEdipine ER OSMOTIC (PROCARDIA XL) 30 MG 24 hr tablet Take 1 tablet (30 mg) by mouth daily. 90 tablet 3    ondansetron (ZOFRAN ODT) 4 MG ODT tab Take 1 tablet (4 mg) by mouth every 8 hours as needed for nausea. 30 tablet 1    sertraline (ZOLOFT) 25 MG tablet Take 1 tablet (25 mg) by mouth daily. 90 tablet 3    fluticasone (FLOVENT HFA) 110 MCG/ACT inhaler Inhale 1 puff into the lungs 2 times daily (Patient not taking: Reported on 2025) 12 g 11    rizatriptan (MAXALT-MLT) 5 MG ODT Take 1 tablet (5 mg) by mouth at onset of headache for migraine May repeat in 2 hours. Max 6 tablets/24 hours. (Patient not taking: Reported on 2025) 20 tablet 11     No current facility-administered medications for this visit.      O:   Vitals:    25 0926   BP: 120/74   Pulse: 92   Weight: 92.6 kg (204 lb 1.6 oz)   Height: 1.645 m (5' 4.75\")      General: appears well, in NAD  Resp: breathing comfortably on room air     Dating ultrasound: Performed today. Shows a viable IUP measuring 9w1d,  bpm     A/P: Lyn Villaseñor is a 24 year old  at 9w3d by LMP c/w 9w1d US here " today for pregnancy confirmation following a positive UPT at home.      # Pregnancy  - Dating ultrasound performed today. Will use LMP for dating given certain dating consistent with US.    - Continue PNV  - Genetic screening: desires, will obtain today  - Immunizations: declined flu shot    # Chronic hypertension  - Continue nifedipine XL 30 mg  - Continue the check BP once daily, she will call if > 140/90  - Will obtain baseline HELLP labs today and UPC ratio  - Recommended starting aspirin 81 mg at 12 weeks gestation for Pre-E  prophylaxis  - Will plan to get a level II US given her lisinopril exposure in early pregnancy  - Consider Echo given chronic hypertension, will discuss at upcoming visits    # Nausea and vomiting in pregnancy  - Overall down 10 pounds, feeling a little better over the past few days  - Rx for zofran and reglan sent to her pharmacy  - She will call if she would like to try a antiemetic suppository or f she feels dehydrated and wants IV fluids  - Will check CMP today  - Denies significant constipation at this time but recommended starting stool softener if she is taking zofran regularly    # History of spinal fusion  - L4 to S1  - Will offer anesthesia consult after 28 weeks gestation     She will return to clinic in 1 week for reassessment      Nadine Huston MD

## 2025-01-08 LAB — BACTERIA UR CULT: NO GROWTH

## 2025-01-13 ENCOUNTER — PRENATAL OFFICE VISIT (OUTPATIENT)
Dept: OBGYN | Facility: OTHER | Age: 25
End: 2025-01-13
Attending: OBSTETRICS & GYNECOLOGY
Payer: COMMERCIAL

## 2025-01-13 ENCOUNTER — HOSPITAL ENCOUNTER (OUTPATIENT)
Dept: INFUSION THERAPY | Facility: OTHER | Age: 25
Discharge: HOME | End: 2025-01-13
Attending: OBSTETRICS & GYNECOLOGY
Payer: COMMERCIAL

## 2025-01-13 VITALS
DIASTOLIC BLOOD PRESSURE: 77 MMHG | TEMPERATURE: 97.5 F | SYSTOLIC BLOOD PRESSURE: 139 MMHG | RESPIRATION RATE: 15 BRPM | HEART RATE: 79 BPM

## 2025-01-13 VITALS
BODY MASS INDEX: 33.54 KG/M2 | WEIGHT: 200 LBS | DIASTOLIC BLOOD PRESSURE: 86 MMHG | SYSTOLIC BLOOD PRESSURE: 122 MMHG | HEART RATE: 87 BPM

## 2025-01-13 DIAGNOSIS — O21.9 NAUSEA AND VOMITING IN PREGNANCY: Primary | ICD-10-CM

## 2025-01-13 DIAGNOSIS — O21.9 NAUSEA AND VOMITING IN PREGNANCY: ICD-10-CM

## 2025-01-13 DIAGNOSIS — O21.9 NAUSEA AND VOMITING DURING PREGNANCY: Primary | ICD-10-CM

## 2025-01-13 PROCEDURE — 96361 HYDRATE IV INFUSION ADD-ON: CPT

## 2025-01-13 PROCEDURE — 250N000011 HC RX IP 250 OP 636: Performed by: OBSTETRICS & GYNECOLOGY

## 2025-01-13 PROCEDURE — 96374 THER/PROPH/DIAG INJ IV PUSH: CPT

## 2025-01-13 PROCEDURE — 250N000009 HC RX 250: Performed by: OBSTETRICS & GYNECOLOGY

## 2025-01-13 PROCEDURE — 99207 PR OB VISIT-NO CHARGE - GICH ONLY: CPT | Performed by: OBSTETRICS & GYNECOLOGY

## 2025-01-13 PROCEDURE — 258N000003 HC RX IP 258 OP 636: Performed by: OBSTETRICS & GYNECOLOGY

## 2025-01-13 RX ORDER — ONDANSETRON 2 MG/ML
4 INJECTION INTRAMUSCULAR; INTRAVENOUS ONCE
Status: CANCELLED | OUTPATIENT
Start: 2025-01-15 | End: 2025-01-13

## 2025-01-13 RX ORDER — ONDANSETRON 2 MG/ML
4 INJECTION INTRAMUSCULAR; INTRAVENOUS ONCE
Status: COMPLETED | OUTPATIENT
Start: 2025-01-13 | End: 2025-01-13

## 2025-01-13 RX ORDER — PROCHLORPERAZINE 25 MG
25 SUPPOSITORY, RECTAL RECTAL EVERY 12 HOURS PRN
Qty: 30 SUPPOSITORY | Refills: 1 | Status: SHIPPED | OUTPATIENT
Start: 2025-01-13

## 2025-01-13 RX ORDER — ONDANSETRON 2 MG/ML
4 INJECTION INTRAMUSCULAR; INTRAVENOUS ONCE
Status: CANCELLED | OUTPATIENT
Start: 2025-01-13 | End: 2025-01-13

## 2025-01-13 RX ADMIN — ONDANSETRON 4 MG: 2 INJECTION INTRAMUSCULAR; INTRAVENOUS at 11:16

## 2025-01-13 RX ADMIN — ASCORBIC ACID, VITAMIN A PALMITATE, CHOLECALCIFEROL, THIAMINE HYDROCHLORIDE, RIBOFLAVIN-5 PHOSPHATE SODIUM, PYRIDOXINE HYDROCHLORIDE, NIACINAMIDE, DEXPANTHENOL, ALPHA-TOCOPHEROL ACETATE, VITAMIN K1, FOLIC ACID, BIOTIN, CYANOCOBALAMIN: 200; 3300; 200; 6; 3.6; 6; 40; 15; 10; 150; 600; 60; 5 INJECTION, SOLUTION INTRAVENOUS at 11:09

## 2025-01-13 RX ADMIN — SODIUM CHLORIDE, POTASSIUM CHLORIDE, SODIUM LACTATE AND CALCIUM CHLORIDE 1000 ML: 600; 310; 30; 20 INJECTION, SOLUTION INTRAVENOUS at 12:14

## 2025-01-13 ASSESSMENT — PAIN SCALES - GENERAL: PAINLEVEL_OUTOF10: NO PAIN (0)

## 2025-01-13 NOTE — NURSING NOTE
"Chief Complaint   Patient presents with    Prenatal Care   Pt presents to clinic today for prenatal care 10w3d. Pt denies any bleeding.  Daphney Ann LPN on 1/13/2025 at 9:59 AM   Ext. 1161      Initial /86   Pulse 87   Wt 90.7 kg (200 lb)   LMP 11/01/2024 (Exact Date)   Breastfeeding No   BMI 33.54 kg/m   Estimated body mass index is 33.54 kg/m  as calculated from the following:    Height as of 1/6/25: 1.645 m (5' 4.75\").    Weight as of this encounter: 90.7 kg (200 lb).  Medication Reconciliation: complete    Daphney Ann LPN  "

## 2025-01-13 NOTE — PROGRESS NOTES
[facility]  Return OB Visit      S: Lyn Villaseñor presents today for a routine OB visit.  She has continued to have significant nausea and vomiting. Has vomited every meal for the past 9 days, often vomiting an estimated 10 times per day. When she vomits, bile comes up and burns, no more food in the stomach left to come up. Is unable to keep up with solid food and hydration, even water won't stay down for long. Eating snow felt slightly better. Has been using reglan and zofran each morning, but neither has been helpful. Takes benadryl nightly which helps her sleep, but when she wakes up she feels nauseous and uncomfortable. Has not vomited at night. Feels intermittently lightheaded, fatigued, and energetically drained.     Has mild headaches and a history of migraines but these are not too bothersome. No vision changes. Spends most of her days sleeping or lying in the dark.      No FM yet. Denies vb, lof, cramping or ctx     O:   Vitals:    25 0957   BP: 122/86   Pulse: 87   Weight: 90.7 kg (200 lb)      Gen: Well-appearing, NAD   Resp: Breathing comfortably on room air  Abd: gravid, soft, nontender  Neuro: grossly intact  Ext: no edema  Cervix: Did not visualize today     A/P:  Lyn Villaseñor is a 24 year old  at 10w3d by LMP, here for return OB visit.      # Persistent nausea and vomiting  - Has been unable to keep down water or solid foods  - Vomiting estimated 10 times/day for past 9 days  - Daily morning reglan and zofran have been ineffective  - At prior visit, we discussed starting IV fluids if vomiting persisted so we will start this today  - Compazine ordered, we can transition to phenergan if this is not effective     Nadine Huston MD

## 2025-01-13 NOTE — NURSING NOTE
Infusion Nursing Note:  Lyn Villaseñor presents today for IV fluids and Zofran.    Patient seen by provider today: No   present during visit today: Not Applicable.    Note: N/A.      Intravenous Access:  Peripheral IV placed.    Treatment Conditions:  Not Applicable.      Post Infusion Assessment:  Patient tolerated infusion without incident.  Blood return noted pre and post infusion.  Site patent and intact, free from redness, edema or discomfort.  No evidence of extravasations.  Access discontinued per protocol.       Discharge Plan:   Discharge instructions reviewed with: Patient.  Patient and/or family verbalized understanding of discharge instructions and all questions answered.  AVS to patient via [employer].  Patient will return 1/15/25 for next appointment.   Patient discharged in stable condition accompanied by: self.  Departure Mode: Ambulatory.      Betsy Monge RN

## 2025-01-13 NOTE — PROGRESS NOTES
[facility]  Return OB Visit      S: Lyn Villaseñor presents today for a routine OB visit.      - Has continued to have significant nausea and vomiting  - Has vomited about an hour after every meal for the past 9 days  - Estimates that she vomits 10 times a day  - Is unable to keep fluid down  - Has significant burning  - Has been using reglan and zofran which hasn't been helpful  - Has been having headaches which are not too bothersome. Does have a history of migraine headaches     No FM yet. Denies vb, lof, cramping or ctx     O:   Vitals:    25 0957   BP: 122/86   Pulse: 87   Weight: 90.7 kg (200 lb)      Gen: Well-appearing, NAD   Resp: Breathing comfortably on room air  Abd: gravid, soft, nontender  Neuro: grossly intact  Ext: no edema  Cervix: ***      Fundal Height: ***   FHR: ***   EFW: ***     A/P:  Lyn Villaseñor is a 24 year old  at 10w3d by ***, here for return OB visit.      ***      # Routine PNC   - Prenatal labs ***, Rh ***, Rubella ***, GCT ***   - Anatomy US: ***  - Immunizations: *** Tdap, *** Flu shot  - Genetic testing: ***  - Contraception: ***     Return to clinic in *** yolanda Huston MD

## 2025-01-15 ENCOUNTER — HOSPITAL ENCOUNTER (OUTPATIENT)
Dept: INFUSION THERAPY | Facility: OTHER | Age: 25
Discharge: HOME | End: 2025-01-15
Payer: COMMERCIAL

## 2025-01-15 VITALS
HEART RATE: 76 BPM | RESPIRATION RATE: 18 BRPM | DIASTOLIC BLOOD PRESSURE: 53 MMHG | SYSTOLIC BLOOD PRESSURE: 103 MMHG | TEMPERATURE: 98.3 F

## 2025-01-15 DIAGNOSIS — O21.9 NAUSEA AND VOMITING IN PREGNANCY: Primary | ICD-10-CM

## 2025-01-15 PROCEDURE — 250N000011 HC RX IP 250 OP 636: Performed by: OBSTETRICS & GYNECOLOGY

## 2025-01-15 PROCEDURE — 250N000009 HC RX 250: Performed by: OBSTETRICS & GYNECOLOGY

## 2025-01-15 PROCEDURE — 258N000003 HC RX IP 258 OP 636: Performed by: OBSTETRICS & GYNECOLOGY

## 2025-01-15 RX ORDER — ONDANSETRON 2 MG/ML
4 INJECTION INTRAMUSCULAR; INTRAVENOUS ONCE
Status: COMPLETED | OUTPATIENT
Start: 2025-01-15 | End: 2025-01-15

## 2025-01-15 RX ORDER — ONDANSETRON 2 MG/ML
4 INJECTION INTRAMUSCULAR; INTRAVENOUS ONCE
OUTPATIENT
Start: 2025-01-15 | End: 2025-01-15

## 2025-01-15 RX ADMIN — ONDANSETRON 4 MG: 2 INJECTION INTRAMUSCULAR; INTRAVENOUS at 10:03

## 2025-01-15 RX ADMIN — ASCORBIC ACID, VITAMIN A PALMITATE, CHOLECALCIFEROL, THIAMINE HYDROCHLORIDE, RIBOFLAVIN-5 PHOSPHATE SODIUM, PYRIDOXINE HYDROCHLORIDE, NIACINAMIDE, DEXPANTHENOL, ALPHA-TOCOPHEROL ACETATE, VITAMIN K1, FOLIC ACID, BIOTIN, CYANOCOBALAMIN: 200; 3300; 200; 6; 3.6; 6; 40; 15; 10; 150; 600; 60; 5 INJECTION, SOLUTION INTRAVENOUS at 11:25

## 2025-01-15 RX ADMIN — SODIUM CHLORIDE, SODIUM LACTATE, POTASSIUM CHLORIDE, AND CALCIUM CHLORIDE 1000 ML: 600; 310; 30; 20 INJECTION, SOLUTION INTRAVENOUS at 10:06

## 2025-01-15 NOTE — NURSING NOTE
Infusion Nursing Note:  Lyn Villaseñor presents today for IV fluids/Zofran.    Patient seen by provider today: No   present during visit today: Not Applicable.    Note: N/A.    Intravenous Access:  Peripheral IV placed.    Treatment Conditions:  Not Applicable.    Post Infusion Assessment:  Patient tolerated infusion without incident.  Blood return noted pre and post infusion.  Site patent and intact, free from redness, edema or discomfort.  No evidence of extravasations.  Access discontinued per protocol.     Discharge Plan:   Discharge instructions reviewed with: Patient.  Patient and/or family verbalized understanding of discharge instructions and all questions answered.  Copy of AVS declined by patient and/or family.  Patient will return 1/20/24 for next appointment.  AVS to patient via [employer].    Patient discharged in stable condition accompanied by: self.  Departure Mode: Ambulatory.      Soco Martin RN

## 2025-01-20 ENCOUNTER — HOSPITAL ENCOUNTER (OUTPATIENT)
Dept: INFUSION THERAPY | Facility: OTHER | Age: 25
Discharge: HOME | End: 2025-01-20
Admitting: FAMILY MEDICINE
Payer: COMMERCIAL

## 2025-01-20 ENCOUNTER — MYC MEDICAL ADVICE (OUTPATIENT)
Dept: OBGYN | Facility: OTHER | Age: 25
End: 2025-01-20
Payer: COMMERCIAL

## 2025-01-20 VITALS
DIASTOLIC BLOOD PRESSURE: 66 MMHG | RESPIRATION RATE: 16 BRPM | TEMPERATURE: 97.2 F | HEART RATE: 75 BPM | SYSTOLIC BLOOD PRESSURE: 119 MMHG

## 2025-01-20 DIAGNOSIS — O21.9 NAUSEA AND VOMITING IN PREGNANCY: Primary | ICD-10-CM

## 2025-01-20 PROCEDURE — 250N000009 HC RX 250: Performed by: STUDENT IN AN ORGANIZED HEALTH CARE EDUCATION/TRAINING PROGRAM

## 2025-01-20 PROCEDURE — 96360 HYDRATION IV INFUSION INIT: CPT

## 2025-01-20 PROCEDURE — 258N000003 HC RX IP 258 OP 636: Performed by: OBSTETRICS & GYNECOLOGY

## 2025-01-20 PROCEDURE — 96361 HYDRATE IV INFUSION ADD-ON: CPT

## 2025-01-20 PROCEDURE — 258N000003 HC RX IP 258 OP 636: Performed by: STUDENT IN AN ORGANIZED HEALTH CARE EDUCATION/TRAINING PROGRAM

## 2025-01-20 RX ORDER — ONDANSETRON 2 MG/ML
4 INJECTION INTRAMUSCULAR; INTRAVENOUS ONCE
Status: DISCONTINUED | OUTPATIENT
Start: 2025-01-20 | End: 2025-01-21 | Stop reason: HOSPADM

## 2025-01-20 RX ORDER — ONDANSETRON 2 MG/ML
4 INJECTION INTRAMUSCULAR; INTRAVENOUS ONCE
Status: CANCELLED | OUTPATIENT
Start: 2025-01-20 | End: 2025-01-20

## 2025-01-20 RX ADMIN — ASCORBIC ACID, VITAMIN A PALMITATE, CHOLECALCIFEROL, THIAMINE HYDROCHLORIDE, RIBOFLAVIN-5 PHOSPHATE SODIUM, PYRIDOXINE HYDROCHLORIDE, NIACINAMIDE, DEXPANTHENOL, ALPHA-TOCOPHEROL ACETATE, VITAMIN K1, FOLIC ACID, BIOTIN, CYANOCOBALAMIN: 200; 3300; 200; 6; 3.6; 6; 40; 15; 10; 150; 600; 60; 5 INJECTION, SOLUTION INTRAVENOUS at 08:52

## 2025-01-20 RX ADMIN — SODIUM CHLORIDE, SODIUM LACTATE, POTASSIUM CHLORIDE, AND CALCIUM CHLORIDE 1000 ML: 600; 310; 30; 20 INJECTION, SOLUTION INTRAVENOUS at 09:54

## 2025-01-20 ASSESSMENT — PATIENT HEALTH QUESTIONNAIRE - PHQ9
10. IF YOU CHECKED OFF ANY PROBLEMS, HOW DIFFICULT HAVE THESE PROBLEMS MADE IT FOR YOU TO DO YOUR WORK, TAKE CARE OF THINGS AT HOME, OR GET ALONG WITH OTHER PEOPLE: SOMEWHAT DIFFICULT
SUM OF ALL RESPONSES TO PHQ QUESTIONS 1-9: 14
SUM OF ALL RESPONSES TO PHQ QUESTIONS 1-9: 14

## 2025-01-20 NOTE — NURSING NOTE
Infusion Nursing Note:  Lyn P Lizajackelineyouangel presents today for Fluids and vitamins.    Patient seen by provider today: No   present during visit today: Not Applicable.    Note: Patient at this time declined IV zofran stating it doesn't seem to work.      Intravenous Access:  Peripheral IV placed to right arm with brisk blood return.    Treatment Conditions:  Not Applicable.      Post Infusion Assessment:  Patient tolerated infusion without incident.  Blood return noted pre and post infusion.  Site patent and intact, free from redness, edema or discomfort.  No evidence of extravasations.  Access discontinued per protocol.       Discharge Plan:   Discharge instructions reviewed with: Patient.  Patient and/or family verbalized understanding of discharge instructions and all questions answered.  Copy of AVS reviewed with patient and/or family.  Patient will return as schedule for next appointment.  Patient discharged in stable condition accompanied by: self.  Departure Mode: Ambulatory.      Monie Turpin RN

## 2025-01-21 ENCOUNTER — PRENATAL OFFICE VISIT (OUTPATIENT)
Dept: OBGYN | Facility: OTHER | Age: 25
End: 2025-01-21
Attending: OBSTETRICS & GYNECOLOGY
Payer: COMMERCIAL

## 2025-01-21 VITALS
SYSTOLIC BLOOD PRESSURE: 126 MMHG | BODY MASS INDEX: 33.37 KG/M2 | WEIGHT: 199 LBS | HEART RATE: 90 BPM | DIASTOLIC BLOOD PRESSURE: 70 MMHG

## 2025-01-21 DIAGNOSIS — O21.9 NAUSEA/VOMITING IN PREGNANCY: Primary | ICD-10-CM

## 2025-01-21 DIAGNOSIS — O10.919 CHRONIC HYPERTENSION AFFECTING PREGNANCY: ICD-10-CM

## 2025-01-21 DIAGNOSIS — Z3A.11 11 WEEKS GESTATION OF PREGNANCY: ICD-10-CM

## 2025-01-21 RX ORDER — PROMETHAZINE HYDROCHLORIDE 25 MG/1
25 SUPPOSITORY RECTAL EVERY 6 HOURS PRN
Qty: 30 SUPPOSITORY | Refills: 0 | Status: SHIPPED | OUTPATIENT
Start: 2025-01-21

## 2025-01-21 ASSESSMENT — PAIN SCALES - GENERAL: PAINLEVEL_OUTOF10: NO PAIN (0)

## 2025-01-21 NOTE — NURSING NOTE
"Chief Complaint   Patient presents with    Prenatal Care     11w4d   Pt presents to clinic today for prenatal care 11w4d. Pt denies any bleeding, or leakage of fluid at this time.  Daphney Ann LPN on 1/21/2025 at 11:09 AM   Ext. 1161    Initial /70   Pulse 90   Wt 90.3 kg (199 lb)   LMP 11/01/2024 (Exact Date)   Breastfeeding No   BMI 33.37 kg/m   Estimated body mass index is 33.37 kg/m  as calculated from the following:    Height as of 1/6/25: 1.645 m (5' 4.75\").    Weight as of this encounter: 90.3 kg (199 lb).  Medication Reconciliation: complete    Daphney Ann LPN  "

## 2025-01-23 ENCOUNTER — HOSPITAL ENCOUNTER (OUTPATIENT)
Dept: INFUSION THERAPY | Facility: OTHER | Age: 25
End: 2025-01-23
Payer: COMMERCIAL

## 2025-01-23 VITALS
SYSTOLIC BLOOD PRESSURE: 119 MMHG | DIASTOLIC BLOOD PRESSURE: 59 MMHG | OXYGEN SATURATION: 98 % | RESPIRATION RATE: 18 BRPM | HEART RATE: 89 BPM | TEMPERATURE: 99.7 F

## 2025-01-23 DIAGNOSIS — O21.9 NAUSEA AND VOMITING IN PREGNANCY: Primary | ICD-10-CM

## 2025-01-23 LAB
SCANNED LAB RESULT: NORMAL
SCANNED LAB RESULT: NORMAL

## 2025-01-23 PROCEDURE — 258N000003 HC RX IP 258 OP 636: Performed by: OBSTETRICS & GYNECOLOGY

## 2025-01-23 PROCEDURE — 250N000011 HC RX IP 250 OP 636: Performed by: OBSTETRICS & GYNECOLOGY

## 2025-01-23 RX ORDER — ONDANSETRON 2 MG/ML
4 INJECTION INTRAMUSCULAR; INTRAVENOUS ONCE
Status: COMPLETED | OUTPATIENT
Start: 2025-01-23 | End: 2025-01-23

## 2025-01-23 RX ORDER — ONDANSETRON 2 MG/ML
4 INJECTION INTRAMUSCULAR; INTRAVENOUS ONCE
OUTPATIENT
Start: 2025-01-23 | End: 2025-01-23

## 2025-01-23 RX ADMIN — ONDANSETRON 4 MG: 2 INJECTION INTRAMUSCULAR; INTRAVENOUS at 08:56

## 2025-01-23 RX ADMIN — SODIUM CHLORIDE, SODIUM LACTATE, POTASSIUM CHLORIDE, AND CALCIUM CHLORIDE 1000 ML: .6; .31; .03; .02 INJECTION, SOLUTION INTRAVENOUS at 08:52

## 2025-01-23 NOTE — ADDENDUM NOTE
Encounter addended by: Betsy Monge RN on: 1/23/2025 11:38 AM   Actions taken: Order Reconciliation Section accessed, Medication List reviewed, Allergies reviewed

## 2025-01-23 NOTE — NURSING NOTE
Infusion Nursing Note:  Lyn Villaseñor presents today for IV fluids/Zofran.    Patient seen by provider today: No   present during visit today: Not Applicable.    Note: Patient seen by Dr. Huston on 1/21/25. Per patient therapy plan was to be updated to reflect increase dose of Zofran and elimination of MVI bag. As of appt today no changes to therapy plan. Patient declined MVI bag. 4mg zofran given and LR given. Therapy plan sent to Dr. Huston with message to update plan if desired.      Intravenous Access:  Peripheral IV placed.    Treatment Conditions:  Not Applicable.      Post Infusion Assessment:  Patient tolerated infusion without incident.  Blood return noted pre and post infusion.  Site patent and intact, free from redness, edema or discomfort.  No evidence of extravasations.  Access discontinued per protocol.       Discharge Plan:   Discharge instructions reviewed with: Patient.  AVS to patient via [employer].  Patient will return 1/27/25 for next appointment.   Patient discharged in stable condition accompanied by: self.  Departure Mode: Ambulatory.      Betsy Monge RN

## 2025-01-27 RX ORDER — ONDANSETRON 2 MG/ML
8 INJECTION INTRAMUSCULAR; INTRAVENOUS ONCE
Status: CANCELLED | OUTPATIENT
Start: 2025-01-27 | End: 2025-01-27

## 2025-01-29 ASSESSMENT — PATIENT HEALTH QUESTIONNAIRE - PHQ9
SUM OF ALL RESPONSES TO PHQ QUESTIONS 1-9: 13
SUM OF ALL RESPONSES TO PHQ QUESTIONS 1-9: 13
10. IF YOU CHECKED OFF ANY PROBLEMS, HOW DIFFICULT HAVE THESE PROBLEMS MADE IT FOR YOU TO DO YOUR WORK, TAKE CARE OF THINGS AT HOME, OR GET ALONG WITH OTHER PEOPLE: VERY DIFFICULT

## 2025-01-30 ENCOUNTER — PRENATAL OFFICE VISIT (OUTPATIENT)
Dept: OBGYN | Facility: OTHER | Age: 25
End: 2025-01-30
Attending: OBSTETRICS & GYNECOLOGY
Payer: COMMERCIAL

## 2025-01-30 ENCOUNTER — HOSPITAL ENCOUNTER (OUTPATIENT)
Dept: INFUSION THERAPY | Facility: OTHER | Age: 25
End: 2025-01-30
Payer: COMMERCIAL

## 2025-01-30 VITALS
BODY MASS INDEX: 32.53 KG/M2 | WEIGHT: 194 LBS | HEART RATE: 80 BPM | DIASTOLIC BLOOD PRESSURE: 76 MMHG | SYSTOLIC BLOOD PRESSURE: 122 MMHG

## 2025-01-30 VITALS
HEART RATE: 76 BPM | TEMPERATURE: 98 F | RESPIRATION RATE: 18 BRPM | SYSTOLIC BLOOD PRESSURE: 104 MMHG | DIASTOLIC BLOOD PRESSURE: 59 MMHG

## 2025-01-30 DIAGNOSIS — O21.9 NAUSEA AND VOMITING IN PREGNANCY: Primary | ICD-10-CM

## 2025-01-30 DIAGNOSIS — Z3A.12 12 WEEKS GESTATION OF PREGNANCY: ICD-10-CM

## 2025-01-30 DIAGNOSIS — O10.919 CHRONIC HYPERTENSION AFFECTING PREGNANCY: ICD-10-CM

## 2025-01-30 DIAGNOSIS — O21.9 NAUSEA/VOMITING IN PREGNANCY: ICD-10-CM

## 2025-01-30 DIAGNOSIS — O21.0 HYPEREMESIS GRAVIDARUM: ICD-10-CM

## 2025-01-30 DIAGNOSIS — O09.91 SUPERVISION OF HIGH RISK PREGNANCY IN FIRST TRIMESTER: Primary | ICD-10-CM

## 2025-01-30 LAB
ALBUMIN SERPL BCG-MCNC: 4.1 G/DL (ref 3.5–5.2)
ALP SERPL-CCNC: 92 U/L (ref 40–150)
ALT SERPL W P-5'-P-CCNC: 20 U/L (ref 0–50)
ANION GAP SERPL CALCULATED.3IONS-SCNC: 13 MMOL/L (ref 7–15)
AST SERPL W P-5'-P-CCNC: 22 U/L (ref 0–45)
BILIRUB SERPL-MCNC: 0.4 MG/DL
BUN SERPL-MCNC: 4.8 MG/DL (ref 6–20)
CALCIUM SERPL-MCNC: 9.3 MG/DL (ref 8.8–10.4)
CHLORIDE SERPL-SCNC: 101 MMOL/L (ref 98–107)
CREAT SERPL-MCNC: 0.49 MG/DL (ref 0.51–0.95)
EGFRCR SERPLBLD CKD-EPI 2021: >90 ML/MIN/1.73M2
GLUCOSE SERPL-MCNC: 82 MG/DL (ref 70–99)
HCO3 SERPL-SCNC: 23 MMOL/L (ref 22–29)
POTASSIUM SERPL-SCNC: 3.9 MMOL/L (ref 3.4–5.3)
PROT SERPL-MCNC: 7.7 G/DL (ref 6.4–8.3)
SODIUM SERPL-SCNC: 137 MMOL/L (ref 135–145)

## 2025-01-30 PROCEDURE — 250N000011 HC RX IP 250 OP 636: Performed by: OBSTETRICS & GYNECOLOGY

## 2025-01-30 PROCEDURE — 84155 ASSAY OF PROTEIN SERUM: CPT

## 2025-01-30 PROCEDURE — 84075 ASSAY ALKALINE PHOSPHATASE: CPT

## 2025-01-30 PROCEDURE — 36415 COLL VENOUS BLD VENIPUNCTURE: CPT

## 2025-01-30 PROCEDURE — 258N000003 HC RX IP 258 OP 636: Performed by: OBSTETRICS & GYNECOLOGY

## 2025-01-30 RX ORDER — ONDANSETRON 2 MG/ML
8 INJECTION INTRAMUSCULAR; INTRAVENOUS ONCE
OUTPATIENT
Start: 2025-01-30 | End: 2025-01-30

## 2025-01-30 RX ORDER — PROMETHAZINE HYDROCHLORIDE 25 MG/1
25 TABLET ORAL EVERY 6 HOURS PRN
Qty: 30 TABLET | Refills: 1 | Status: SHIPPED | OUTPATIENT
Start: 2025-01-30

## 2025-01-30 RX ORDER — ONDANSETRON 2 MG/ML
8 INJECTION INTRAMUSCULAR; INTRAVENOUS ONCE
Status: COMPLETED | OUTPATIENT
Start: 2025-01-30 | End: 2025-01-30

## 2025-01-30 RX ADMIN — SODIUM CHLORIDE, SODIUM LACTATE, POTASSIUM CHLORIDE, AND CALCIUM CHLORIDE 2000 ML: .6; .31; .03; .02 INJECTION, SOLUTION INTRAVENOUS at 11:11

## 2025-01-30 RX ADMIN — ONDANSETRON 8 MG: 2 INJECTION INTRAMUSCULAR; INTRAVENOUS at 11:13

## 2025-01-30 ASSESSMENT — PAIN SCALES - GENERAL: PAINLEVEL_OUTOF10: NO PAIN (0)

## 2025-01-30 NOTE — PROGRESS NOTES
[facility]  Return OB Visit      S: Lyn Villaseñor presents today for a routine OB visit.      - Has been feeling miserable over the past week  - Was so sick that she missed her infusion visit on Monday which she thinks has made things worse   - Hasn't tried phenergan yet, is going to pick it up today  - Symptoms worsen during the evening and if she doesn't completely empty her stomach before she goes to bed, she will vomit all night long  - Reglan hasn't been helpful at all. Zofran has been somewhat helpful.      No FM yet. Denies vb, lof, cramping or ctx     O:   Vitals:    25 0955   BP: 122/76   Pulse: 80   Weight: 88 kg (194 lb)      Gen: Well-appearing, NAD   Resp: Breathing comfortably on room air  Abd: gravid, soft, nontender  Neuro: grossly intact  Ext: no edema    FHR WNL on BSUS     A/P:  Lyn Villaseñor is a 24 year old  at 12w6d by LMP, here for return OB visit.      # Hyperemesis gravidarum  - Nausea and vomiting has persisted, is not improving   - Is down 19 pounds so far this pregnancy   - Will increase IVF to 3 times weekly  - Will try phenergan as a next step  - Will check CMP today    # Chronic hypertension  - Currently on nifedipine 30 mg   - Will start ASA between 12 and 16 weeks, waiting for nausea to improve  - Normal baseline HELLP labs, UPC ratio 0.09    Return to clinic in 1 week to reassess symptoms. She will let us know if the phenergan is helpful     Nadine Huston MD

## 2025-01-30 NOTE — NURSING NOTE
"Chief Complaint   Patient presents with    Prenatal Care     12w6d   Pt presents to clinic today for prenatal care 12w6d. Pt denies any bleeding, or leakage of fluid at this time.      Initial /76   Pulse 80   Wt 88 kg (194 lb)   LMP 11/01/2024 (Exact Date)   Breastfeeding No   BMI 32.53 kg/m   Estimated body mass index is 32.53 kg/m  as calculated from the following:    Height as of 1/6/25: 1.645 m (5' 4.75\").    Weight as of this encounter: 88 kg (194 lb).  Medication Reconciliation: complete    Daphney Ann LPN  "

## 2025-01-30 NOTE — NURSING NOTE
Infusion Nursing Note:  Lyn Villaseñor presents today for Lab/Fluids/Zofran.    Patient seen by provider today: No   present during visit today: Not Applicable.    Note: N/A.      Intravenous Access:  Labs drawn without difficulty.  Peripheral IV placed to top of right hand with blood return.    Treatment Conditions:  Not Applicable.      Post Infusion Assessment:  Patient tolerated infusion without incident.  Blood return noted pre and post infusion.  Site patent and intact, free from redness, edema or discomfort.  No evidence of extravasations.  Access discontinued per protocol.       Discharge Plan:   Discharge instructions reviewed with: Patient.  Patient and/or family verbalized understanding of discharge instructions and all questions answered.  Declined Copy of AVS.  Patient will return next weeks as scheduled for next appointment.  Patient discharged in stable condition accompanied by: self.  Departure Mode: Ambulatory.      Monie Turpin RN

## 2025-02-05 ENCOUNTER — HOSPITAL ENCOUNTER (OUTPATIENT)
Dept: INFUSION THERAPY | Facility: OTHER | Age: 25
Discharge: HOME | End: 2025-02-05
Payer: COMMERCIAL

## 2025-02-05 VITALS — DIASTOLIC BLOOD PRESSURE: 62 MMHG | HEART RATE: 83 BPM | TEMPERATURE: 97.8 F | SYSTOLIC BLOOD PRESSURE: 114 MMHG

## 2025-02-05 DIAGNOSIS — O21.9 NAUSEA AND VOMITING IN PREGNANCY: Primary | ICD-10-CM

## 2025-02-05 PROCEDURE — 258N000003 HC RX IP 258 OP 636: Performed by: OBSTETRICS & GYNECOLOGY

## 2025-02-05 PROCEDURE — 250N000011 HC RX IP 250 OP 636: Performed by: OBSTETRICS & GYNECOLOGY

## 2025-02-05 RX ORDER — ONDANSETRON 2 MG/ML
8 INJECTION INTRAMUSCULAR; INTRAVENOUS ONCE
OUTPATIENT
Start: 2025-02-05 | End: 2025-02-05

## 2025-02-05 RX ORDER — ONDANSETRON 2 MG/ML
8 INJECTION INTRAMUSCULAR; INTRAVENOUS ONCE
Status: COMPLETED | OUTPATIENT
Start: 2025-02-05 | End: 2025-02-05

## 2025-02-05 RX ADMIN — SODIUM CHLORIDE, POTASSIUM CHLORIDE, SODIUM LACTATE AND CALCIUM CHLORIDE 2000 ML: 600; 310; 30; 20 INJECTION, SOLUTION INTRAVENOUS at 10:15

## 2025-02-05 RX ADMIN — ONDANSETRON 8 MG: 2 INJECTION INTRAMUSCULAR; INTRAVENOUS at 10:14

## 2025-02-05 NOTE — NURSING NOTE
Infusion Nursing Note:  Lyn Villaseñor presents today for IV fluids /Zofran.    Patient seen by provider today: No   present during visit today: Not Applicable.    Note: N/A.    Intravenous Access:  Peripheral IV placed.    Treatment Conditions:  Not Applicable.    Post Infusion Assessment:  Patient tolerated infusion without incident.  Blood return noted pre and post infusion.  Site patent and intact, free from redness, edema or discomfort.  No evidence of extravasations.  Access discontinued per protocol.     Discharge Plan:   Discharge instructions reviewed with: Patient.  Patient and/or family verbalized understanding of discharge instructions and all questions answered.  Copy of AVS declined by patient and/or family.  Patient will return 2/7/25 for next appointment.  AVS to patient via [facility].    Patient discharged in stable condition accompanied by: self.  Departure Mode: Ambulatory.      Soco Martin RN

## 2025-02-11 ENCOUNTER — MYC MEDICAL ADVICE (OUTPATIENT)
Dept: OBGYN | Facility: OTHER | Age: 25
End: 2025-02-11
Payer: COMMERCIAL

## 2025-02-12 ENCOUNTER — HOSPITAL ENCOUNTER (OUTPATIENT)
Dept: CARDIOLOGY | Facility: OTHER | Age: 25
Discharge: HOME | End: 2025-02-12
Attending: OBSTETRICS & GYNECOLOGY
Payer: COMMERCIAL

## 2025-02-12 ENCOUNTER — HOSPITAL ENCOUNTER (OUTPATIENT)
Dept: INFUSION THERAPY | Facility: OTHER | Age: 25
Discharge: HOME | End: 2025-02-12
Payer: COMMERCIAL

## 2025-02-12 VITALS
RESPIRATION RATE: 16 BRPM | HEART RATE: 83 BPM | TEMPERATURE: 98.6 F | DIASTOLIC BLOOD PRESSURE: 83 MMHG | SYSTOLIC BLOOD PRESSURE: 142 MMHG

## 2025-02-12 DIAGNOSIS — O21.9 NAUSEA AND VOMITING IN PREGNANCY: Primary | ICD-10-CM

## 2025-02-12 DIAGNOSIS — O10.919 CHRONIC HYPERTENSION AFFECTING PREGNANCY: ICD-10-CM

## 2025-02-12 LAB — LVEF ECHO: NORMAL

## 2025-02-12 PROCEDURE — 258N000003 HC RX IP 258 OP 636: Performed by: OBSTETRICS & GYNECOLOGY

## 2025-02-12 PROCEDURE — 93306 TTE W/DOPPLER COMPLETE: CPT

## 2025-02-12 PROCEDURE — 250N000011 HC RX IP 250 OP 636: Performed by: OBSTETRICS & GYNECOLOGY

## 2025-02-12 RX ORDER — ONDANSETRON 2 MG/ML
8 INJECTION INTRAMUSCULAR; INTRAVENOUS ONCE
Status: COMPLETED | OUTPATIENT
Start: 2025-02-12 | End: 2025-02-12

## 2025-02-12 RX ORDER — ONDANSETRON 2 MG/ML
8 INJECTION INTRAMUSCULAR; INTRAVENOUS ONCE
OUTPATIENT
Start: 2025-02-12 | End: 2025-02-12

## 2025-02-12 RX ADMIN — SODIUM CHLORIDE, POTASSIUM CHLORIDE, SODIUM LACTATE AND CALCIUM CHLORIDE 2000 ML: 600; 310; 30; 20 INJECTION, SOLUTION INTRAVENOUS at 13:13

## 2025-02-12 RX ADMIN — ONDANSETRON 8 MG: 2 INJECTION INTRAMUSCULAR; INTRAVENOUS at 13:11

## 2025-02-12 NOTE — NURSING NOTE
Infusion Nursing Note:  Lyn Villaseñor presents today for IV Fluids/ Zofran.    Patient seen by provider today: No   present during visit today: Not Applicable.    Note: N/A.    Intravenous Access:  Peripheral IV placed.    Treatment Conditions:  Not Applicable.    Post Infusion Assessment:  Patient tolerated infusion without incident.  Blood return noted pre and post infusion.  Site patent and intact, free from redness, edema or discomfort.  No evidence of extravasations.  Access discontinued per protocol.     Discharge Plan:   Discharge instructions reviewed with: Patient.  Patient and/or family verbalized understanding of discharge instructions and all questions answered.  Copy of AVS declined by patient and/or family.  Patient will return 2/14/25 for next appointment.  AVS to patient via [employer].    Patient discharged in stable condition accompanied by: self.  Departure Mode: Ambulatory.      Soco Martin RN

## 2025-03-03 ENCOUNTER — PRENATAL OFFICE VISIT (OUTPATIENT)
Dept: OBGYN | Facility: OTHER | Age: 25
End: 2025-03-03
Attending: OBSTETRICS & GYNECOLOGY
Payer: COMMERCIAL

## 2025-03-03 VITALS
HEART RATE: 82 BPM | DIASTOLIC BLOOD PRESSURE: 84 MMHG | OXYGEN SATURATION: 99 % | SYSTOLIC BLOOD PRESSURE: 136 MMHG | RESPIRATION RATE: 16 BRPM | TEMPERATURE: 97.9 F | WEIGHT: 203.5 LBS | BODY MASS INDEX: 34.13 KG/M2

## 2025-03-03 DIAGNOSIS — O10.012 BENIGN ESSENTIAL HTN, CHRONIC, ANTEPARTUM, SECOND TRIMESTER: ICD-10-CM

## 2025-03-03 DIAGNOSIS — Z34.92 ENCOUNTER FOR PREGNANCY RELATED EXAMINATION IN SECOND TRIMESTER: Primary | ICD-10-CM

## 2025-03-03 ASSESSMENT — PAIN SCALES - GENERAL: PAINLEVEL_OUTOF10: NO PAIN (0)

## 2025-03-03 ASSESSMENT — PATIENT HEALTH QUESTIONNAIRE - PHQ9
SUM OF ALL RESPONSES TO PHQ QUESTIONS 1-9: 8
10. IF YOU CHECKED OFF ANY PROBLEMS, HOW DIFFICULT HAVE THESE PROBLEMS MADE IT FOR YOU TO DO YOUR WORK, TAKE CARE OF THINGS AT HOME, OR GET ALONG WITH OTHER PEOPLE: SOMEWHAT DIFFICULT
SUM OF ALL RESPONSES TO PHQ QUESTIONS 1-9: 8

## 2025-03-03 NOTE — PROGRESS NOTES
OB Visit    S: Patient is feeling better now- nausea is much less. Able to eat and drink. Denies LOF, VB, or regular Ctx. unsure FM.    Concerns: none    O: /84   Pulse 82   Temp 97.9  F (36.6  C) (Tympanic)   Resp 16   Wt 92.3 kg (203 lb 8 oz)   LMP 2024 (Exact Date)   SpO2 99%   Breastfeeding No   BMI 34.13 kg/m    Gen: Well-appearing, NAD  FHT: 138- grossly normal by bedside US      A/P:  Lyn Villaseñor is a 24 year old  at 17w3d by Lmp CW 9w1d US, here for return OB visit.  cHTN- continues nifedipine, asa 81 mg, baseline Pre E labs competed- plan MFM, growth monthly at 28 weeks, BPPs at 32 weeks- all discussed   Hyperemesis- resolved- still has some nausea intemrittenlt but is able to adequately eat and drink. Pt is up 10#s since last visit     PNC: We discussed the below recommendations for planning, testing, and add on options as well as detailed any increased risk based on patient history. The subsequent choices and results if any are reflected below.   Prenatal WNL, Rh +, Rubella immune, GCT TBD, 3rd Trimester HGB TBD, GBS TBD   Rhogam: NA  Genetics: low risk NIPS and Carrier   Imaginweek dating, MFM ordered   Immunizations: TDAP TBD, RSV TBD  Delivery Plan: TB- 37 weeks    BC after delivery: TBD  Delivery Hx: NA     RTC 4 weeks with MFM        REJI Bolanos-SYLVAIN  3/3/2025 10:56 AM

## 2025-03-03 NOTE — NURSING NOTE
"Chief Complaint   Patient presents with    Prenatal Care     17 weeks 3 days        Initial /84   Pulse 82   Temp 97.9  F (36.6  C) (Tympanic)   Resp 16   Wt 92.3 kg (203 lb 8 oz)   LMP 11/01/2024 (Exact Date)   SpO2 99%   Breastfeeding No   BMI 34.13 kg/m   Estimated body mass index is 34.13 kg/m  as calculated from the following:    Height as of 1/6/25: 1.645 m (5' 4.75\").    Weight as of this encounter: 92.3 kg (203 lb 8 oz).  Medication Reconciliation: complete    Ronel Lucio CMA    "

## 2025-03-25 ENCOUNTER — TELEPHONE (OUTPATIENT)
Dept: OBGYN | Facility: OTHER | Age: 25
End: 2025-03-25
Payer: COMMERCIAL

## 2025-03-25 ENCOUNTER — HOSPITAL ENCOUNTER (OUTPATIENT)
Facility: OTHER | Age: 25
Discharge: HOME | End: 2025-03-25
Attending: OBSTETRICS & GYNECOLOGY | Admitting: OBSTETRICS & GYNECOLOGY
Payer: COMMERCIAL

## 2025-03-25 ENCOUNTER — HOSPITAL ENCOUNTER (OUTPATIENT)
Facility: OTHER | Age: 25
End: 2025-03-25
Admitting: OBSTETRICS & GYNECOLOGY
Payer: COMMERCIAL

## 2025-03-25 VITALS
WEIGHT: 209 LBS | HEIGHT: 65 IN | DIASTOLIC BLOOD PRESSURE: 60 MMHG | SYSTOLIC BLOOD PRESSURE: 111 MMHG | OXYGEN SATURATION: 96 % | RESPIRATION RATE: 18 BRPM | HEART RATE: 98 BPM | TEMPERATURE: 97.9 F | BODY MASS INDEX: 34.82 KG/M2

## 2025-03-25 DIAGNOSIS — Z36.89 ENCOUNTER FOR TRIAGE IN PREGNANT PATIENT: Primary | ICD-10-CM

## 2025-03-25 LAB
ALBUMIN MFR UR ELPH: 13.6 MG/DL
ALBUMIN SERPL BCG-MCNC: 3.6 G/DL (ref 3.5–5.2)
ALP SERPL-CCNC: 94 U/L (ref 40–150)
ALT SERPL W P-5'-P-CCNC: 19 U/L (ref 0–50)
ANION GAP SERPL CALCULATED.3IONS-SCNC: 14 MMOL/L (ref 7–15)
AST SERPL W P-5'-P-CCNC: 18 U/L (ref 0–45)
BILIRUB SERPL-MCNC: 0.2 MG/DL
BUN SERPL-MCNC: 7.4 MG/DL (ref 6–20)
CALCIUM SERPL-MCNC: 9.3 MG/DL (ref 8.8–10.4)
CHLORIDE SERPL-SCNC: 99 MMOL/L (ref 98–107)
CREAT SERPL-MCNC: 0.42 MG/DL (ref 0.51–0.95)
CREAT UR-MCNC: 137.4 MG/DL
EGFRCR SERPLBLD CKD-EPI 2021: >90 ML/MIN/1.73M2
GLUCOSE SERPL-MCNC: 94 MG/DL (ref 70–99)
HCO3 SERPL-SCNC: 24 MMOL/L (ref 22–29)
HOLD SPECIMEN: NORMAL
HOLD SPECIMEN: NORMAL
POTASSIUM SERPL-SCNC: 3.8 MMOL/L (ref 3.4–5.3)
PROT SERPL-MCNC: 7.2 G/DL (ref 6.4–8.3)
PROT/CREAT 24H UR: 0.1 MG/MG CR (ref 0–0.2)
SODIUM SERPL-SCNC: 137 MMOL/L (ref 135–145)

## 2025-03-25 PROCEDURE — 999N000104 HC STATISTIC NO CHARGE: Performed by: FAMILY MEDICINE

## 2025-03-25 PROCEDURE — 84156 ASSAY OF PROTEIN URINE: CPT | Performed by: OBSTETRICS & GYNECOLOGY

## 2025-03-25 PROCEDURE — G0463 HOSPITAL OUTPT CLINIC VISIT: HCPCS

## 2025-03-25 PROCEDURE — 80053 COMPREHEN METABOLIC PANEL: CPT | Performed by: OBSTETRICS & GYNECOLOGY

## 2025-03-25 PROCEDURE — 36415 COLL VENOUS BLD VENIPUNCTURE: CPT | Performed by: OBSTETRICS & GYNECOLOGY

## 2025-03-25 RX ORDER — ACETAMINOPHEN 325 MG/1
975 TABLET ORAL ONCE
Status: DISCONTINUED | OUTPATIENT
Start: 2025-03-25 | End: 2025-03-25 | Stop reason: HOSPADM

## 2025-03-25 ASSESSMENT — ACTIVITIES OF DAILY LIVING (ADL)
ADLS_ACUITY_SCORE: 18
ADLS_ACUITY_SCORE: 20

## 2025-03-25 ASSESSMENT — COLUMBIA-SUICIDE SEVERITY RATING SCALE - C-SSRS
6. HAVE YOU EVER DONE ANYTHING, STARTED TO DO ANYTHING, OR PREPARED TO DO ANYTHING TO END YOUR LIFE?: NO
1. IN THE PAST MONTH, HAVE YOU WISHED YOU WERE DEAD OR WISHED YOU COULD GO TO SLEEP AND NOT WAKE UP?: NO
2. HAVE YOU ACTUALLY HAD ANY THOUGHTS OF KILLING YOURSELF IN THE PAST MONTH?: NO

## 2025-03-25 NOTE — ED TRIAGE NOTES
"Pt presents to ED via private car with c/o hypertension and RUQ pain that started at 1430 today. Pt is 20 4/7 weeks gestation . Pt last took her nifedipine at 2200 last night. BP (!) 143/63   Pulse 97   Temp 97.8  F (36.6  C) (Tympanic)   Resp 18   Ht 1.651 m (5' 5\")   Wt 94.8 kg (209 lb)   LMP 2024 (Exact Date)   SpO2 98%   BMI 34.78 kg/m      Spoke with [facility] nurse Amairani and Pt brought to 403   Triage Assessment (Adult)       Row Name 25 172          Triage Assessment    Airway WDL WDL        Respiratory WDL    Respiratory WDL WDL        Skin Circulation/Temperature WDL    Skin Circulation/Temperature WDL WDL        Cardiac WDL    Cardiac WDL WDL        Peripheral/Neurovascular WDL    Peripheral Neurovascular WDL WDL        Cognitive/Neuro/Behavioral WDL    Cognitive/Neuro/Behavioral WDL WDL                     "

## 2025-03-25 NOTE — TELEPHONE ENCOUNTER
Spoke with patient who states she has had elevated blood pressure since about 3pm. She has been taking measurements every 10-15 minutes and she ranges from 140//100. She denies cold and flu symptoms. Denies vaginal bleeding, leaking of fluid, contractions. She reports heart palpitations that have continued since 3pm. Denies chest pain. Endorses right sided pain, upper abdominal pain and low back pain. She does feel dizziness when she stands. Patient in agreement to get ride and present to ED.  Angeline Palomares RN on 3/25/2025 at 4:24 PM

## 2025-03-25 NOTE — TELEPHONE ENCOUNTER
High blood pressure and side pain.  Worried about preeclampsia and wondering if she needs to be seen.  Jenna Harmon on 3/25/2025 at 4:06 PM

## 2025-03-26 NOTE — PROGRESS NOTES
Discharge Note      Data:  Lyn Villaseñor discharged to home at 1945 via ambulation.     Action:  Written discharge/follow-up instructions were provided to patient. Prescriptions - None ordered for discharge. All belongings sent with patient.    Response:  Lyn verbalized understanding of discharge instructions, reason for discharge, and necessary follow-up appointments.

## 2025-04-08 ENCOUNTER — PRENATAL OFFICE VISIT (OUTPATIENT)
Dept: OBGYN | Facility: OTHER | Age: 25
End: 2025-04-08
Attending: OBSTETRICS & GYNECOLOGY
Payer: COMMERCIAL

## 2025-04-08 ENCOUNTER — HOSPITAL ENCOUNTER (OUTPATIENT)
Dept: ULTRASOUND IMAGING | Facility: CLINIC | Age: 25
Discharge: HOME | End: 2025-04-08
Attending: OBSTETRICS & GYNECOLOGY
Payer: COMMERCIAL

## 2025-04-08 ENCOUNTER — VIRTUAL VISIT (OUTPATIENT)
Dept: MATERNAL FETAL MEDICINE | Facility: CLINIC | Age: 25
End: 2025-04-08
Attending: OBSTETRICS & GYNECOLOGY
Payer: COMMERCIAL

## 2025-04-08 ENCOUNTER — HOSPITAL ENCOUNTER (OUTPATIENT)
Dept: ULTRASOUND IMAGING | Facility: OTHER | Age: 25
Discharge: HOME | End: 2025-04-08
Payer: COMMERCIAL

## 2025-04-08 VITALS
SYSTOLIC BLOOD PRESSURE: 128 MMHG | HEART RATE: 86 BPM | DIASTOLIC BLOOD PRESSURE: 76 MMHG | BODY MASS INDEX: 35.83 KG/M2 | WEIGHT: 215.3 LBS

## 2025-04-08 DIAGNOSIS — O16.2 HYPERTENSION AFFECTING PREGNANCY, SECOND TRIMESTER: Primary | ICD-10-CM

## 2025-04-08 DIAGNOSIS — O10.919 CHRONIC HYPERTENSION AFFECTING PREGNANCY: ICD-10-CM

## 2025-04-08 DIAGNOSIS — Z3A.22 22 WEEKS GESTATION OF PREGNANCY: ICD-10-CM

## 2025-04-08 DIAGNOSIS — O99.212 OBESITY AFFECTING PREGNANCY IN SECOND TRIMESTER, UNSPECIFIED OBESITY TYPE: ICD-10-CM

## 2025-04-08 DIAGNOSIS — O10.919 CHRONIC HYPERTENSION AFFECTING PREGNANCY: Primary | ICD-10-CM

## 2025-04-08 DIAGNOSIS — O09.92 SUPERVISION OF HIGH RISK PREGNANCY IN SECOND TRIMESTER: Primary | ICD-10-CM

## 2025-04-08 DIAGNOSIS — O10.012 BENIGN ESSENTIAL HTN, CHRONIC, ANTEPARTUM, SECOND TRIMESTER: ICD-10-CM

## 2025-04-08 PROCEDURE — 3074F SYST BP LT 130 MM HG: CPT | Performed by: OBSTETRICS & GYNECOLOGY

## 2025-04-08 PROCEDURE — 76811 OB US DETAILED SNGL FETUS: CPT

## 2025-04-08 PROCEDURE — 99207 PR OB VISIT-NO CHARGE - GICH ONLY: CPT | Performed by: OBSTETRICS & GYNECOLOGY

## 2025-04-08 PROCEDURE — 1126F AMNT PAIN NOTED NONE PRSNT: CPT | Performed by: OBSTETRICS & GYNECOLOGY

## 2025-04-08 PROCEDURE — 0502F SUBSEQUENT PRENATAL CARE: CPT | Performed by: OBSTETRICS & GYNECOLOGY

## 2025-04-08 PROCEDURE — 3078F DIAST BP <80 MM HG: CPT | Performed by: OBSTETRICS & GYNECOLOGY

## 2025-04-08 RX ORDER — ASPIRIN 81 MG/1
81 TABLET ORAL DAILY
Qty: 100 TABLET | Refills: 3 | Status: SHIPPED | OUTPATIENT
Start: 2025-04-08

## 2025-04-08 ASSESSMENT — PATIENT HEALTH QUESTIONNAIRE - PHQ9
SUM OF ALL RESPONSES TO PHQ QUESTIONS 1-9: 5
10. IF YOU CHECKED OFF ANY PROBLEMS, HOW DIFFICULT HAVE THESE PROBLEMS MADE IT FOR YOU TO DO YOUR WORK, TAKE CARE OF THINGS AT HOME, OR GET ALONG WITH OTHER PEOPLE: NOT DIFFICULT AT ALL
SUM OF ALL RESPONSES TO PHQ QUESTIONS 1-9: 5

## 2025-04-08 ASSESSMENT — PAIN SCALES - GENERAL: PAINLEVEL_OUTOF10: NO PAIN (0)

## 2025-04-08 NOTE — NURSING NOTE
"Chief Complaint   Patient presents with    Prenatal Care     22w4d   Pt presents to clinic today for prenatal care 22w4d. Pt denies any bleeding, or leakage of fluid at this time. States baby is moving good. Patient denies contractions.    Daphney Ann LPN on 4/8/2025 at 3:33 PM      Initial /76   Pulse 86   Wt 97.7 kg (215 lb 4.8 oz)   LMP 11/01/2024 (Exact Date)   Breastfeeding No   BMI 35.83 kg/m   Estimated body mass index is 35.83 kg/m  as calculated from the following:    Height as of 3/25/25: 1.651 m (5' 5\").    Weight as of this encounter: 97.7 kg (215 lb 4.8 oz).  Medication Reconciliation: complete    Daphney Ann LPN  "

## 2025-04-08 NOTE — PROGRESS NOTES
Maternal-Fetal Medicine Telemedicine Visit   Date of service:  Date: 04/08/25     Virtual Visit Details    Type of service:  Video Visit     Originating Location (pt. Location): [facility]  Distant Location (provider location):  On-site  Platform used for Video Visit: [employer]     Type of service: Fetal ultrasound   Reason: Lack of available service in patient's area     Description of basis or telemedicine appropriateness:   Consultation provided at the request of Ne Thorne for advice regarding the diagnosis and treatment of this patient's prengnacy.  The patient's condition can be safely assessed via telemedicine.    For a detailed report of the ultrasound examination, please see the ultrasound report which can be found under the imaging tab.    If you have questions regarding today's evaluation or if we can be of further service, please contact the [facility] Medicine Center.    Soco Brown MD  Specialist in Maternal-Fetal Medicine

## 2025-04-09 DIAGNOSIS — O10.919 CHRONIC HYPERTENSION AFFECTING PREGNANCY: Primary | ICD-10-CM

## 2025-04-09 DIAGNOSIS — O09.90 SUPERVISION OF HIGH RISK PREGNANCY, ANTEPARTUM: ICD-10-CM

## 2025-04-21 NOTE — PROGRESS NOTES
OB Visit    S:   - Has been doing and feeling well overall  - Nausea is completely resolved!  - Denies concerns today    + FM. Denies vb, lof, cramping, ctx    O: /76   Pulse 86   Wt 97.7 kg (215 lb 4.8 oz)   LMP 2024 (Exact Date)   Breastfeeding No   BMI 35.83 kg/m    Gen: Well-appearing, NAD  Resp: No increased work of breathing  Abd:  Gravid, non tender  Ext:  No edema    FHR: 142 bpm    A/P:  Lyn Villaseñor is a 24 year old  at 22w4d by Lmp CW 9w1d US, here for return OB visit.    # cHTN, lisinopril exposure in early pregnancy  - On nifedipine 30 mg daily, asa 81 mg, baseline Pre E labs WNL  - Continue to periodically check BP at home  - Had level II US today, no abnormalities but did have suboptimal views  - Begin q4 week growth US, BPPs at 32 weeks    Prenatal WNL, Rh +, Rubella immune, GCT TBD, 3rd Trimester HGB TBD, GBS TBD   Genetics: low risk NIPS and Carrier   Imagin week dating, MFM ordered   Immunizations: TDAP TBD, RSV TBD  Delivery Plan: TB- 37 weeks    BC after delivery: TBD  Delivery Hx: NA     RTC 4 weeks     Nadine Huston MD

## 2025-05-05 ASSESSMENT — PATIENT HEALTH QUESTIONNAIRE - PHQ9
SUM OF ALL RESPONSES TO PHQ QUESTIONS 1-9: 6
SUM OF ALL RESPONSES TO PHQ QUESTIONS 1-9: 6
10. IF YOU CHECKED OFF ANY PROBLEMS, HOW DIFFICULT HAVE THESE PROBLEMS MADE IT FOR YOU TO DO YOUR WORK, TAKE CARE OF THINGS AT HOME, OR GET ALONG WITH OTHER PEOPLE: SOMEWHAT DIFFICULT

## 2025-05-06 ENCOUNTER — VIRTUAL VISIT (OUTPATIENT)
Dept: MATERNAL FETAL MEDICINE | Facility: CLINIC | Age: 25
End: 2025-05-06
Attending: OBSTETRICS & GYNECOLOGY
Payer: COMMERCIAL

## 2025-05-06 ENCOUNTER — HOSPITAL ENCOUNTER (OUTPATIENT)
Dept: ULTRASOUND IMAGING | Facility: CLINIC | Age: 25
Discharge: HOME | End: 2025-05-06
Attending: OBSTETRICS & GYNECOLOGY
Payer: COMMERCIAL

## 2025-05-06 ENCOUNTER — HOSPITAL ENCOUNTER (OUTPATIENT)
Dept: ULTRASOUND IMAGING | Facility: OTHER | Age: 25
Discharge: HOME | End: 2025-05-06
Attending: OBSTETRICS & GYNECOLOGY
Payer: COMMERCIAL

## 2025-05-06 ENCOUNTER — PRENATAL OFFICE VISIT (OUTPATIENT)
Dept: OBGYN | Facility: OTHER | Age: 25
End: 2025-05-06
Attending: OBSTETRICS & GYNECOLOGY
Payer: COMMERCIAL

## 2025-05-06 VITALS
HEART RATE: 92 BPM | BODY MASS INDEX: 36.66 KG/M2 | SYSTOLIC BLOOD PRESSURE: 130 MMHG | DIASTOLIC BLOOD PRESSURE: 80 MMHG | WEIGHT: 220.3 LBS

## 2025-05-06 DIAGNOSIS — O10.919 CHRONIC HYPERTENSION AFFECTING PREGNANCY: ICD-10-CM

## 2025-05-06 DIAGNOSIS — O16.2 HYPERTENSION AFFECTING PREGNANCY, SECOND TRIMESTER: Primary | ICD-10-CM

## 2025-05-06 DIAGNOSIS — O99.212 OBESITY AFFECTING PREGNANCY IN SECOND TRIMESTER, UNSPECIFIED OBESITY TYPE: ICD-10-CM

## 2025-05-06 DIAGNOSIS — Z3A.26 26 WEEKS GESTATION OF PREGNANCY: ICD-10-CM

## 2025-05-06 DIAGNOSIS — O09.92 SUPERVISION OF HIGH RISK PREGNANCY IN SECOND TRIMESTER: Primary | ICD-10-CM

## 2025-05-06 LAB
ERYTHROCYTE [DISTWIDTH] IN BLOOD BY AUTOMATED COUNT: 12.6 % (ref 10–15)
GLUCOSE 1H P 50 G GLC PO SERPL-MCNC: 100 MG/DL (ref 70–129)
HCT VFR BLD AUTO: 32.5 % (ref 35–47)
HGB BLD-MCNC: 11.6 G/DL (ref 11.7–15.7)
MCH RBC QN AUTO: 30.6 PG (ref 26.5–33)
MCHC RBC AUTO-ENTMCNC: 35.7 G/DL (ref 31.5–36.5)
MCV RBC AUTO: 86 FL (ref 78–100)
PLATELET # BLD AUTO: 315 10E3/UL (ref 150–450)
RBC # BLD AUTO: 3.79 10E6/UL (ref 3.8–5.2)
WBC # BLD AUTO: 11 10E3/UL (ref 4–11)

## 2025-05-06 PROCEDURE — 3079F DIAST BP 80-89 MM HG: CPT | Performed by: OBSTETRICS & GYNECOLOGY

## 2025-05-06 PROCEDURE — 99207 PR OB VISIT-NO CHARGE - GICH ONLY: CPT | Performed by: OBSTETRICS & GYNECOLOGY

## 2025-05-06 PROCEDURE — 86780 TREPONEMA PALLIDUM: CPT | Mod: ZL | Performed by: OBSTETRICS & GYNECOLOGY

## 2025-05-06 PROCEDURE — 3075F SYST BP GE 130 - 139MM HG: CPT | Performed by: OBSTETRICS & GYNECOLOGY

## 2025-05-06 PROCEDURE — 36415 COLL VENOUS BLD VENIPUNCTURE: CPT | Mod: ZL | Performed by: OBSTETRICS & GYNECOLOGY

## 2025-05-06 PROCEDURE — 0502F SUBSEQUENT PRENATAL CARE: CPT | Performed by: OBSTETRICS & GYNECOLOGY

## 2025-05-06 PROCEDURE — 85041 AUTOMATED RBC COUNT: CPT | Mod: ZL | Performed by: OBSTETRICS & GYNECOLOGY

## 2025-05-06 PROCEDURE — 1126F AMNT PAIN NOTED NONE PRSNT: CPT | Performed by: OBSTETRICS & GYNECOLOGY

## 2025-05-06 PROCEDURE — 85018 HEMOGLOBIN: CPT | Mod: ZL | Performed by: OBSTETRICS & GYNECOLOGY

## 2025-05-06 PROCEDURE — 82950 GLUCOSE TEST: CPT | Mod: ZL | Performed by: OBSTETRICS & GYNECOLOGY

## 2025-05-06 ASSESSMENT — PAIN SCALES - GENERAL: PAINLEVEL_OUTOF10: NO PAIN (0)

## 2025-05-06 NOTE — PROGRESS NOTES
OB Visit    S:   - Has been doing and feeling well overall  - Nausea is completely resolved!  - Denies concerns today    + FM. Denies vb, lof, cramping, ctx    O: /80 (BP Location: Right arm, Patient Position: Sitting, Cuff Size: Adult Large)   Pulse 92   Wt 99.9 kg (220 lb 4.8 oz)   LMP 2024 (Exact Date)   BMI 36.66 kg/m    Gen: Well-appearing, NAD  Resp: No increased work of breathing  Abd:  Gravid, non tender  Ext:  No edema    FHR: 142 bpm    A/P:  Lyn Villaseñor is a 24 year old  at 22w4d by Lmp CW 9w1d US, here for return OB visit.    # cHTN, lisinopril exposure in early pregnancy  - On nifedipine 30 mg daily, asa 81 mg, baseline Pre E labs WNL  - Continue to periodically check BP at home  - Had level II US, has incomplete heart views  - Begin q4 week growth US, BPPs at 32 weeks    Prenatal WNL, Rh +, Rubella immune, GCT TBD, 3rd Trimester HGB TBD, GBS TBD   Genetics: low risk NIPS and Carrier   Imagin week dating, normal anatomy, posterior placenta  Immunizations: TDAP TBD, RSV TBD  Delivery Plan: TB- 37 weeks    BC after delivery: TBD  Delivery Hx: NA     RTC 4 weeks     Nadine Huston MD

## 2025-05-06 NOTE — NURSING NOTE
"Chief Complaint   Patient presents with    Prenatal Care     26w4d     - Doing and feeling good    Concerns:  - denies any concerns     + FM. Denies vb, lof, cramping, ctx   Initial /80 (BP Location: Right arm, Patient Position: Sitting, Cuff Size: Adult Large)   Pulse 92   Wt 99.9 kg (220 lb 4.8 oz)   LMP 11/01/2024 (Exact Date)   BMI 36.66 kg/m   Estimated body mass index is 36.66 kg/m  as calculated from the following:    Height as of 3/25/25: 1.651 m (5' 5\").    Weight as of this encounter: 99.9 kg (220 lb 4.8 oz).  Medication Review: complete    The next two questions are to help us understand your food security.  If you are feeling you need any assistance in this area, we have resources available to support you today.          6/26/2024   SDOH- Food Insecurity   Within the past 12 months, did you worry that your food would run out before you got money to buy more? N   Within the past 12 months, did the food you bought just not last and you didn t have money to get more? N         Health Care Directive:  Patient does not have a Health Care Directive: Discussed advance care planning with patient; however, patient declined at this time.    Jud Newman, CIELO      "

## 2025-05-06 NOTE — PROGRESS NOTES
Type of service:    Telemedicine Office Visit for prenatal ultrasound    Date of service:     Date: 05/06/25     Time service began:    11:00 AM  Time service ended:    12:00 PM    Reason:      Lack of available service in patient area    Description of basis or telemedicine appropriateness:     Consultation provided at the request of Ne Thorne for advice regarding the diagnosis and treatment of this patient's pregnancy.  The patient's condition can be safely assessed via telemedicine.    The Mode of Transmission:    Secure interactive audio and visual telecommunication system (Video Guidance)    Location of originating and distant sites:      Originating site:   [place], [place]    Distant site:    [place], [place]    For a detailed report of the ultrasound examination, please see the ultrasound report which can be found under the imaging tab.      Gissell Hubbard MD

## 2025-05-07 ENCOUNTER — RESULTS FOLLOW-UP (OUTPATIENT)
Dept: OBGYN | Facility: OTHER | Age: 25
End: 2025-05-07

## 2025-05-07 LAB — T PALLIDUM AB SER QL: NONREACTIVE

## 2025-05-08 ENCOUNTER — RESULTS FOLLOW-UP (OUTPATIENT)
Dept: OBGYN | Facility: CLINIC | Age: 25
End: 2025-05-08

## 2025-06-02 NOTE — PROGRESS NOTES
Abdomen , soft, nontender, nondistended , no guarding or rigidity , no masses palpable , normal bowel sounds , Liver and Spleen , no hepatomegaly present , no hepatosplenomegaly , liver nontender , spleen not palpable [facility]  Return OB Visit      S: Lyn Villaseñor presents today for a routine OB visit.  She has continued to have significant nausea and vomiting. Has been doing IV fluids but feels very nauseated after having the infusions.  Overall feels she has more energy and isn't as weak. Has been able to better hydrate at home but continues to have severe nausea in the evening.      No FM yet. Denies vb, lof, cramping or ctx     O:   Vitals:    25 1106   BP: 126/70   Pulse: 90   Weight: 90.3 kg (199 lb)      Gen: Well-appearing, NAD   Resp: Breathing comfortably on room air  Abd: gravid, soft, nontender  Neuro: grossly intact  Ext: no edema    FHR WNL on BSUS     A/P:  Lyn Villaseñor is a 24 year old  at 11w4d by LMP, here for return OB visit.      # Persistent nausea and vomiting  - Nausea and vomiting has persisted but has more energy and isn't feeling as weak after starting IVF  - Will discontinue IV vitamins and increase zofran she receives with her infusions  - Compazine suppository wasn't helpful, will try phenergan    # Chronic hypertension  - Currently on nifedipine 30 mg   - Will start ASA between 12 and 16 weeks, waiting for nausea to improve    Return to clinic in 1 week to reassess symptoms. She will let us know if the phenergan  been helpful, would send in oral phenergan at that time.     Nadine Huston MD

## 2025-06-05 ENCOUNTER — PRENATAL OFFICE VISIT (OUTPATIENT)
Dept: OBGYN | Facility: OTHER | Age: 25
End: 2025-06-05
Attending: OBSTETRICS & GYNECOLOGY
Payer: COMMERCIAL

## 2025-06-05 VITALS
SYSTOLIC BLOOD PRESSURE: 130 MMHG | WEIGHT: 236.1 LBS | BODY MASS INDEX: 39.29 KG/M2 | HEART RATE: 101 BPM | DIASTOLIC BLOOD PRESSURE: 84 MMHG

## 2025-06-05 DIAGNOSIS — O10.919 CHRONIC HYPERTENSION IN PREGNANCY: ICD-10-CM

## 2025-06-05 DIAGNOSIS — O09.93 SUPERVISION OF HIGH RISK PREGNANCY IN THIRD TRIMESTER: Primary | ICD-10-CM

## 2025-06-05 ASSESSMENT — PATIENT HEALTH QUESTIONNAIRE - PHQ9
SUM OF ALL RESPONSES TO PHQ QUESTIONS 1-9: 4
10. IF YOU CHECKED OFF ANY PROBLEMS, HOW DIFFICULT HAVE THESE PROBLEMS MADE IT FOR YOU TO DO YOUR WORK, TAKE CARE OF THINGS AT HOME, OR GET ALONG WITH OTHER PEOPLE: NOT DIFFICULT AT ALL
SUM OF ALL RESPONSES TO PHQ QUESTIONS 1-9: 4

## 2025-06-05 ASSESSMENT — PAIN SCALES - GENERAL: PAINLEVEL_OUTOF10: NO PAIN (0)

## 2025-06-05 NOTE — NURSING NOTE
"Chief Complaint   Patient presents with    Prenatal Care     30w6d     Pt presents to clinic today for prenatal care 30w6d. Pt denies any bleeding, or leakage of fluid at this time. States baby is moving good. Patient denies contractions.   She states her BP has been elevated at home in the high 130s low 140s. She has also had a lot of pubic pain. She states that baby is head down now as well.     Initial /84 (BP Location: Right arm, Patient Position: Sitting, Cuff Size: Adult Large)   Pulse 101   Wt 107.1 kg (236 lb 1.6 oz)   LMP 11/01/2024 (Exact Date)   BMI 39.29 kg/m   Estimated body mass index is 39.29 kg/m  as calculated from the following:    Height as of 3/25/25: 1.651 m (5' 5\").    Weight as of this encounter: 107.1 kg (236 lb 1.6 oz).  Medication Review: complete    The next two questions are to help us understand your food security.  If you are feeling you need any assistance in this area, we have resources available to support you today.          6/26/2024   SDOH- Food Insecurity   Within the past 12 months, did you worry that your food would run out before you got money to buy more? N   Within the past 12 months, did the food you bought just not last and you didn t have money to get more? N        Data saved with a previous flowsheet row definition         Health Care Directive:  Patient does not have a Health Care Directive: Discussed advance care planning with patient; however, patient declined at this time.    Jud Newman, CIELO      "

## 2025-06-05 NOTE — PROGRESS NOTES
CHIEF COMPLAINT:  Routine OB visit at 30w6d    HPI: Lyn Villaseñor presents for a routine OB visit at 30w6d.  Patient is having a lot of pubic pressure.    Patient notices normal fetal movement, denies contractions, vaginal bleeding or leaking of fluid.    O: /84 (BP Location: Right arm, Patient Position: Sitting, Cuff Size: Adult Large)   Pulse 101   Wt 107.1 kg (236 lb 1.6 oz)   LMP 2024 (Exact Date)   BMI 39.29 kg/m    Body mass index is 39.29 kg/m .  See OB flow sheet  EXAM:   General: Alert, cooperative, clear coherent speech  Respiratory: Unlabored, no wheezing  Lower extremities: No lower extremity edema, normal perfusion    FHT:143 bpm per US    No results found for any visits on 25.    ASSESSMENT/PLAN:     Lyn Villaseñor is a 24 year old  at 30w6d by LMP c/w 9w1d US, here for return OB visit. Having a lot of pubic pressure, discussed trying a belly band if desires.      # cHTN, lisinopril exposure in early pregnancy  - On nifedipine 30 mg daily, asa 81 mg, baseline Pre E labs WNL  - asymptomatic  - Continue to periodically check BP at home-pressures have been 120's/80's at rest, up to 140's/80's  - Level II US, no abnormalities just limited views, follow up US today complete normal anatomy   - Begin q4 week growth US, BPPs at 32 weeks- Growth US today 58%    --missed fetal echo this week     Prenatal WNL, Rh +, Rubella immune, , 3rd Trimester HGB 11.6, GBS TBD  Genetics: low risk NIPS and Carrier   Imagin week dating, MFM anatomy US completed  Immunizations: TDAP discussed, she is thinking about it, RSV TBD  Delivery Plan: desires natural delivery- open to epidural. Hx Lumbar fusion in 2017. Need to discuss delivery planning at 37 weeks/38 weeks  BC after delivery:  Discussed today, she is still thinking about it- may just use condoms   Delivery Hx: NA      RTC 2 weeks- BPP and OB check, finalize birth plan     Soco Marie, MADELYN.N.P., R.N., APRN  CNP  6/5/2025

## 2025-06-16 ASSESSMENT — PATIENT HEALTH QUESTIONNAIRE - PHQ9
SUM OF ALL RESPONSES TO PHQ QUESTIONS 1-9: 4
SUM OF ALL RESPONSES TO PHQ QUESTIONS 1-9: 4
10. IF YOU CHECKED OFF ANY PROBLEMS, HOW DIFFICULT HAVE THESE PROBLEMS MADE IT FOR YOU TO DO YOUR WORK, TAKE CARE OF THINGS AT HOME, OR GET ALONG WITH OTHER PEOPLE: SOMEWHAT DIFFICULT

## 2025-06-17 ENCOUNTER — PRENATAL OFFICE VISIT (OUTPATIENT)
Dept: OBGYN | Facility: OTHER | Age: 25
End: 2025-06-17
Attending: OBSTETRICS & GYNECOLOGY
Payer: COMMERCIAL

## 2025-06-17 ENCOUNTER — HOSPITAL ENCOUNTER (OUTPATIENT)
Dept: ULTRASOUND IMAGING | Facility: OTHER | Age: 25
Discharge: HOME | End: 2025-06-17
Attending: OBSTETRICS & GYNECOLOGY
Payer: COMMERCIAL

## 2025-06-17 VITALS
DIASTOLIC BLOOD PRESSURE: 74 MMHG | BODY MASS INDEX: 40.52 KG/M2 | HEART RATE: 113 BPM | SYSTOLIC BLOOD PRESSURE: 122 MMHG | WEIGHT: 243.5 LBS

## 2025-06-17 DIAGNOSIS — O09.92 SUPERVISION OF HIGH RISK PREGNANCY IN SECOND TRIMESTER: ICD-10-CM

## 2025-06-17 DIAGNOSIS — O99.210 OBESITY DURING PREGNANCY: ICD-10-CM

## 2025-06-17 DIAGNOSIS — O10.919 CHRONIC HYPERTENSION AFFECTING PREGNANCY: ICD-10-CM

## 2025-06-17 DIAGNOSIS — Z3A.32 32 WEEKS GESTATION OF PREGNANCY: ICD-10-CM

## 2025-06-17 DIAGNOSIS — O09.93 SUPERVISION OF HIGH RISK PREGNANCY IN THIRD TRIMESTER: Primary | ICD-10-CM

## 2025-06-17 PROCEDURE — 76819 FETAL BIOPHYS PROFIL W/O NST: CPT

## 2025-06-17 PROCEDURE — 76819 FETAL BIOPHYS PROFIL W/O NST: CPT | Mod: 26 | Performed by: RADIOLOGY

## 2025-06-17 ASSESSMENT — PAIN SCALES - GENERAL: PAINLEVEL_OUTOF10: MODERATE PAIN (6)

## 2025-06-17 NOTE — NURSING NOTE
"Chief Complaint   Patient presents with    Prenatal Care     32w4d     - Doing and feeling good but tired    Concerns:  - states she has been getting a of migraines. She had a hx of them before her pregnancy , and hadn't have any during her pregnancy until recently.    -passed her BPP 8/8    + FM. Denies vb, lof, cramping, ctx   Initial /74 (BP Location: Right arm, Patient Position: Sitting, Cuff Size: Adult Large)   Pulse 113   Wt 110.5 kg (243 lb 8 oz)   LMP 11/01/2024 (Exact Date)   BMI 40.52 kg/m   Estimated body mass index is 40.52 kg/m  as calculated from the following:    Height as of 3/25/25: 1.651 m (5' 5\").    Weight as of this encounter: 110.5 kg (243 lb 8 oz).  Medication Review: complete    The next two questions are to help us understand your food security.  If you are feeling you need any assistance in this area, we have resources available to support you today.          6/26/2024   SDOH- Food Insecurity   Within the past 12 months, did you worry that your food would run out before you got money to buy more? N   Within the past 12 months, did the food you bought just not last and you didn t have money to get more? N        Data saved with a previous flowsheet row definition         Health Care Directive:  Patient does not have a Health Care Directive: Discussed advance care planning with patient; however, patient declined at this time.    Jud Newman, CIELO      "

## 2025-06-17 NOTE — PROGRESS NOTES
OB Visit    S:   - Has been doing and feeling well overall  - Has been having more migraine headache over the past few weeks. Usually they resolve spontaneously, sometimes she has to take a tylenol to make them go away. Has had 5 over the past 2 weeks.     + FM. Denies vb, lof, cramping, ctx    O: /74 (BP Location: Right arm, Patient Position: Sitting, Cuff Size: Adult Large)   Pulse 113   Wt 110.5 kg (243 lb 8 oz)   LMP 2024 (Exact Date)   BMI 40.52 kg/m    Gen: Well-appearing, NAD  Resp: No increased work of breathing  Abd:  Gravid, non tender  Ext:  No edema    FHR: 149 bpm    BPP today:   Growth US : EFW 58%tile    A/P:  Lyn Villaseñor is a 24 year old  at 32w4d by LMP c/w 9w1d US, here for return OB visit.    # cHTN, lisinopril exposure in early pregnancy, obesity  - On nifedipine 30 mg daily, ASA 81 mg, baseline Pre E labs WNL  - Continue to periodically check BP at home  - Had level II US, normal anatomy  - Continue q4 week growth US, BPPs at 32 weeks  - Delivery timing pending BP control at end of pregnancy (between 37+0 and 39+6)  - Body mass index is 40.52 kg/m . Has gained 30 pounds so far this pregnancy    Prenatal WNL, Rh +, Rubella immune, , 3rd tri Hgb 11.6  Genetics: low risk NIPS and Carrier   Imagin week dating, normal anatomy, posterior placenta  Immunizations: TDAP today (), RSV TBD  Delivery Plan: Pain control per request, breast feeding. Lactation referral placed.  BC after delivery: thinking IUD vs condoms  Baby provider: Dr. Pop Jeffers    RTC 2 weeks, continue weekly BPP    Nadine Huston MD

## 2025-06-24 ENCOUNTER — HOSPITAL ENCOUNTER (OUTPATIENT)
Dept: ULTRASOUND IMAGING | Facility: OTHER | Age: 25
Discharge: HOME | End: 2025-06-24
Attending: OBSTETRICS & GYNECOLOGY
Payer: COMMERCIAL

## 2025-06-24 DIAGNOSIS — O09.92 SUPERVISION OF HIGH RISK PREGNANCY IN SECOND TRIMESTER: ICD-10-CM

## 2025-06-24 PROCEDURE — 76819 FETAL BIOPHYS PROFIL W/O NST: CPT

## 2025-06-24 PROCEDURE — 76819 FETAL BIOPHYS PROFIL W/O NST: CPT | Mod: 26 | Performed by: RADIOLOGY

## 2025-06-25 ENCOUNTER — RESULTS FOLLOW-UP (OUTPATIENT)
Dept: OBGYN | Facility: OTHER | Age: 25
End: 2025-06-25

## 2025-06-30 ASSESSMENT — PATIENT HEALTH QUESTIONNAIRE - PHQ9
SUM OF ALL RESPONSES TO PHQ QUESTIONS 1-9: 6
10. IF YOU CHECKED OFF ANY PROBLEMS, HOW DIFFICULT HAVE THESE PROBLEMS MADE IT FOR YOU TO DO YOUR WORK, TAKE CARE OF THINGS AT HOME, OR GET ALONG WITH OTHER PEOPLE: SOMEWHAT DIFFICULT
SUM OF ALL RESPONSES TO PHQ QUESTIONS 1-9: 6

## 2025-07-01 ENCOUNTER — HOSPITAL ENCOUNTER (OUTPATIENT)
Dept: ULTRASOUND IMAGING | Facility: OTHER | Age: 25
Discharge: HOME | End: 2025-07-01
Attending: OBSTETRICS & GYNECOLOGY
Payer: COMMERCIAL

## 2025-07-01 ENCOUNTER — RESULTS FOLLOW-UP (OUTPATIENT)
Dept: OBGYN | Facility: OTHER | Age: 25
End: 2025-07-01

## 2025-07-01 ENCOUNTER — PRENATAL OFFICE VISIT (OUTPATIENT)
Dept: OBGYN | Facility: OTHER | Age: 25
End: 2025-07-01
Attending: OBSTETRICS & GYNECOLOGY
Payer: COMMERCIAL

## 2025-07-01 VITALS
HEART RATE: 97 BPM | DIASTOLIC BLOOD PRESSURE: 74 MMHG | BODY MASS INDEX: 40.94 KG/M2 | SYSTOLIC BLOOD PRESSURE: 122 MMHG | WEIGHT: 246 LBS

## 2025-07-01 DIAGNOSIS — O10.919 CHRONIC HYPERTENSION AFFECTING PREGNANCY: ICD-10-CM

## 2025-07-01 DIAGNOSIS — O09.92 SUPERVISION OF HIGH RISK PREGNANCY IN SECOND TRIMESTER: ICD-10-CM

## 2025-07-01 DIAGNOSIS — O09.93 SUPERVISION OF HIGH RISK PREGNANCY IN THIRD TRIMESTER: Primary | ICD-10-CM

## 2025-07-01 DIAGNOSIS — G43.811 OTHER MIGRAINE WITH STATUS MIGRAINOSUS, INTRACTABLE: ICD-10-CM

## 2025-07-01 LAB
ALBUMIN MFR UR ELPH: 22.2 MG/DL
ALBUMIN SERPL BCG-MCNC: 3.2 G/DL (ref 3.5–5.2)
ALP SERPL-CCNC: 134 U/L (ref 40–150)
ALT SERPL W P-5'-P-CCNC: 16 U/L (ref 0–50)
ANION GAP SERPL CALCULATED.3IONS-SCNC: 11 MMOL/L (ref 7–15)
AST SERPL W P-5'-P-CCNC: 23 U/L (ref 0–45)
BASOPHILS # BLD AUTO: 0 10E3/UL (ref 0–0.2)
BASOPHILS NFR BLD AUTO: 0 %
BILIRUB SERPL-MCNC: 0.2 MG/DL
BUN SERPL-MCNC: 4.2 MG/DL (ref 6–20)
CALCIUM SERPL-MCNC: 8.6 MG/DL (ref 8.8–10.4)
CHLORIDE SERPL-SCNC: 101 MMOL/L (ref 98–107)
CREAT SERPL-MCNC: 0.45 MG/DL (ref 0.51–0.95)
CREAT UR-MCNC: 159.3 MG/DL
EGFRCR SERPLBLD CKD-EPI 2021: >90 ML/MIN/1.73M2
EOSINOPHIL # BLD AUTO: 0.1 10E3/UL (ref 0–0.7)
EOSINOPHIL NFR BLD AUTO: 1 %
ERYTHROCYTE [DISTWIDTH] IN BLOOD BY AUTOMATED COUNT: 13.8 % (ref 10–15)
GLUCOSE SERPL-MCNC: 75 MG/DL (ref 70–99)
HCO3 SERPL-SCNC: 22 MMOL/L (ref 22–29)
HCT VFR BLD AUTO: 36.3 % (ref 35–47)
HGB BLD-MCNC: 12.6 G/DL (ref 11.7–15.7)
IMM GRANULOCYTES # BLD: 0.1 10E3/UL
IMM GRANULOCYTES NFR BLD: 1 %
LYMPHOCYTES # BLD AUTO: 1.3 10E3/UL (ref 0.8–5.3)
LYMPHOCYTES NFR BLD AUTO: 15 %
MCH RBC QN AUTO: 30.5 PG (ref 26.5–33)
MCHC RBC AUTO-ENTMCNC: 34.7 G/DL (ref 31.5–36.5)
MCV RBC AUTO: 88 FL (ref 78–100)
MONOCYTES # BLD AUTO: 0.8 10E3/UL (ref 0–1.3)
MONOCYTES NFR BLD AUTO: 9 %
NEUTROPHILS # BLD AUTO: 6.9 10E3/UL (ref 1.6–8.3)
NEUTROPHILS NFR BLD AUTO: 75 %
NRBC # BLD AUTO: 0 10E3/UL
NRBC BLD AUTO-RTO: 0 /100
PLATELET # BLD AUTO: 282 10E3/UL (ref 150–450)
POTASSIUM SERPL-SCNC: 4.1 MMOL/L (ref 3.4–5.3)
PROT SERPL-MCNC: 6.6 G/DL (ref 6.4–8.3)
PROT/CREAT 24H UR: 0.14 MG/MG CR (ref 0–0.2)
RBC # BLD AUTO: 4.13 10E6/UL (ref 3.8–5.2)
SODIUM SERPL-SCNC: 134 MMOL/L (ref 135–145)
WBC # BLD AUTO: 9.2 10E3/UL (ref 4–11)

## 2025-07-01 PROCEDURE — 84155 ASSAY OF PROTEIN SERUM: CPT | Mod: ZL | Performed by: NURSE PRACTITIONER

## 2025-07-01 PROCEDURE — 76816 OB US FOLLOW-UP PER FETUS: CPT | Mod: 26 | Performed by: RADIOLOGY

## 2025-07-01 PROCEDURE — 76819 FETAL BIOPHYS PROFIL W/O NST: CPT | Mod: 26 | Performed by: RADIOLOGY

## 2025-07-01 PROCEDURE — 36415 COLL VENOUS BLD VENIPUNCTURE: CPT | Mod: ZL | Performed by: NURSE PRACTITIONER

## 2025-07-01 PROCEDURE — 85004 AUTOMATED DIFF WBC COUNT: CPT | Mod: ZL | Performed by: NURSE PRACTITIONER

## 2025-07-01 PROCEDURE — 76816 OB US FOLLOW-UP PER FETUS: CPT

## 2025-07-01 PROCEDURE — 84156 ASSAY OF PROTEIN URINE: CPT | Mod: ZL | Performed by: NURSE PRACTITIONER

## 2025-07-01 RX ORDER — MAGNESIUM OXIDE 400 MG/1
400 TABLET ORAL AT BEDTIME
Qty: 30 TABLET | Refills: 1 | Status: SHIPPED | OUTPATIENT
Start: 2025-07-01

## 2025-07-01 ASSESSMENT — PAIN SCALES - GENERAL: PAINLEVEL_OUTOF10: NO PAIN (0)

## 2025-07-01 NOTE — NURSING NOTE
"Chief Complaint   Patient presents with    Prenatal Care     34 w 4d     Pt presents to clinic today for prenatal care 34w 4d. Pt denies any bleeding, or leakage of fluid at this time. States baby is moving good. Patient denies contractions.    Initial /74   Pulse 97   Wt 111.6 kg (246 lb)   LMP 11/01/2024 (Exact Date)   BMI 40.94 kg/m   Estimated body mass index is 40.94 kg/m  as calculated from the following:    Height as of 3/25/25: 1.651 m (5' 5\").    Weight as of this encounter: 111.6 kg (246 lb).      Melissa Gambino, CIELO    "

## 2025-07-01 NOTE — PROGRESS NOTES
CHIEF COMPLAINT:  Routine OB visit at 34w4d    HPI: Lyn Villaseñor presents for a routine OB visit at 34w4d. Patient had BPP earlier today.     She feels more swelling in her hands and feet over the past week. She also has been having intermittent headaches. She does have history of migraines without aura. Headaches improve with rest and chiro, tylenol does not help.   Blood pressures at home have been 145/87, 139/87 and 136/93- BP today in clinic is normal at 122/74    Patient notices normal fetal movement, denies contractions, vaginal bleeding or leaking of fluid.    O: /74   Pulse 97   Wt 111.6 kg (246 lb)   LMP 11/01/2024 (Exact Date)   BMI 40.94 kg/m    Body mass index is 40.94 kg/m .  See OB flow sheet  EXAM:   General: Alert, cooperative, clear coherent speech  Respiratory: Unlabored, no wheezing  Lower extremities: +1 bilateral upper (hands) and lower( feet) extremity edema, normal perfusion.     FHT:147 bpm per BPP  BPP: 8/8    Results for orders placed or performed in visit on 07/01/25   Protein  random urine     Status: None   Result Value Ref Range    Total Protein Urine mg/dL 22.2   mg/dL    Total Protein Urine mg/mg Creat 0.14 0.00 - 0.20 mg/mg Cr    Creatinine Urine mg/dL 159.3 mg/dL   Comprehensive Metabolic Panel     Status: Abnormal   Result Value Ref Range    Sodium 134 (L) 135 - 145 mmol/L    Potassium 4.1 3.4 - 5.3 mmol/L    Carbon Dioxide (CO2) 22 22 - 29 mmol/L    Anion Gap 11 7 - 15 mmol/L    Urea Nitrogen 4.2 (L) 6.0 - 20.0 mg/dL    Creatinine 0.45 (L) 0.51 - 0.95 mg/dL    GFR Estimate >90 >60 mL/min/1.73m2    Calcium 8.6 (L) 8.8 - 10.4 mg/dL    Chloride 101 98 - 107 mmol/L    Glucose 75 70 - 99 mg/dL    Alkaline Phosphatase 134 40 - 150 U/L    AST 23 0 - 45 U/L    ALT 16 0 - 50 U/L    Protein Total 6.6 6.4 - 8.3 g/dL    Albumin 3.2 (L) 3.5 - 5.2 g/dL    Bilirubin Total 0.2 <=1.2 mg/dL   CBC with platelets and differential     Status: None   Result Value Ref Range     WBC Count 9.2 4.0 - 11.0 10e3/uL    RBC Count 4.13 3.80 - 5.20 10e6/uL    Hemoglobin 12.6 11.7 - 15.7 g/dL    Hematocrit 36.3 35.0 - 47.0 %    MCV 88 78 - 100 fL    MCH 30.5 26.5 - 33.0 pg    MCHC 34.7 31.5 - 36.5 g/dL    RDW 13.8 10.0 - 15.0 %    Platelet Count 282 150 - 450 10e3/uL    % Neutrophils 75 %    % Lymphocytes 15 %    % Monocytes 9 %    % Eosinophils 1 %    % Basophils 0 %    % Immature Granulocytes 1 %    NRBCs per 100 WBC 0 <1 /100    Absolute Neutrophils 6.9 1.6 - 8.3 10e3/uL    Absolute Lymphocytes 1.3 0.8 - 5.3 10e3/uL    Absolute Monocytes 0.8 0.0 - 1.3 10e3/uL    Absolute Eosinophils 0.1 0.0 - 0.7 10e3/uL    Absolute Basophils 0.0 0.0 - 0.2 10e3/uL    Absolute Immature Granulocytes 0.1 <=0.4 10e3/uL    Absolute NRBCs 0.0 10e3/uL   CBC and Differential     Status: None    Narrative    The following orders were created for panel order CBC and Differential.  Procedure                               Abnormality         Status                     ---------                               -----------         ------                     CBC with platelets and ...[[id number]]                      Final result                 Please view results for these tests on the individual orders.   Results for orders placed or performed during the hospital encounter of 07/01/25   US OB Biophys Single Gestation Measure     Status: None    Narrative    HISTORY:  Weekly BPP @32W with serial growth every 4 weeks;  Supervision of high risk pregnancy in second trimester     COMPARISON: 6/24/2025, 6/5/2025    Gestation:  1  Presentation: Cephalic    Cardiac Activity:  Regular  BPM:  147    Placenta: Posterior, grade 3  Previa:  Not assessed    Cervix:  Not assessed     Amniotic fluid (MVP):  5.5 cm    Measurements:    BPD:  34 weeks 4 days  HC:  37 weeks 0 days  AC:  36 weeks 2 days  FL:  34 weeks 1 days    Estimated Fetal Weight:  2706 grams    US age:  35 weeks 4 days  Gestational Age by LMP:  34 weeks 4 days  US EDC  (Current Study):  2025   %WT for EGA  (Based on First Study):  74 %    Biophysical profile score is 8 out of 8.      Impression    IMPRESSION:   1. Biophysical profile score is 8 out of 8.  2. Appropriate interval fetal growth. Estimated fetal weight is 2706 g  which is at the 74th percentile.      SOFIE LAWSON MD         SYSTEM ID:  [id number]       ASSESSMENT/PLAN:     Lyn Villaseñor is a 24 year old  at 34w4d by 32w4d by LMP c/w 9w1d US, here for return OB visit.    # Migraines, more frequent  - zofran, benadryl and a nap  - magnesium at bedtime     # cHTN, lisinopril exposure in early pregnancy, obesity  - On nifedipine 30 mg daily, ASA 81 mg, baseline Pre E labs WNL, will recheck today with more frequent headaches and hand and feet swelling  - Continue to check BP twice daily at home- will bring cuff in to calibrate at next visit. Discuss return precautions and parameters when to notify us.  - Had level II US, normal anatomy  - Continue q4 week growth US,  74% today, next Growth    - Weekly BPP's,  today  - Delivery timing pending BP control at end of pregnancy (between 37+0 and 39+6)  - Body mass index is 40.52 kg/m . Has gained 30 pounds so far this pregnancy     Prenatal WNL, Rh +, Rubella immune, , 3rd tri Hgb 11.6  Genetics: low risk NIPS and Carrier   Imagin week dating, normal anatomy, posterior placenta grade 3  Immunizations: TDAP today (), RSV TBD  Delivery Plan: Pain control per request, breast feeding. Lactation referral placed.  BC after delivery: Thinking IUD vs condoms  Baby provider: Dr. Pop Jeffers     RTC 2 weeks, continue weekly BPP, discuss delivery plan at next visit    ROLANDO Bell., R.N., APRN CNP  2025

## 2025-07-03 ENCOUNTER — ALLIED HEALTH/NURSE VISIT (OUTPATIENT)
Dept: FAMILY MEDICINE | Facility: OTHER | Age: 25
End: 2025-07-03
Attending: FAMILY MEDICINE
Payer: COMMERCIAL

## 2025-07-03 ENCOUNTER — HOSPITAL ENCOUNTER (OUTPATIENT)
Facility: OTHER | Age: 25
Discharge: HOME | End: 2025-07-03
Attending: OBSTETRICS & GYNECOLOGY | Admitting: OBSTETRICS & GYNECOLOGY
Payer: COMMERCIAL

## 2025-07-03 VITALS
HEART RATE: 87 BPM | DIASTOLIC BLOOD PRESSURE: 62 MMHG | SYSTOLIC BLOOD PRESSURE: 113 MMHG | RESPIRATION RATE: 16 BRPM | OXYGEN SATURATION: 95 % | TEMPERATURE: 97.3 F

## 2025-07-03 VITALS — DIASTOLIC BLOOD PRESSURE: 102 MMHG | SYSTOLIC BLOOD PRESSURE: 148 MMHG | HEART RATE: 88 BPM

## 2025-07-03 DIAGNOSIS — I10 PRIMARY HYPERTENSION: Primary | ICD-10-CM

## 2025-07-03 DIAGNOSIS — Z36.89 ENCOUNTER FOR TRIAGE IN PREGNANT PATIENT: Primary | ICD-10-CM

## 2025-07-03 PROCEDURE — 999N000104 HC STATISTIC NO CHARGE: Performed by: FAMILY MEDICINE

## 2025-07-03 PROCEDURE — G0463 HOSPITAL OUTPT CLINIC VISIT: HCPCS

## 2025-07-03 PROCEDURE — G0463 HOSPITAL OUTPT CLINIC VISIT: HCPCS | Mod: 25

## 2025-07-03 PROCEDURE — 250N000013 HC RX MED GY IP 250 OP 250 PS 637: Performed by: OBSTETRICS & GYNECOLOGY

## 2025-07-03 RX ORDER — ACETAMINOPHEN 325 MG/1
975 TABLET ORAL EVERY 6 HOURS PRN
Status: DISCONTINUED | OUTPATIENT
Start: 2025-07-03 | End: 2025-07-03 | Stop reason: HOSPADM

## 2025-07-03 RX ORDER — HYDROXYZINE PAMOATE 25 MG/1
50 CAPSULE ORAL ONCE
Status: COMPLETED | OUTPATIENT
Start: 2025-07-03 | End: 2025-07-03

## 2025-07-03 RX ORDER — LIDOCAINE 40 MG/G
CREAM TOPICAL
Status: DISCONTINUED | OUTPATIENT
Start: 2025-07-03 | End: 2025-07-03 | Stop reason: HOSPADM

## 2025-07-03 RX ADMIN — HYDROXYZINE PAMOATE 50 MG: 25 CAPSULE ORAL at 12:15

## 2025-07-03 RX ADMIN — ACETAMINOPHEN 975 MG: 325 TABLET ORAL at 12:15

## 2025-07-03 ASSESSMENT — ACTIVITIES OF DAILY LIVING (ADL)
ADLS_ACUITY_SCORE: 41
ADLS_ACUITY_SCORE: 41

## 2025-07-03 NOTE — ED TRIAGE NOTES
Pt is 34 weeks 6 days pregnant and was sen at the clinic for a check up and they had high BP readings.  First BP was 130/100 and second was 148/102.  Pt sent to ER for further evaluation.  First BP in ER was 138/88 @ 1113 and second one was 127/84 @1120 both on left arm.  Right arm /87

## 2025-07-03 NOTE — PROGRESS NOTES
Data: [facility]: 7/3/2025 11:23 AM.  Reason for maternal/fetal assessment is elevated blood pressures and acid reflux. Patient reports headache 7/10. Patient denies uterine contractions, leaking of vaginal fluid/rupture of membranes, vaginal bleeding. Patient reports fetal movement is normal. Patient is a 34w6d .  Prenatal record reviewed. Pregnancy has been complicated by hypertension.    Vital signs wnl. Support person is present.     Action: Verbal consent for EFM. Triage assessment completed.     Response: Patient verbalized agreement with plan. Will contact Dr. Mandujano with update and further orders.

## 2025-07-03 NOTE — PROGRESS NOTES
Dr. Mandujano gave order to discharge pt to home. Follow up appointment next week on Tuesday prior to US. Pt will continue to monitor her BP at home. She states that her headache has gotten much better. She feels comfortable discharging to home with significant other.

## 2025-07-03 NOTE — PROGRESS NOTES
Lyn Villaseñor is a 24 year old year old patient who comes in today for a Blood Pressure check because of check home machine.  Vital Signs as repeated by KENDRA Milner RN  Patient is taking medication as prescribed  Patient is tolerating medications well.  Patient is monitoring Blood Pressure at home.  Average readings if yes are 140/90  Current complaints: cough, headaches, and light-headedness  Disposition:  patient reminded to call as needed        Wait 5 minutes   Sitting bp   Reg adult cuff          Pulse:88  Pb:130/100        5 minute later  Bp 148/102

## 2025-07-03 NOTE — PROGRESS NOTES
Telephone call placed to Dr. Mandujano to update on pt's arrival/status/complaints of elevated BP's at home and current BP's, headache, swelling. Reviewed labs that were done on 7/1 as well as pt's diagnosis of chronic HTN. Med orders obtained, will watch BP for 1 hour and reevaluate. Will schedule pt for clinic follow up next week also as pt does not have appointment until July 15.

## 2025-07-08 ENCOUNTER — HOSPITAL ENCOUNTER (OUTPATIENT)
Dept: ULTRASOUND IMAGING | Facility: OTHER | Age: 25
Discharge: HOME | End: 2025-07-08
Attending: OBSTETRICS & GYNECOLOGY
Payer: COMMERCIAL

## 2025-07-08 ENCOUNTER — ALLIED HEALTH/NURSE VISIT (OUTPATIENT)
Dept: OBGYN | Facility: OTHER | Age: 25
End: 2025-07-08
Attending: NURSE PRACTITIONER
Payer: COMMERCIAL

## 2025-07-08 VITALS — DIASTOLIC BLOOD PRESSURE: 88 MMHG | HEART RATE: 106 BPM | SYSTOLIC BLOOD PRESSURE: 136 MMHG

## 2025-07-08 DIAGNOSIS — O09.92 SUPERVISION OF HIGH RISK PREGNANCY IN SECOND TRIMESTER: ICD-10-CM

## 2025-07-08 DIAGNOSIS — O10.919 CHRONIC HYPERTENSION AFFECTING PREGNANCY: Primary | ICD-10-CM

## 2025-07-08 DIAGNOSIS — Z34.93 THIRD TRIMESTER PREGNANCY: ICD-10-CM

## 2025-07-08 DIAGNOSIS — Z32.01 POSITIVE PREGNANCY TEST: ICD-10-CM

## 2025-07-08 DIAGNOSIS — I10 PRIMARY HYPERTENSION: ICD-10-CM

## 2025-07-08 LAB
ALBUMIN MFR UR ELPH: 29 MG/DL
ALT SERPL W P-5'-P-CCNC: 18 U/L (ref 0–50)
AST SERPL W P-5'-P-CCNC: 21 U/L (ref 0–45)
CREAT SERPL-MCNC: 0.45 MG/DL (ref 0.51–0.95)
CREAT UR-MCNC: 145.3 MG/DL
EGFRCR SERPLBLD CKD-EPI 2021: >90 ML/MIN/1.73M2
ERYTHROCYTE [DISTWIDTH] IN BLOOD BY AUTOMATED COUNT: 13.7 % (ref 10–15)
HCT VFR BLD AUTO: 36.4 % (ref 35–47)
HGB BLD-MCNC: 12.5 G/DL (ref 11.7–15.7)
MCH RBC QN AUTO: 30.5 PG (ref 26.5–33)
MCHC RBC AUTO-ENTMCNC: 34.3 G/DL (ref 31.5–36.5)
MCV RBC AUTO: 89 FL (ref 78–100)
PLATELET # BLD AUTO: 280 10E3/UL (ref 150–450)
PROT/CREAT 24H UR: 0.2 MG/MG CR (ref 0–0.2)
RBC # BLD AUTO: 4.1 10E6/UL (ref 3.8–5.2)
WBC # BLD AUTO: 9.4 10E3/UL (ref 4–11)

## 2025-07-08 PROCEDURE — 85027 COMPLETE CBC AUTOMATED: CPT | Mod: ZL

## 2025-07-08 PROCEDURE — 36415 COLL VENOUS BLD VENIPUNCTURE: CPT | Mod: ZL

## 2025-07-08 PROCEDURE — 84156 ASSAY OF PROTEIN URINE: CPT | Mod: ZL

## 2025-07-08 PROCEDURE — 84450 TRANSFERASE (AST) (SGOT): CPT | Mod: ZL

## 2025-07-08 PROCEDURE — 76819 FETAL BIOPHYS PROFIL W/O NST: CPT | Mod: 26 | Performed by: RADIOLOGY

## 2025-07-08 PROCEDURE — 82565 ASSAY OF CREATININE: CPT | Mod: ZL

## 2025-07-08 PROCEDURE — 84460 ALANINE AMINO (ALT) (SGPT): CPT | Mod: ZL

## 2025-07-08 PROCEDURE — 76819 FETAL BIOPHYS PROFIL W/O NST: CPT

## 2025-07-08 RX ORDER — NIFEDIPINE 30 MG/1
60 TABLET, EXTENDED RELEASE ORAL DAILY
Qty: 90 TABLET | Refills: 3 | Status: SHIPPED | OUTPATIENT
Start: 2025-07-08

## 2025-07-08 ASSESSMENT — PAIN SCALES - GENERAL
PAINLEVEL_OUTOF10: MODERATE PAIN (5)
PAINLEVEL_OUTOF10: MODERATE PAIN (6)

## 2025-07-08 NOTE — PROGRESS NOTES
Lyn Villaseñor is a 24 year old year old patient  who comes in today for a blood pressure check because of ongoing blood pressure monitoring related to chronic hypertension in pregnancy. Patient brought in her monitor today to check accuracy and her blood pressure cuff was giving the same readings as this nurse. Patient had BPP today and was an .    Medication and allergy list reviewed with patient.  Patient is taking medication as prescribed  Patient is tolerating medications well.  Patient is monitoring Blood Pressure at home.  Average readings if yes are above range.   Current complaints: headaches and swelling.    Blood pressure results at this visit:  Vitals:    25 1003 25 1020   BP: (!) 140/96 136/84   Pulse: 102 106       Disposition:  huddled with provider, orders received from Dr. Huston for CBC, AST, ALT, Cr, UPC ratio today and increase nifedipine to 60 mg daily, patient reminded to call as needed. Patient verbalized understanding and is in agreement to this plan.     Angeline Palomares RN on 2025 at 10:48 AM

## 2025-07-15 ENCOUNTER — HOSPITAL ENCOUNTER (OUTPATIENT)
Dept: ULTRASOUND IMAGING | Facility: OTHER | Age: 25
Discharge: HOME | End: 2025-07-15
Attending: OBSTETRICS & GYNECOLOGY
Payer: COMMERCIAL

## 2025-07-15 ENCOUNTER — PRENATAL OFFICE VISIT (OUTPATIENT)
Dept: OBGYN | Facility: OTHER | Age: 25
End: 2025-07-15
Attending: OBSTETRICS & GYNECOLOGY
Payer: COMMERCIAL

## 2025-07-15 VITALS
BODY MASS INDEX: 41.89 KG/M2 | DIASTOLIC BLOOD PRESSURE: 86 MMHG | WEIGHT: 251.7 LBS | HEART RATE: 116 BPM | SYSTOLIC BLOOD PRESSURE: 134 MMHG

## 2025-07-15 DIAGNOSIS — O99.210 OBESITY DURING PREGNANCY: ICD-10-CM

## 2025-07-15 DIAGNOSIS — Z3A.36 36 WEEKS GESTATION OF PREGNANCY: ICD-10-CM

## 2025-07-15 DIAGNOSIS — O09.92 SUPERVISION OF HIGH RISK PREGNANCY IN SECOND TRIMESTER: ICD-10-CM

## 2025-07-15 DIAGNOSIS — O09.93 SUPERVISION OF HIGH RISK PREGNANCY IN THIRD TRIMESTER: Primary | ICD-10-CM

## 2025-07-15 DIAGNOSIS — O10.919 CHRONIC HYPERTENSION AFFECTING PREGNANCY: ICD-10-CM

## 2025-07-15 DIAGNOSIS — Z34.93 THIRD TRIMESTER PREGNANCY: Primary | ICD-10-CM

## 2025-07-15 LAB
ALBUMIN MFR UR ELPH: 27.4 MG/DL
ALT SERPL W P-5'-P-CCNC: 19 U/L (ref 0–50)
AST SERPL W P-5'-P-CCNC: 24 U/L (ref 0–45)
CREAT SERPL-MCNC: 0.47 MG/DL (ref 0.51–0.95)
CREAT UR-MCNC: 150.5 MG/DL
EGFRCR SERPLBLD CKD-EPI 2021: >90 ML/MIN/1.73M2
ERYTHROCYTE [DISTWIDTH] IN BLOOD BY AUTOMATED COUNT: 13.7 % (ref 10–15)
HCT VFR BLD AUTO: 37.3 % (ref 35–47)
HGB BLD-MCNC: 12.8 G/DL (ref 11.7–15.7)
MCH RBC QN AUTO: 30.5 PG (ref 26.5–33)
MCHC RBC AUTO-ENTMCNC: 34.3 G/DL (ref 31.5–36.5)
MCV RBC AUTO: 89 FL (ref 78–100)
PLATELET # BLD AUTO: 295 10E3/UL (ref 150–450)
PROT/CREAT 24H UR: 0.18 MG/MG CR (ref 0–0.2)
RBC # BLD AUTO: 4.19 10E6/UL (ref 3.8–5.2)
WBC # BLD AUTO: 10.4 10E3/UL (ref 4–11)

## 2025-07-15 PROCEDURE — 3075F SYST BP GE 130 - 139MM HG: CPT | Performed by: OBSTETRICS & GYNECOLOGY

## 2025-07-15 PROCEDURE — 82565 ASSAY OF CREATININE: CPT | Mod: ZL | Performed by: OBSTETRICS & GYNECOLOGY

## 2025-07-15 PROCEDURE — 3079F DIAST BP 80-89 MM HG: CPT | Performed by: OBSTETRICS & GYNECOLOGY

## 2025-07-15 PROCEDURE — 84450 TRANSFERASE (AST) (SGOT): CPT | Mod: ZL | Performed by: OBSTETRICS & GYNECOLOGY

## 2025-07-15 PROCEDURE — 84156 ASSAY OF PROTEIN URINE: CPT | Mod: ZL | Performed by: OBSTETRICS & GYNECOLOGY

## 2025-07-15 PROCEDURE — 87653 STREP B DNA AMP PROBE: CPT | Mod: ZL | Performed by: OBSTETRICS & GYNECOLOGY

## 2025-07-15 PROCEDURE — 85027 COMPLETE CBC AUTOMATED: CPT | Mod: ZL | Performed by: OBSTETRICS & GYNECOLOGY

## 2025-07-15 PROCEDURE — 36415 COLL VENOUS BLD VENIPUNCTURE: CPT | Mod: ZL | Performed by: OBSTETRICS & GYNECOLOGY

## 2025-07-15 PROCEDURE — 76819 FETAL BIOPHYS PROFIL W/O NST: CPT

## 2025-07-15 PROCEDURE — 76819 FETAL BIOPHYS PROFIL W/O NST: CPT | Mod: 26 | Performed by: RADIOLOGY

## 2025-07-15 PROCEDURE — 0502F SUBSEQUENT PRENATAL CARE: CPT | Performed by: OBSTETRICS & GYNECOLOGY

## 2025-07-15 PROCEDURE — 1126F AMNT PAIN NOTED NONE PRSNT: CPT | Performed by: OBSTETRICS & GYNECOLOGY

## 2025-07-15 PROCEDURE — 99207 PR OB VISIT-NO CHARGE - GICH ONLY: CPT | Performed by: OBSTETRICS & GYNECOLOGY

## 2025-07-15 PROCEDURE — 84460 ALANINE AMINO (ALT) (SGPT): CPT | Mod: ZL | Performed by: OBSTETRICS & GYNECOLOGY

## 2025-07-15 ASSESSMENT — PAIN SCALES - GENERAL: PAINLEVEL_OUTOF10: NO PAIN (0)

## 2025-07-15 NOTE — NURSING NOTE
"Chief Complaint   Patient presents with    Prenatal Care     36w4d     Pt presents to clinic today for prenatal care 36w4d . Pt denies any bleeding, or leakage of fluid at this time. States baby is moving good. Patient denies contractions.     Initial /86 (BP Location: Right arm, Patient Position: Sitting, Cuff Size: Adult Large)   Pulse 116   Wt 114.2 kg (251 lb 11.2 oz)   LMP 11/01/2024 (Exact Date)   BMI 41.89 kg/m   Estimated body mass index is 41.89 kg/m  as calculated from the following:    Height as of 3/25/25: 1.651 m (5' 5\").    Weight as of this encounter: 114.2 kg (251 lb 11.2 oz).  Medication Review: complete    The next two questions are to help us understand your food security.  If you are feeling you need any assistance in this area, we have resources available to support you today.          6/26/2024   SDOH- Food Insecurity   Within the past 12 months, did you worry that your food would run out before you got money to buy more? N   Within the past 12 months, did the food you bought just not last and you didn t have money to get more? N        Data saved with a previous flowsheet row definition         Health Care Directive:  Patient does not have a Health Care Directive: Discussed advance care planning with patient; however, patient declined at this time.    Jud Newman, CIELO      "

## 2025-07-16 LAB — GP B STREP DNA SPEC QL NAA+PROBE: NEGATIVE

## 2025-07-17 ENCOUNTER — HOSPITAL ENCOUNTER (OUTPATIENT)
Facility: OTHER | Age: 25
Discharge: HOME | End: 2025-07-17
Attending: OBSTETRICS & GYNECOLOGY | Admitting: OBSTETRICS & GYNECOLOGY
Payer: COMMERCIAL

## 2025-07-17 VITALS
HEART RATE: 102 BPM | RESPIRATION RATE: 16 BRPM | DIASTOLIC BLOOD PRESSURE: 81 MMHG | OXYGEN SATURATION: 99 % | TEMPERATURE: 96.5 F | SYSTOLIC BLOOD PRESSURE: 128 MMHG

## 2025-07-17 LAB
ALBUMIN MFR UR ELPH: 31.4 MG/DL
ALBUMIN SERPL BCG-MCNC: 3.5 G/DL (ref 3.5–5.2)
ALBUMIN UR-MCNC: 20 MG/DL
ALP SERPL-CCNC: 146 U/L (ref 40–150)
ALT SERPL W P-5'-P-CCNC: 16 U/L (ref 0–50)
ANION GAP SERPL CALCULATED.3IONS-SCNC: 15 MMOL/L (ref 7–15)
APPEARANCE UR: ABNORMAL
AST SERPL W P-5'-P-CCNC: 20 U/L (ref 0–45)
BACTERIA #/AREA URNS HPF: ABNORMAL /HPF
BILIRUB SERPL-MCNC: 0.2 MG/DL
BILIRUB UR QL STRIP: NEGATIVE
BUN SERPL-MCNC: 6.4 MG/DL (ref 6–20)
CALCIUM SERPL-MCNC: 9.2 MG/DL (ref 8.8–10.4)
CHLORIDE SERPL-SCNC: 101 MMOL/L (ref 98–107)
COLOR UR AUTO: YELLOW
CREAT SERPL-MCNC: 0.45 MG/DL (ref 0.51–0.95)
CREAT UR-MCNC: 183.4 MG/DL
EGFRCR SERPLBLD CKD-EPI 2021: >90 ML/MIN/1.73M2
ERYTHROCYTE [DISTWIDTH] IN BLOOD BY AUTOMATED COUNT: 13.6 % (ref 10–15)
GLUCOSE SERPL-MCNC: 77 MG/DL (ref 70–99)
GLUCOSE UR STRIP-MCNC: NEGATIVE MG/DL
HCO3 SERPL-SCNC: 20 MMOL/L (ref 22–29)
HCT VFR BLD AUTO: 37.8 % (ref 35–47)
HGB BLD-MCNC: 13.2 G/DL (ref 11.7–15.7)
HGB UR QL STRIP: NEGATIVE
KETONES UR STRIP-MCNC: NEGATIVE MG/DL
LEUKOCYTE ESTERASE UR QL STRIP: ABNORMAL
MCH RBC QN AUTO: 30.8 PG (ref 26.5–33)
MCHC RBC AUTO-ENTMCNC: 34.9 G/DL (ref 31.5–36.5)
MCV RBC AUTO: 88 FL (ref 78–100)
MUCOUS THREADS #/AREA URNS LPF: PRESENT /LPF
NITRATE UR QL: NEGATIVE
PH UR STRIP: 6.5 [PH] (ref 5–9)
PLATELET # BLD AUTO: 228 10E3/UL (ref 150–450)
POTASSIUM SERPL-SCNC: 3.9 MMOL/L (ref 3.4–5.3)
PROT SERPL-MCNC: 6.8 G/DL (ref 6.4–8.3)
PROT/CREAT 24H UR: 0.17 MG/MG CR (ref 0–0.2)
RBC # BLD AUTO: 4.29 10E6/UL (ref 3.8–5.2)
RBC URINE: 4 /HPF
SODIUM SERPL-SCNC: 136 MMOL/L (ref 135–145)
SP GR UR STRIP: 1.02 (ref 1–1.03)
SQUAMOUS EPITHELIAL: 8 /HPF
UROBILINOGEN UR STRIP-MCNC: NORMAL MG/DL
WBC # BLD AUTO: 9.7 10E3/UL (ref 4–11)
WBC URINE: 16 /HPF

## 2025-07-17 PROCEDURE — 84155 ASSAY OF PROTEIN SERUM: CPT | Performed by: OBSTETRICS & GYNECOLOGY

## 2025-07-17 PROCEDURE — 93010 ELECTROCARDIOGRAM REPORT: CPT | Performed by: INTERNAL MEDICINE

## 2025-07-17 PROCEDURE — G0463 HOSPITAL OUTPT CLINIC VISIT: HCPCS | Mod: 25

## 2025-07-17 PROCEDURE — 96372 THER/PROPH/DIAG INJ SC/IM: CPT | Performed by: OBSTETRICS & GYNECOLOGY

## 2025-07-17 PROCEDURE — 85027 COMPLETE CBC AUTOMATED: CPT | Performed by: OBSTETRICS & GYNECOLOGY

## 2025-07-17 PROCEDURE — 36415 COLL VENOUS BLD VENIPUNCTURE: CPT | Performed by: OBSTETRICS & GYNECOLOGY

## 2025-07-17 PROCEDURE — 250N000011 HC RX IP 250 OP 636: Performed by: OBSTETRICS & GYNECOLOGY

## 2025-07-17 PROCEDURE — 999N000104 HC STATISTIC NO CHARGE

## 2025-07-17 PROCEDURE — 81001 URINALYSIS AUTO W/SCOPE: CPT | Performed by: EMERGENCY MEDICINE

## 2025-07-17 PROCEDURE — 99214 OFFICE O/P EST MOD 30 MIN: CPT | Performed by: OBSTETRICS & GYNECOLOGY

## 2025-07-17 PROCEDURE — 84156 ASSAY OF PROTEIN URINE: CPT | Performed by: OBSTETRICS & GYNECOLOGY

## 2025-07-17 PROCEDURE — 93005 ELECTROCARDIOGRAM TRACING: CPT

## 2025-07-17 PROCEDURE — 87086 URINE CULTURE/COLONY COUNT: CPT | Performed by: EMERGENCY MEDICINE

## 2025-07-17 RX ORDER — BETAMETHASONE SODIUM PHOSPHATE AND BETAMETHASONE ACETATE 3; 3 MG/ML; MG/ML
12 INJECTION, SUSPENSION INTRA-ARTICULAR; INTRALESIONAL; INTRAMUSCULAR; SOFT TISSUE ONCE
Status: COMPLETED | OUTPATIENT
Start: 2025-07-17 | End: 2025-07-17

## 2025-07-17 RX ORDER — LIDOCAINE 40 MG/G
CREAM TOPICAL
Status: DISCONTINUED | OUTPATIENT
Start: 2025-07-17 | End: 2025-07-17 | Stop reason: HOSPADM

## 2025-07-17 RX ORDER — SODIUM CHLORIDE, SODIUM LACTATE, POTASSIUM CHLORIDE, CALCIUM CHLORIDE 600; 310; 30; 20 MG/100ML; MG/100ML; MG/100ML; MG/100ML
10-125 INJECTION, SOLUTION INTRAVENOUS CONTINUOUS
Status: DISCONTINUED | OUTPATIENT
Start: 2025-07-17 | End: 2025-07-17 | Stop reason: HOSPADM

## 2025-07-17 RX ORDER — LABETALOL HYDROCHLORIDE 5 MG/ML
20-80 INJECTION, SOLUTION INTRAVENOUS EVERY 10 MIN PRN
Status: DISCONTINUED | OUTPATIENT
Start: 2025-07-17 | End: 2025-07-17 | Stop reason: HOSPADM

## 2025-07-17 RX ORDER — HYDRALAZINE HYDROCHLORIDE 20 MG/ML
10 INJECTION INTRAMUSCULAR; INTRAVENOUS
Status: DISCONTINUED | OUTPATIENT
Start: 2025-07-17 | End: 2025-07-17 | Stop reason: HOSPADM

## 2025-07-17 RX ORDER — NIFEDIPINE 30 MG/1
60 TABLET, EXTENDED RELEASE ORAL DAILY
Status: DISCONTINUED | OUTPATIENT
Start: 2025-07-18 | End: 2025-07-17 | Stop reason: HOSPADM

## 2025-07-17 RX ADMIN — BETAMETHASONE SODIUM PHOSPHATE AND BETAMETHASONE ACETATE 12 MG: 3; 3 INJECTION, SUSPENSION INTRA-ARTICULAR; INTRALESIONAL; INTRAMUSCULAR at 15:16

## 2025-07-17 ASSESSMENT — ACTIVITIES OF DAILY LIVING (ADL)
ADLS_ACUITY_SCORE: 41

## 2025-07-17 NOTE — ED NOTES
Report given to Krupa RN on [facility], will obtain an EKG and then send to [facility]

## 2025-07-17 NOTE — ED TRIAGE NOTES
Pt here with SO, pt reports chronically high BP during pregnancy, pt states that she is 37 weeks and was told to come in with consistent HTN, pt reports some mild chest pain and intermittent cramping

## 2025-07-17 NOTE — PROGRESS NOTES
[facility]  OB Triage Note    CC: Elevated blood pressure, chest pressure    HPI: Ms. Lyn Villaseñor is a 24 year old  at 36w6d by Lmp c/w 9w1d US, who present for evaluation of elevated BP and chest pressure. She has chronic hypertension and her dose of nifedipine was increased to 60 mg daily last week. Over the past day, she has been noticing that her blood pressures have been more elevated at home.  She woke up around 0300 this morning and was having chest pain. She checked her blood pressure and it was initially 160/105. When she checked it again after a few minutes, it had improved to 150s/90s. She woke up with a migraine headache this morning which has since resolved.  She noted some sparklers in her vision last night, this hasn't been an issue today.  Has been having RUQ pain but also states her baby has been kicking there.  Continues to have some chest pain/pressure. Denies worsening SOB, edema. Denies regular contractions, leaking fluid, vaginal bleeding.  + Good fetal movement.    Obstetric Complications  - Chronic hypertension  - Obesity    O:  Patient Vitals for the past 24 hrs:   BP Temp Temp src Pulse Resp SpO2   25 1253 128/81 -- -- -- -- --   25 1236 124/75 -- -- -- -- --   25 1221 135/84 -- -- -- -- --   25 1206 132/84 -- -- -- -- --   25 1151 124/78 -- -- -- -- --   25 1134 132/82 -- -- -- -- --   25 1103 (!) 154/94 (!) 96.5  F (35.8  C) Tympanic 102 16 99 %     Gen: Well-appearing, NAD  CV: RRR, no murmurs. Tenderness to palpation over costochondral junctions. This bring on the pain she has been experiencing  Pulm: CTAB, no wheezes  Abd: Soft, gravid, non tender  Ext: trace LE edema b/l  Cvx: 60/-3, anterior soft    FHT: , moderate natividad, + accels, no decels  Guerra: 1 ctx in 10 mins    Labs:  Component      Latest Ref Rng 2025  11:05 AM 2025  1:12 PM   Sodium      135 - 145 mmol/L  136    Potassium      3.4 - 5.3  mmol/L  3.9    Carbon Dioxide (CO2)      22 - 29 mmol/L  20 (L)    Anion Gap      7 - 15 mmol/L  15    Urea Nitrogen      6.0 - 20.0 mg/dL  6.4    Creatinine      0.51 - 0.95 mg/dL  0.45 (L)    GFR Estimate      >60 mL/min/1.73m2  >90    Calcium      8.8 - 10.4 mg/dL  9.2    Chloride      98 - 107 mmol/L  101    Glucose      70 - 99 mg/dL  77    Alkaline Phosphatase      40 - 150 U/L  146    AST      0 - 45 U/L  20    ALT      0 - 50 U/L  16    Protein Total      6.4 - 8.3 g/dL  6.8    Albumin      3.5 - 5.2 g/dL  3.5    Bilirubin Total      <=1.2 mg/dL  0.2    WBC      4.0 - 11.0 10e3/uL  9.7    RBC Count      3.80 - 5.20 10e6/uL  4.29    Hemoglobin      11.7 - 15.7 g/dL  13.2    Hematocrit      35.0 - 47.0 %  37.8    MCV      78 - 100 fL  88    MCH      26.5 - 33.0 pg  30.8    MCHC      31.5 - 36.5 g/dL  34.9    RDW      10.0 - 15.0 %  13.6    Platelet Count      150 - 450 10e3/uL  228    Total Protein Urine mg/dL        mg/dL 31.4     Total Protein Urine mg/mg Creat      0.00 - 0.20 mg/mg Cr 0.17     Creatinine Urine      mg/dL 183.4        Legend:  (L) Low    A/P:  Ms. Lyn Villaseñor is a 24 year old  at 36w6d by LMP c/w 9w1d US here for evaluation of worsening blood pressures and chest pain.    # Chronic hypertension  - HELLP labs WNL  - Initial BP was high mild range, subsequent BP have been WNL  - Continue nifedipine 60 mg once daily  - Continue to check BP at least 2 times daily at [facility]  - She will call each day at 1400 to discuss her BP and we will arrange an IOL when staffing allows  - Recommended return if she has any further BP > 160/110    # Chest pain  - Symptoms consistent with costochondritis  - EKG obtained in [facility]    # FWB: - Category 1 FHT  - Will given dose of BMZ now in anticipation of an IOL in the very near future    Nadine Huston MD

## 2025-07-17 NOTE — DISCHARGE INSTRUCTIONS
Please call the B everyday at 4:00pm with your blood pressure readings. Nurse will instruct you when to present to B unit.     [facility] Phone number [phone].     If [facility] is unable to accommodate ripening into next day induction on Friday 7/18/25 and Blood pressures are still within normal limits, call back next day at 4pm to report BP reading again.

## 2025-07-18 LAB
ATRIAL RATE - MUSE: 93 BPM
DIASTOLIC BLOOD PRESSURE - MUSE: NORMAL MMHG
INTERPRETATION ECG - MUSE: NORMAL
P AXIS - MUSE: 40 DEGREES
PR INTERVAL - MUSE: 142 MS
QRS DURATION - MUSE: 82 MS
QT - MUSE: 346 MS
QTC - MUSE: 430 MS
R AXIS - MUSE: 34 DEGREES
SYSTOLIC BLOOD PRESSURE - MUSE: NORMAL MMHG
T AXIS - MUSE: 9 DEGREES
VENTRICULAR RATE- MUSE: 93 BPM

## 2025-07-18 NOTE — PROGRESS NOTES
Patient called [facility] to report blood pressures for today.      She reports:    0800 120/86  1400 142/94     She denies headache, blurred vision, increased swelling or chest pain.  She states she feels nothing like her symptoms from yesterday.  She is not experiencing chest pains.      Patient requests induction tomorrow evening so that she is able to attend her sister's graduation party.      Discussed with Dr. Brewer who is on call. Patient will arrive tomorrow at 6/6:30 for medical ripening due to chronic hypertension.  She is aware.

## 2025-07-19 ENCOUNTER — HOSPITAL ENCOUNTER (INPATIENT)
Facility: OTHER | Age: 25
LOS: 4 days | Discharge: HOME | End: 2025-07-23
Attending: OBSTETRICS & GYNECOLOGY | Admitting: OBSTETRICS & GYNECOLOGY
Payer: COMMERCIAL

## 2025-07-19 PROBLEM — O10.919 HTN IN PREGNANCY, CHRONIC: Status: ACTIVE | Noted: 2025-07-19

## 2025-07-19 LAB
ABO + RH BLD: NORMAL
ALBUMIN SERPL BCG-MCNC: 3.4 G/DL (ref 3.5–5.2)
ALBUMIN UR-MCNC: 20 MG/DL
ALP SERPL-CCNC: 131 U/L (ref 40–150)
ALT SERPL W P-5'-P-CCNC: 14 U/L (ref 0–50)
ANION GAP SERPL CALCULATED.3IONS-SCNC: 14 MMOL/L (ref 7–15)
APPEARANCE UR: CLEAR
AST SERPL W P-5'-P-CCNC: 27 U/L (ref 0–45)
BACTERIA #/AREA URNS HPF: ABNORMAL /HPF
BACTERIA UR CULT: NORMAL
BILIRUB SERPL-MCNC: <0.2 MG/DL
BILIRUB UR QL STRIP: NEGATIVE
BLD GP AB SCN SERPL QL: NEGATIVE
BUN SERPL-MCNC: 9.1 MG/DL (ref 6–20)
CALCIUM SERPL-MCNC: 8.7 MG/DL (ref 8.8–10.4)
CHLORIDE SERPL-SCNC: 105 MMOL/L (ref 98–107)
COLOR UR AUTO: YELLOW
CREAT SERPL-MCNC: 0.49 MG/DL (ref 0.51–0.95)
EGFRCR SERPLBLD CKD-EPI 2021: >90 ML/MIN/1.73M2
ERYTHROCYTE [DISTWIDTH] IN BLOOD BY AUTOMATED COUNT: 13.5 % (ref 10–15)
GLUCOSE SERPL-MCNC: 86 MG/DL (ref 70–99)
GLUCOSE UR STRIP-MCNC: NEGATIVE MG/DL
HCO3 SERPL-SCNC: 20 MMOL/L (ref 22–29)
HCT VFR BLD AUTO: 32 % (ref 35–47)
HGB BLD-MCNC: 11.2 G/DL (ref 11.7–15.7)
HGB UR QL STRIP: NEGATIVE
KETONES UR STRIP-MCNC: NEGATIVE MG/DL
LEUKOCYTE ESTERASE UR QL STRIP: NEGATIVE
MCH RBC QN AUTO: 30.8 PG (ref 26.5–33)
MCHC RBC AUTO-ENTMCNC: 35 G/DL (ref 31.5–36.5)
MCV RBC AUTO: 88 FL (ref 78–100)
MUCOUS THREADS #/AREA URNS LPF: PRESENT /LPF
NITRATE UR QL: NEGATIVE
PH UR STRIP: 6 [PH] (ref 5–9)
PLATELET # BLD AUTO: 272 10E3/UL (ref 150–450)
POTASSIUM SERPL-SCNC: 3.6 MMOL/L (ref 3.4–5.3)
PROT SERPL-MCNC: 6.3 G/DL (ref 6.4–8.3)
RBC # BLD AUTO: 3.64 10E6/UL (ref 3.8–5.2)
RBC URINE: <1 /HPF
SODIUM SERPL-SCNC: 139 MMOL/L (ref 135–145)
SP GR UR STRIP: 1.03 (ref 1–1.03)
SPECIMEN EXP DATE BLD: NORMAL
UROBILINOGEN UR STRIP-MCNC: NORMAL MG/DL
WBC # BLD AUTO: 9 10E3/UL (ref 4–11)
WBC URINE: 1 /HPF

## 2025-07-19 PROCEDURE — 86780 TREPONEMA PALLIDUM: CPT | Performed by: OBSTETRICS & GYNECOLOGY

## 2025-07-19 PROCEDURE — 81003 URINALYSIS AUTO W/O SCOPE: CPT | Performed by: OBSTETRICS & GYNECOLOGY

## 2025-07-19 PROCEDURE — 250N000013 HC RX MED GY IP 250 OP 250 PS 637: Performed by: OBSTETRICS & GYNECOLOGY

## 2025-07-19 PROCEDURE — G0463 HOSPITAL OUTPT CLINIC VISIT: HCPCS | Mod: 25

## 2025-07-19 PROCEDURE — 3E0P7VZ INTRODUCTION OF HORMONE INTO FEMALE REPRODUCTIVE, VIA NATURAL OR ARTIFICIAL OPENING: ICD-10-PCS | Performed by: OBSTETRICS & GYNECOLOGY

## 2025-07-19 PROCEDURE — 86900 BLOOD TYPING SEROLOGIC ABO: CPT | Performed by: OBSTETRICS & GYNECOLOGY

## 2025-07-19 PROCEDURE — 82947 ASSAY GLUCOSE BLOOD QUANT: CPT | Performed by: OBSTETRICS & GYNECOLOGY

## 2025-07-19 PROCEDURE — 85027 COMPLETE CBC AUTOMATED: CPT | Performed by: OBSTETRICS & GYNECOLOGY

## 2025-07-19 PROCEDURE — 120N000001 HC R&B MED SURG/OB

## 2025-07-19 PROCEDURE — 81001 URINALYSIS AUTO W/SCOPE: CPT | Performed by: OBSTETRICS & GYNECOLOGY

## 2025-07-19 RX ORDER — NALOXONE HYDROCHLORIDE 0.4 MG/ML
0.4 INJECTION, SOLUTION INTRAMUSCULAR; INTRAVENOUS; SUBCUTANEOUS
Status: DISCONTINUED | OUTPATIENT
Start: 2025-07-19 | End: 2025-07-23 | Stop reason: HOSPADM

## 2025-07-19 RX ORDER — METOCLOPRAMIDE 10 MG/1
10 TABLET ORAL EVERY 6 HOURS PRN
Status: DISCONTINUED | OUTPATIENT
Start: 2025-07-19 | End: 2025-07-20 | Stop reason: HOSPADM

## 2025-07-19 RX ORDER — OXYTOCIN 10 [USP'U]/ML
10 INJECTION, SOLUTION INTRAMUSCULAR; INTRAVENOUS
Status: DISCONTINUED | OUTPATIENT
Start: 2025-07-19 | End: 2025-07-20 | Stop reason: HOSPADM

## 2025-07-19 RX ORDER — VITAMIN A, VITAMIN C, VITAMIN D-3, VITAMIN E, VITAMIN B-1, VITAMIN B-2, NIACIN, VITAMIN B-6, CALCIUM, IRON, ZINC, COPPER 4000; 120; 400; 22; 1.84; 3; 20; 10; 1; 12; 200; 27; 25; 2 [IU]/1; MG/1; [IU]/1; MG/1; MG/1; MG/1; MG/1; MG/1; MG/1; UG/1; MG/1; MG/1; MG/1; MG/1
1 TABLET ORAL DAILY
COMMUNITY

## 2025-07-19 RX ORDER — KETOROLAC TROMETHAMINE 15 MG/ML
15 INJECTION, SOLUTION INTRAMUSCULAR; INTRAVENOUS
Status: DISCONTINUED | OUTPATIENT
Start: 2025-07-19 | End: 2025-07-20

## 2025-07-19 RX ORDER — METHYLERGONOVINE MALEATE 0.2 MG/ML
200 INJECTION INTRAVENOUS
Status: DISCONTINUED | OUTPATIENT
Start: 2025-07-19 | End: 2025-07-20 | Stop reason: HOSPADM

## 2025-07-19 RX ORDER — MISOPROSTOL 100 UG/1
25 TABLET ORAL EVERY 4 HOURS
Status: DISCONTINUED | OUTPATIENT
Start: 2025-07-19 | End: 2025-07-20 | Stop reason: ALTCHOICE

## 2025-07-19 RX ORDER — IBUPROFEN 400 MG/1
800 TABLET, FILM COATED ORAL
Status: DISCONTINUED | OUTPATIENT
Start: 2025-07-19 | End: 2025-07-20

## 2025-07-19 RX ORDER — ONDANSETRON 2 MG/ML
4 INJECTION INTRAMUSCULAR; INTRAVENOUS EVERY 6 HOURS PRN
Status: DISCONTINUED | OUTPATIENT
Start: 2025-07-19 | End: 2025-07-20 | Stop reason: HOSPADM

## 2025-07-19 RX ORDER — LOPERAMIDE HYDROCHLORIDE 2 MG/1
4 CAPSULE ORAL
Status: DISCONTINUED | OUTPATIENT
Start: 2025-07-19 | End: 2025-07-20 | Stop reason: HOSPADM

## 2025-07-19 RX ORDER — TRANEXAMIC ACID 10 MG/ML
1 INJECTION, SOLUTION INTRAVENOUS EVERY 30 MIN PRN
Status: DISCONTINUED | OUTPATIENT
Start: 2025-07-19 | End: 2025-07-20 | Stop reason: HOSPADM

## 2025-07-19 RX ORDER — MISOPROSTOL 100 UG/1
400 TABLET ORAL
Status: DISCONTINUED | OUTPATIENT
Start: 2025-07-19 | End: 2025-07-20 | Stop reason: HOSPADM

## 2025-07-19 RX ORDER — LOPERAMIDE HYDROCHLORIDE 2 MG/1
2 CAPSULE ORAL
Status: DISCONTINUED | OUTPATIENT
Start: 2025-07-19 | End: 2025-07-20 | Stop reason: HOSPADM

## 2025-07-19 RX ORDER — FENTANYL CITRATE 50 UG/ML
50 INJECTION, SOLUTION INTRAMUSCULAR; INTRAVENOUS EVERY 30 MIN PRN
Refills: 0 | Status: DISCONTINUED | OUTPATIENT
Start: 2025-07-19 | End: 2025-07-20 | Stop reason: HOSPADM

## 2025-07-19 RX ORDER — OXYTOCIN/0.9 % SODIUM CHLORIDE 30/500 ML
340 PLASTIC BAG, INJECTION (ML) INTRAVENOUS CONTINUOUS PRN
Status: DISCONTINUED | OUTPATIENT
Start: 2025-07-19 | End: 2025-07-20 | Stop reason: HOSPADM

## 2025-07-19 RX ORDER — ACETAMINOPHEN 325 MG/1
650 TABLET ORAL EVERY 4 HOURS PRN
Status: DISCONTINUED | OUTPATIENT
Start: 2025-07-19 | End: 2025-07-20

## 2025-07-19 RX ORDER — CITRIC ACID/SODIUM CITRATE 334-500MG
30 SOLUTION, ORAL ORAL
Status: DISCONTINUED | OUTPATIENT
Start: 2025-07-19 | End: 2025-07-20 | Stop reason: HOSPADM

## 2025-07-19 RX ORDER — PROCHLORPERAZINE MALEATE 10 MG
10 TABLET ORAL EVERY 6 HOURS PRN
Status: DISCONTINUED | OUTPATIENT
Start: 2025-07-19 | End: 2025-07-20 | Stop reason: HOSPADM

## 2025-07-19 RX ORDER — NIFEDIPINE 30 MG/1
60 TABLET, EXTENDED RELEASE ORAL DAILY
Status: DISCONTINUED | OUTPATIENT
Start: 2025-07-19 | End: 2025-07-23 | Stop reason: HOSPADM

## 2025-07-19 RX ORDER — ONDANSETRON 4 MG/1
4 TABLET, ORALLY DISINTEGRATING ORAL EVERY 6 HOURS PRN
Status: DISCONTINUED | OUTPATIENT
Start: 2025-07-19 | End: 2025-07-20 | Stop reason: HOSPADM

## 2025-07-19 RX ORDER — OXYTOCIN 10 [USP'U]/ML
10 INJECTION, SOLUTION INTRAMUSCULAR; INTRAVENOUS
Status: DISCONTINUED | OUTPATIENT
Start: 2025-07-19 | End: 2025-07-23 | Stop reason: HOSPADM

## 2025-07-19 RX ORDER — CARBOPROST TROMETHAMINE 250 UG/ML
250 INJECTION, SOLUTION INTRAMUSCULAR
Status: DISCONTINUED | OUTPATIENT
Start: 2025-07-19 | End: 2025-07-20 | Stop reason: HOSPADM

## 2025-07-19 RX ORDER — TERBUTALINE SULFATE 1 MG/ML
0.25 INJECTION SUBCUTANEOUS
Status: DISCONTINUED | OUTPATIENT
Start: 2025-07-19 | End: 2025-07-20 | Stop reason: HOSPADM

## 2025-07-19 RX ORDER — NALOXONE HYDROCHLORIDE 0.4 MG/ML
0.2 INJECTION, SOLUTION INTRAMUSCULAR; INTRAVENOUS; SUBCUTANEOUS
Status: DISCONTINUED | OUTPATIENT
Start: 2025-07-19 | End: 2025-07-23 | Stop reason: HOSPADM

## 2025-07-19 RX ORDER — OXYTOCIN/0.9 % SODIUM CHLORIDE 30/500 ML
100-340 PLASTIC BAG, INJECTION (ML) INTRAVENOUS CONTINUOUS PRN
Status: DISCONTINUED | OUTPATIENT
Start: 2025-07-19 | End: 2025-07-23 | Stop reason: HOSPADM

## 2025-07-19 RX ORDER — METOCLOPRAMIDE HYDROCHLORIDE 5 MG/ML
10 INJECTION INTRAMUSCULAR; INTRAVENOUS EVERY 6 HOURS PRN
Status: DISCONTINUED | OUTPATIENT
Start: 2025-07-19 | End: 2025-07-20 | Stop reason: HOSPADM

## 2025-07-19 RX ADMIN — NIFEDIPINE 60 MG: 30 TABLET, FILM COATED, EXTENDED RELEASE ORAL at 21:07

## 2025-07-19 RX ADMIN — ACETAMINOPHEN 650 MG: 325 TABLET ORAL at 20:28

## 2025-07-19 RX ADMIN — MISOPROSTOL 25 MCG: 100 TABLET ORAL at 20:30

## 2025-07-19 ASSESSMENT — ACTIVITIES OF DAILY LIVING (ADL)
ADLS_ACUITY_SCORE: 15
ADLS_ACUITY_SCORE: 41
ADLS_ACUITY_SCORE: 41

## 2025-07-19 NOTE — PROGRESS NOTES
Patient arrived to [facility] with spouse, Cain for scheduled ripening. Patient and spouse oriented to care setting, EUM/EFM applied. See flow sheets for VS.

## 2025-07-20 ENCOUNTER — ANESTHESIA (OUTPATIENT)
Dept: OBGYN | Facility: OTHER | Age: 25
End: 2025-07-20
Payer: COMMERCIAL

## 2025-07-20 ENCOUNTER — ANESTHESIA EVENT (OUTPATIENT)
Dept: OBGYN | Facility: OTHER | Age: 25
End: 2025-07-20
Payer: COMMERCIAL

## 2025-07-20 LAB — T PALLIDUM AB SER QL: NONREACTIVE

## 2025-07-20 PROCEDURE — 258N000003 HC RX IP 258 OP 636: Performed by: NURSE ANESTHETIST, CERTIFIED REGISTERED

## 2025-07-20 PROCEDURE — 0U7C7DJ DILATION OF CERVIX WITH INTRALUMINAL DEVICE, TEMPORARY, VIA NATURAL OR ARTIFICIAL OPENING: ICD-10-PCS | Performed by: OBSTETRICS & GYNECOLOGY

## 2025-07-20 PROCEDURE — 250N000013 HC RX MED GY IP 250 OP 250 PS 637: Performed by: OBSTETRICS & GYNECOLOGY

## 2025-07-20 PROCEDURE — 0KQM0ZZ REPAIR PERINEUM MUSCLE, OPEN APPROACH: ICD-10-PCS | Performed by: EMERGENCY MEDICINE

## 2025-07-20 PROCEDURE — 250N000011 HC RX IP 250 OP 636: Performed by: NURSE ANESTHETIST, CERTIFIED REGISTERED

## 2025-07-20 PROCEDURE — 10907ZC DRAINAGE OF AMNIOTIC FLUID, THERAPEUTIC FROM PRODUCTS OF CONCEPTION, VIA NATURAL OR ARTIFICIAL OPENING: ICD-10-PCS | Performed by: OBSTETRICS & GYNECOLOGY

## 2025-07-20 PROCEDURE — 250N000009 HC RX 250: Performed by: OBSTETRICS & GYNECOLOGY

## 2025-07-20 PROCEDURE — 250N000009 HC RX 250: Performed by: NURSE ANESTHETIST, CERTIFIED REGISTERED

## 2025-07-20 PROCEDURE — 120N000001 HC R&B MED SURG/OB

## 2025-07-20 PROCEDURE — 370N000003 HC ANESTHESIA WARD SERVICE

## 2025-07-20 PROCEDURE — 250N000011 HC RX IP 250 OP 636: Performed by: OBSTETRICS & GYNECOLOGY

## 2025-07-20 RX ORDER — ONDANSETRON 2 MG/ML
4 INJECTION INTRAMUSCULAR; INTRAVENOUS EVERY 6 HOURS PRN
Status: DISCONTINUED | OUTPATIENT
Start: 2025-07-20 | End: 2025-07-20 | Stop reason: HOSPADM

## 2025-07-20 RX ORDER — OXYTOCIN/0.9 % SODIUM CHLORIDE 30/500 ML
1-24 PLASTIC BAG, INJECTION (ML) INTRAVENOUS CONTINUOUS
Status: DISCONTINUED | OUTPATIENT
Start: 2025-07-20 | End: 2025-07-20 | Stop reason: HOSPADM

## 2025-07-20 RX ORDER — IBUPROFEN 400 MG/1
800 TABLET, FILM COATED ORAL EVERY 6 HOURS PRN
Status: DISCONTINUED | OUTPATIENT
Start: 2025-07-20 | End: 2025-07-23 | Stop reason: HOSPADM

## 2025-07-20 RX ORDER — CARBOPROST TROMETHAMINE 250 UG/ML
250 INJECTION, SOLUTION INTRAMUSCULAR
Status: DISCONTINUED | OUTPATIENT
Start: 2025-07-20 | End: 2025-07-23 | Stop reason: HOSPADM

## 2025-07-20 RX ORDER — LABETALOL HYDROCHLORIDE 5 MG/ML
20-80 INJECTION, SOLUTION INTRAVENOUS EVERY 10 MIN PRN
Status: DISCONTINUED | OUTPATIENT
Start: 2025-07-20 | End: 2025-07-23 | Stop reason: HOSPADM

## 2025-07-20 RX ORDER — TRANEXAMIC ACID 10 MG/ML
1 INJECTION, SOLUTION INTRAVENOUS EVERY 30 MIN PRN
Status: DISCONTINUED | OUTPATIENT
Start: 2025-07-20 | End: 2025-07-23 | Stop reason: HOSPADM

## 2025-07-20 RX ORDER — ACETAMINOPHEN 325 MG/1
650 TABLET ORAL EVERY 4 HOURS PRN
Status: DISCONTINUED | OUTPATIENT
Start: 2025-07-20 | End: 2025-07-23 | Stop reason: HOSPADM

## 2025-07-20 RX ORDER — LIDOCAINE 40 MG/G
CREAM TOPICAL
Status: DISCONTINUED | OUTPATIENT
Start: 2025-07-20 | End: 2025-07-20

## 2025-07-20 RX ORDER — BISACODYL 10 MG
10 SUPPOSITORY, RECTAL RECTAL DAILY PRN
Status: DISCONTINUED | OUTPATIENT
Start: 2025-07-20 | End: 2025-07-23 | Stop reason: HOSPADM

## 2025-07-20 RX ORDER — OXYTOCIN 10 [USP'U]/ML
10 INJECTION, SOLUTION INTRAMUSCULAR; INTRAVENOUS
Status: DISCONTINUED | OUTPATIENT
Start: 2025-07-20 | End: 2025-07-23 | Stop reason: HOSPADM

## 2025-07-20 RX ORDER — ENOXAPARIN SODIUM 100 MG/ML
40 INJECTION SUBCUTANEOUS EVERY 12 HOURS SCHEDULED
Status: DISCONTINUED | OUTPATIENT
Start: 2025-07-21 | End: 2025-07-23 | Stop reason: HOSPADM

## 2025-07-20 RX ORDER — POLYETHYLENE GLYCOL 3350 17 G/17G
17 POWDER, FOR SOLUTION ORAL DAILY PRN
Status: DISCONTINUED | OUTPATIENT
Start: 2025-07-20 | End: 2025-07-23 | Stop reason: HOSPADM

## 2025-07-20 RX ORDER — FENTANYL CITRATE-0.9 % NACL/PF 10 MCG/ML
100 PLASTIC BAG, INJECTION (ML) INTRAVENOUS EVERY 5 MIN PRN
Status: DISCONTINUED | OUTPATIENT
Start: 2025-07-20 | End: 2025-07-20 | Stop reason: HOSPADM

## 2025-07-20 RX ORDER — SODIUM CHLORIDE, SODIUM LACTATE, POTASSIUM CHLORIDE, CALCIUM CHLORIDE 600; 310; 30; 20 MG/100ML; MG/100ML; MG/100ML; MG/100ML
INJECTION, SOLUTION INTRAVENOUS CONTINUOUS
Status: DISCONTINUED | OUTPATIENT
Start: 2025-07-20 | End: 2025-07-23 | Stop reason: HOSPADM

## 2025-07-20 RX ORDER — BUPIVACAINE HYDROCHLORIDE 2.5 MG/ML
INJECTION, SOLUTION EPIDURAL; INFILTRATION; INTRACAUDAL; PERINEURAL PRN
Status: DISCONTINUED | OUTPATIENT
Start: 2025-07-20 | End: 2025-07-21

## 2025-07-20 RX ORDER — NALBUPHINE HYDROCHLORIDE 10 MG/ML
2.5-5 INJECTION INTRAMUSCULAR; INTRAVENOUS; SUBCUTANEOUS EVERY 6 HOURS PRN
Status: DISCONTINUED | OUTPATIENT
Start: 2025-07-20 | End: 2025-07-23 | Stop reason: HOSPADM

## 2025-07-20 RX ORDER — LIDOCAINE HYDROCHLORIDE 10 MG/ML
INJECTION, SOLUTION EPIDURAL; INFILTRATION; INTRACAUDAL; PERINEURAL PRN
Status: DISCONTINUED | OUTPATIENT
Start: 2025-07-20 | End: 2025-07-21

## 2025-07-20 RX ORDER — METHYLERGONOVINE MALEATE 0.2 MG/ML
200 INJECTION INTRAVENOUS
Status: DISCONTINUED | OUTPATIENT
Start: 2025-07-20 | End: 2025-07-23 | Stop reason: HOSPADM

## 2025-07-20 RX ORDER — LOPERAMIDE HYDROCHLORIDE 2 MG/1
2 CAPSULE ORAL
Status: DISCONTINUED | OUTPATIENT
Start: 2025-07-20 | End: 2025-07-23 | Stop reason: HOSPADM

## 2025-07-20 RX ORDER — AMOXICILLIN 250 MG
2 CAPSULE ORAL
Status: DISCONTINUED | OUTPATIENT
Start: 2025-07-20 | End: 2025-07-23 | Stop reason: HOSPADM

## 2025-07-20 RX ORDER — HYDRALAZINE HYDROCHLORIDE 20 MG/ML
10 INJECTION INTRAMUSCULAR; INTRAVENOUS
Status: DISCONTINUED | OUTPATIENT
Start: 2025-07-20 | End: 2025-07-23 | Stop reason: HOSPADM

## 2025-07-20 RX ORDER — BUPIVACAINE HYDROCHLORIDE 2.5 MG/ML
10 INJECTION, SOLUTION EPIDURAL; INFILTRATION; INTRACAUDAL; PERINEURAL ONCE
Status: DISCONTINUED | OUTPATIENT
Start: 2025-07-20 | End: 2025-07-20 | Stop reason: HOSPADM

## 2025-07-20 RX ORDER — MISOPROSTOL 100 UG/1
400 TABLET ORAL
Status: DISCONTINUED | OUTPATIENT
Start: 2025-07-20 | End: 2025-07-23 | Stop reason: HOSPADM

## 2025-07-20 RX ORDER — SODIUM CHLORIDE, SODIUM LACTATE, POTASSIUM CHLORIDE, CALCIUM CHLORIDE 600; 310; 30; 20 MG/100ML; MG/100ML; MG/100ML; MG/100ML
10-125 INJECTION, SOLUTION INTRAVENOUS CONTINUOUS
Status: DISCONTINUED | OUTPATIENT
Start: 2025-07-20 | End: 2025-07-23 | Stop reason: HOSPADM

## 2025-07-20 RX ORDER — LIDOCAINE HYDROCHLORIDE AND EPINEPHRINE 15; 5 MG/ML; UG/ML
3 INJECTION, SOLUTION EPIDURAL
Status: COMPLETED | OUTPATIENT
Start: 2025-07-20 | End: 2025-07-20

## 2025-07-20 RX ORDER — OXYCODONE HYDROCHLORIDE 5 MG/1
5 TABLET ORAL EVERY 4 HOURS PRN
Refills: 0 | Status: DISCONTINUED | OUTPATIENT
Start: 2025-07-20 | End: 2025-07-23 | Stop reason: HOSPADM

## 2025-07-20 RX ORDER — ONDANSETRON 4 MG/1
4 TABLET, ORALLY DISINTEGRATING ORAL EVERY 6 HOURS PRN
Status: DISCONTINUED | OUTPATIENT
Start: 2025-07-20 | End: 2025-07-20 | Stop reason: HOSPADM

## 2025-07-20 RX ORDER — OXYTOCIN/0.9 % SODIUM CHLORIDE 30/500 ML
340 PLASTIC BAG, INJECTION (ML) INTRAVENOUS CONTINUOUS PRN
Status: DISCONTINUED | OUTPATIENT
Start: 2025-07-20 | End: 2025-07-23 | Stop reason: HOSPADM

## 2025-07-20 RX ORDER — LIDOCAINE 40 MG/G
CREAM TOPICAL
Status: DISCONTINUED | OUTPATIENT
Start: 2025-07-20 | End: 2025-07-23 | Stop reason: HOSPADM

## 2025-07-20 RX ORDER — LOPERAMIDE HYDROCHLORIDE 2 MG/1
4 CAPSULE ORAL
Status: DISCONTINUED | OUTPATIENT
Start: 2025-07-20 | End: 2025-07-23 | Stop reason: HOSPADM

## 2025-07-20 RX ORDER — SODIUM CHLORIDE, SODIUM LACTATE, POTASSIUM CHLORIDE, CALCIUM CHLORIDE 600; 310; 30; 20 MG/100ML; MG/100ML; MG/100ML; MG/100ML
INJECTION, SOLUTION INTRAVENOUS CONTINUOUS PRN
Status: DISCONTINUED | OUTPATIENT
Start: 2025-07-20 | End: 2025-07-20

## 2025-07-20 RX ORDER — HYDROCORTISONE 25 MG/G
CREAM TOPICAL 3 TIMES DAILY PRN
Status: DISCONTINUED | OUTPATIENT
Start: 2025-07-20 | End: 2025-07-23 | Stop reason: HOSPADM

## 2025-07-20 RX ADMIN — ONDANSETRON HYDROCHLORIDE 4 MG: 2 SOLUTION INTRAMUSCULAR; INTRAVENOUS at 09:38

## 2025-07-20 RX ADMIN — ACETAMINOPHEN 650 MG: 325 TABLET ORAL at 20:25

## 2025-07-20 RX ADMIN — ACETAMINOPHEN 650 MG: 325 TABLET ORAL at 08:29

## 2025-07-20 RX ADMIN — Medication 10 ML/HR: at 12:00

## 2025-07-20 RX ADMIN — MISOPROSTOL 25 MCG: 100 TABLET ORAL at 04:30

## 2025-07-20 RX ADMIN — Medication 10 ML/HR: at 20:27

## 2025-07-20 RX ADMIN — BUPIVACAINE HYDROCHLORIDE 10 ML: 2.5 INJECTION, SOLUTION EPIDURAL; INFILTRATION; INTRACAUDAL; PERINEURAL at 11:56

## 2025-07-20 RX ADMIN — Medication 2 MILLI-UNITS/MIN: at 19:00

## 2025-07-20 RX ADMIN — LIDOCAINE HYDROCHLORIDE AND EPINEPHRINE 3 ML: 15; 5 INJECTION, SOLUTION EPIDURAL at 11:49

## 2025-07-20 RX ADMIN — MISOPROSTOL 25 MCG: 100 TABLET ORAL at 00:25

## 2025-07-20 RX ADMIN — ACETAMINOPHEN 650 MG: 325 TABLET ORAL at 12:33

## 2025-07-20 RX ADMIN — NIFEDIPINE 60 MG: 30 TABLET, FILM COATED, EXTENDED RELEASE ORAL at 20:25

## 2025-07-20 RX ADMIN — LIDOCAINE HYDROCHLORIDE 10 ML: 10 INJECTION, SOLUTION INFILTRATION; PERINEURAL at 22:20

## 2025-07-20 RX ADMIN — FENTANYL CITRATE 50 MCG: 50 INJECTION INTRAMUSCULAR; INTRAVENOUS at 09:34

## 2025-07-20 RX ADMIN — LIDOCAINE HYDROCHLORIDE 5 ML: 10 INJECTION, SOLUTION EPIDURAL; INFILTRATION; INTRACAUDAL; PERINEURAL at 11:46

## 2025-07-20 RX ADMIN — ACETAMINOPHEN 650 MG: 325 TABLET ORAL at 16:36

## 2025-07-20 RX ADMIN — SODIUM CHLORIDE, SODIUM LACTATE, POTASSIUM CHLORIDE, AND CALCIUM CHLORIDE 500 ML: .6; .31; .03; .02 INJECTION, SOLUTION INTRAVENOUS at 11:46

## 2025-07-20 RX ADMIN — ACETAMINOPHEN 650 MG: 325 TABLET ORAL at 04:35

## 2025-07-20 RX ADMIN — Medication 100 MCG: at 12:48

## 2025-07-20 ASSESSMENT — ACTIVITIES OF DAILY LIVING (ADL)
ADLS_ACUITY_SCORE: 15
ADLS_ACUITY_SCORE: 15
ADLS_ACUITY_SCORE: 18
ADLS_ACUITY_SCORE: 18
ADLS_ACUITY_SCORE: 15
ADLS_ACUITY_SCORE: 18
ADLS_ACUITY_SCORE: 18
ADLS_ACUITY_SCORE: 15
ADLS_ACUITY_SCORE: 18
ADLS_ACUITY_SCORE: 15
ADLS_ACUITY_SCORE: 18
ADLS_ACUITY_SCORE: 15

## 2025-07-20 NOTE — PROGRESS NOTES
Patient able to use tub this AM for comfort. Per MD if patient SROMs discontinue tub use with balloon in place.

## 2025-07-20 NOTE — PROGRESS NOTES
CRNA noted of dermatome level being T6, patient denies SOB/numbness/tingling in the hands, patient HOB elevated.

## 2025-07-20 NOTE — ANESTHESIA PREPROCEDURE EVALUATION
Anesthesia Pre-Procedure Evaluation    Patient: Lyn Villaseñor   MRN: [id number] : 2000          Procedure :          Past Medical History:   Diagnosis Date    Multiple food allergies 2023    Wheat (low), scallops (low), shrimp (moderate), cow's milk (moderate), egg white (moderate).    Spondylolysis of lumbar region     No Comments Provided      Past Surgical History:   Procedure Laterality Date    COLONOSCOPY  2024    normal - [facility]    EGD  2024    due to chronic nausea - normal - [facility]    ENDOSCOPIC SINUS SURGERY N/A 10/31/2023    Procedure: Endoscopic Sinus Surgery;  Surgeon: Lalita Hewitt MD;  Location: [facility] OR    Lumbar fusion of L5  3017    subsequent infection    SEPTOPLASTY, TURBINOPLASTY, COMBINED Bilateral 10/31/2023    Procedure: Septoplasty, Turbinate Reduction, Excision Right Vicenta Bullosa;  Surgeon: Lalita Hewitt MD;  Location: [facility] OR      Allergies   Allergen Reactions    Azithromycin Other (See Comments)     Numbness in the right side, neurological changes.     Amoxicillin Nausea and Vomiting     Tolerates cephalexin    Cats     Dogs     Doxycycline Nausea and Vomiting    Prozac [Fluoxetine] Dizziness and Nausea and Vomiting    Rifampin Nausea and Vomiting    Cow's Milk [Milk (Cow)]     Egg White (Egg Protein)     Hydrocodone Nausea     Vomiting    Seasonal Allergies Other (See Comments)     Dust, pollen    Shrimp      Also allergic to scallops    Wheat Extract     Wheat Germ Oil Other (See Comments)     Dust, pollen      Social History     Tobacco Use    Smoking status: Never     Passive exposure: Never    Smokeless tobacco: Never    Tobacco comments:     Quit smoking: Mom smokes outside   Substance Use Topics    Alcohol use: No      Wt Readings from Last 1 Encounters:   07/15/25 114.2 kg (251 lb 11.2 oz)        Anesthesia Evaluation   Pt has had prior anesthetic.     No history of anesthetic complications       ROS/MED HX  ENT/Pulmonary:      (+)                     Mild Persistent, asthma  Treatment: Inhaler prn,                 Neurologic:  - neg neurologic ROS     Cardiovascular:     (+)  hypertension- - PIH  -  - -                                      METS/Exercise Tolerance: >4 METS    Hematologic:  - neg hematologic  ROS     Musculoskeletal: Comment: L5-S1 lumbar fusion       GI/Hepatic:     (+) GERD, Symptomatic,                  Renal/Genitourinary:  - neg Renal ROS     Endo:  - neg endo ROS     Psychiatric/Substance Use:  - neg psychiatric ROS   (+) psychiatric history anxiety and depression       Infectious Disease:  - neg infectious disease ROS     Malignancy:  - neg malignancy ROS     Other:  - neg other ROS            Physical Exam  Airway  Mallampati: I  TM distance: >3 FB  Neck ROM: full  Upper bite lip test: I  Mouth opening: >= 4 cm    Cardiovascular - normal exam  Rhythm: regular  Rate: normal rate     Dental - normal exam  (+) Completely normal teeth      Pulmonary - normal examBreath sounds clear to auscultation        Neurological - normal exam  She appears awake, alert and oriented x3.    Other Findings       OUTSIDE LABS:  CBC:   Lab Results   Component Value Date    WBC 9.0 07/19/2025    WBC 9.7 07/17/2025    HGB 11.2 (L) 07/19/2025    HGB 13.2 07/17/2025    HCT 32.0 (L) 07/19/2025    HCT 37.8 07/17/2025     07/19/2025     07/17/2025     BMP:   Lab Results   Component Value Date     07/19/2025     07/17/2025    POTASSIUM 3.6 07/19/2025    POTASSIUM 3.9 07/17/2025    CHLORIDE 105 07/19/2025    CHLORIDE 101 07/17/2025    CO2 20 (L) 07/19/2025    CO2 20 (L) 07/17/2025    BUN 9.1 07/19/2025    BUN 6.4 07/17/2025    CR 0.49 (L) 07/19/2025    CR 0.45 (L) 07/17/2025    GLC 86 07/19/2025    GLC 77 07/17/2025     COAGS:   Lab Results   Component Value Date    PTT 33 03/29/2018    INR 1.1 07/17/2017     POC:   Lab Results   Component Value Date    HCG Positive (A) 12/05/2024     HEPATIC:   Lab Results   Component  Value Date    ALBUMIN 3.4 (L) 07/19/2025    PROTTOTAL 6.3 (L) 07/19/2025    ALT 14 07/19/2025    AST 27 07/19/2025    ALKPHOS 131 07/19/2025    BILITOTAL <0.2 07/19/2025    BILIDIRECT 0.04 09/03/2017     OTHER:   Lab Results   Component Value Date    LACT 1.4 01/02/2024    A1C 5.3 04/30/2024    RAVI 8.7 (L) 07/19/2025    LIPASE 19 01/02/2024    TSH 0.62 12/07/2023    SED 20 10/02/2017       Anesthesia Plan    ASA Status:  2      NPO Status: NPO Appropriate   Anesthesia Type: Epidural.        Consents    Anesthesia Plan(s) and associated risks, benefits, and realistic alternatives discussed. Questions answered and patient/representative(s) expressed understanding.     - Discussed:     - Discussed with:  Patient               Postoperative Care         Comments:               neg OB ROS.      MILTON Merino CRNA    I have reviewed the pertinent notes and labs in the chart from the past 30 days and (re)examined the patient.  Any updates or changes from those notes are reflected in this note.    Clinically Significant Risk Factors Present on Admission               # Hypoalbuminemia: Lowest albumin = 3.4 g/dL at 7/19/2025  7:46 PM, will monitor as appropriate   # Drug Induced Platelet Defect: home medication list includes an antiplatelet medication   # Hypertension: Noted on problem list               # Asthma: noted on problem list

## 2025-07-20 NOTE — PROGRESS NOTES
Dermatomes at the xyphoid process, CRNA notified. Okay at this time. Per CRNA keep patient MAP >70.

## 2025-07-20 NOTE — PROGRESS NOTES
OB Progress Note    Pt comfortable, s/p miso over night      EXAM  /70   Pulse 70   Temp 98.1  F (36.7  C) (Oral)   Resp 18   LMP 11/01/2024 (Exact Date)   SpO2 98%   Breastfeeding No   Gen - NAD  Abd - gravid, NT  VE - 1.5/60 per last nurse exam    A/P Induction for chronic HTN   1. Induction - has had Miso throughout the night. Balloon now placed. Plan for Pit then AROM this evening.   2. Pt mgmt as needed though pt tolerated baloon placement very well.    JASWINDER MITCHELL MD

## 2025-07-20 NOTE — PROGRESS NOTES
Patient requesting pain medication, options discussed. Patient requesting IV fentanyl at this time.

## 2025-07-20 NOTE — PROGRESS NOTES
Cook balloon placed 0814. Per MD, 60mL placed in the uterine balloon and 40mL placed in the vaginal balloon. Patient tolerated well.

## 2025-07-20 NOTE — PLAN OF CARE
Goal Outcome Evaluation: ongoing, progressing    Plan of Care Reviewed With: patient, spouse    Overall Patient Progress: improving    VSS. Had one mild range blood pressure of 140/80 at 1918, otherwise has been normotensive. Given first dose of cytotec at 2030. SVE: 1/60/-3; patient is not feeling any cramping or contractions yet. Alona monitor in place. Has one IV at this time - will place a second IV if blood pressures increase or patient becomes symptomatic. Currently asymptomatic. +2 dependent edema, +1 generalized edema. Patient would like an epidural eventually. Spouse at bedside and is supportive.    Temp: 98.4  F (36.9  C) Temp src: Oral BP: 123/71 Pulse: 85   Resp: 20 SpO2: 97 % O2 Device: None (Room air)      Vishal Schofield RN

## 2025-07-20 NOTE — ANESTHESIA PROCEDURE NOTES
"Epidural catheter Procedure Note    Pre-Procedure   Staff -        CRNA: Sidney Bella APRN CRNA       Performed By: CRNA       Location: OB       Procedure Start/Stop Times: 7/20/2025 11:40 AM and 7/20/2025 11:55 AM       Pre-Anesthestic Checklist: patient identified, IV checked, risks and benefits discussed, informed consent, monitors and equipment checked, pre-op evaluation, at physician/surgeon's request and post-op pain management  Timeout:       Correct Patient: Yes        Correct Procedure: Yes        Correct Site: Yes        Correct Position: Yes   Procedure Documentation  Procedure: epidural catheter         Diagnosis: Labor Pain       Patient Position: sitting       Patient Prep/Sterile Barriers: sterile gloves, mask, patient draped       Skin prep: Chloraprep       Local skin infiltrated with 5 mL of 1% lidocaine.        Insertion Site: L2-3. (midline approach).       Technique: LORT air        BLAKE at 8 cm.       Needle Type: Touhy needle       Needle Gauge: 17.        Needle Length (Inches): 3.5        Catheter: 19 G.          Catheter threaded easily.         6 cm epidural space.         Threaded 14 cm at skin.         # of attempts: 1 and  # of redirects:  1    Assessment/Narrative         Paresthesias: No.       Test dose of 3 mL lidocaine 1.5% w/ 1:200,000 epinephrine at 11:49 CDT.         Test dose negative, 3 minutes after injection, for signs of intravascular, subdural, or intrathecal injection.       Insertion/Infusion Method: LORT air       Aspiration negative for Heme or CSF via Epidural Catheter.    Medication(s) Administered   Medication Administration Time: 7/20/2025 11:40 AM     Comments:  No complications encountered.  Pt tolerated procedure well.       FOR [facility] ([facility]) ONLY:   Pain Team Contact information: please page the Pain Team Via true[x] Media. Search \"Pain\". During daytime hours, please page the attending first. At night please page the resident first.      "

## 2025-07-20 NOTE — PROGRESS NOTES
Maribel showing contractions. Patient reports not feeling any contractions. Sensitivity changed to low on maribel monitor.    Vishal Schofield RN

## 2025-07-20 NOTE — PROGRESS NOTES
Patient c/o labor pain with balloon. IV fentanyl affective for approximately 25 minutes per the patient. Requesting nitrous, education provided. Patient and SO verbalize understanding.

## 2025-07-20 NOTE — PROGRESS NOTES
"[facility] Labor and Delivery History and Physical    Lyn Villaseñor MRN# [id number]   Age: 24 year old YOB: 2000     Date of Admission:  2025    Primary care provider: Narcisa Steve           Chief Complaint:   Lyn Villaseñor is a 24 year old female who is 37w1d pregnant and being admitted for induction due to chronic hypertension. Pressures not currently in the severe range but pt is on 60mg of Nifedipine qd          Pregnancy history:     OBSTETRIC HISTORY:    OB History    Para Term  AB Living   1 0 0 0 0 0   SAB IAB Ectopic Multiple Live Births   0 0 0 0 0      # Outcome Date GA Lbr Blake/2nd Weight Sex Type Anes PTL Lv   1 Current                EDC: Estimated Date of Delivery: 25    Prenatal Labs:   Lab Results   Component Value Date    AS Negative 2025    HEPBANG Nonreactive 2025    HGB 13.2 2025       GBS Status:   No results found for: \"GBS\"    Active Problem List  Patient Active Problem List   Diagnosis    Acne    Menorrhagia with irregular cycle    Spondylolysis of lumbar region    Mild persistent asthma without complication    Migraine without aura and without status migrainosus, not intractable    Congestion of paranasal sinus    Multiple food allergies    Anxiety and depression    Nausea and vomiting in pregnancy    Encounter for triage in pregnant patient       Medication Prior to Admission  Medications Prior to Admission   Medication Sig Dispense Refill Last Dose/Taking    albuterol (PROAIR HFA/PROVENTIL HFA/VENTOLIN HFA) 108 (90 Base) MCG/ACT inhaler Inhale 2 puffs into the lungs every 4 hours as needed for shortness of breath or wheezing 18 g 11 More than a month    aspirin 81 MG EC tablet Take 1 tablet (81 mg) by mouth daily. 100 tablet 3 2025 at  9:00 PM    cetirizine (ZYRTEC) 10 MG tablet Take 10 mg every evening 60 tablet 11 2025 at  9:00 PM    NIFEdipine ER OSMOTIC (PROCARDIA XL) 30 MG 24 hr tablet " Take 2 tablets (60 mg) by mouth daily. 90 tablet 3 7/18/2025 at  9:00 PM    Prenatal Vit-Fe Fumarate-FA (PRENATAL MULTIVITAMIN  PLUS IRON) 27-1 MG TABS Take 1 tablet by mouth daily.   7/18/2025 at  9:00 PM    sertraline (ZOLOFT) 25 MG tablet Take 1 tablet (25 mg) by mouth daily. 90 tablet 3 7/18/2025 at  9:00 PM    magnesium oxide (MAG-OX) 400 MG tablet Take 1 tablet (400 mg) by mouth at bedtime. 30 tablet 1 Unknown    metoclopramide (REGLAN) 10 MG tablet Take 1 tablet (10 mg) by mouth 3 times daily as needed for other (headaches). 60 tablet 1 Unknown    ondansetron (ZOFRAN ODT) 4 MG ODT tab Take 1 tablet (4 mg) by mouth every 8 hours as needed for nausea. 30 tablet 1 Unknown    promethazine (PHENERGAN) 25 MG tablet Take 1 tablet (25 mg) by mouth every 6 hours as needed for nausea. 30 tablet 1 Unknown    rizatriptan (MAXALT-MLT) 5 MG ODT Take 1 tablet (5 mg) by mouth at onset of headache for migraine May repeat in 2 hours. Max 6 tablets/24 hours. 20 tablet 11 Unknown   .        Maternal Past Medical History:     Past Medical History:   Diagnosis Date    Multiple food allergies 7/1/2023    Wheat (low), scallops (low), shrimp (moderate), cow's milk (moderate), egg white (moderate).    Spondylolysis of lumbar region     No Comments Provided                       Family History:   I have reviewed this patient's family history            Social History:   I have reviewed this patient's social history         Review of Systems:   The Review of Systems is negative other than noted in the HPI          Physical Exam:   LMP 11/01/2024 (Exact Date)   Gen - NAD  Neck - supple  CV - RRR  Resp - normal  Abd - soft, NT  VE - pending (no contractions, presume unchanged from clinic)  Ext - no CT, No edema    NST reassuring  [place] - no ctx's                             Assessment:   Lyn Villaseñor is a 37w1d pregnant female admitted for induction of labor due to chronic HTN.          Plan:   Admit - see IP orders  GBS  negative  Plan to ripen then Pit and AROM when appropriate (expect tomorrow)  Baseline labs sent      Ender Brewer MD

## 2025-07-20 NOTE — PROGRESS NOTES
Lyn Villaseñor  [id number]  37w1d        Lyn Villaseñor presents for induction of labor for chronic hypertension. Patient denies contractions, bleeding or ROM.     Past Medical History:   Diagnosis Date    Multiple food allergies 2023    Wheat (low), scallops (low), shrimp (moderate), cow's milk (moderate), egg white (moderate).    Spondylolysis of lumbar region     No Comments Provided       Dr. Brewer notified of patient's arrival and status.    Plan:  -cervical ripening    Vishal Schofield RN

## 2025-07-20 NOTE — PROGRESS NOTES
CRNA contacted for BP 87/54, patient currently c/o nausea. Patient has gotten one dose of yonatan and additional IV fluids. POC discussed r/t chronic hypertension, FHT and patients symptoms. Per CRNA give 1L bolus.

## 2025-07-21 LAB
CREAT SERPL-MCNC: 0.52 MG/DL (ref 0.51–0.95)
EGFRCR SERPLBLD CKD-EPI 2021: >90 ML/MIN/1.73M2
HGB BLD-MCNC: 11.4 G/DL (ref 11.7–15.7)
MCV RBC AUTO: 88 FL (ref 78–100)
MCV RBC AUTO: 89 FL (ref 78–100)
PLATELET # BLD AUTO: 235 10E3/UL (ref 150–450)

## 2025-07-21 PROCEDURE — 250N000013 HC RX MED GY IP 250 OP 250 PS 637: Performed by: STUDENT IN AN ORGANIZED HEALTH CARE EDUCATION/TRAINING PROGRAM

## 2025-07-21 PROCEDURE — 82565 ASSAY OF CREATININE: CPT | Performed by: OBSTETRICS & GYNECOLOGY

## 2025-07-21 PROCEDURE — 250N000011 HC RX IP 250 OP 636: Performed by: OBSTETRICS & GYNECOLOGY

## 2025-07-21 PROCEDURE — 85018 HEMOGLOBIN: CPT | Performed by: OBSTETRICS & GYNECOLOGY

## 2025-07-21 PROCEDURE — 120N000001 HC R&B MED SURG/OB

## 2025-07-21 PROCEDURE — 85049 AUTOMATED PLATELET COUNT: CPT | Performed by: OBSTETRICS & GYNECOLOGY

## 2025-07-21 PROCEDURE — 722N000001 HC LABOR CARE VAGINAL DELIVERY SINGLE

## 2025-07-21 PROCEDURE — 250N000013 HC RX MED GY IP 250 OP 250 PS 637: Performed by: OBSTETRICS & GYNECOLOGY

## 2025-07-21 PROCEDURE — 36415 COLL VENOUS BLD VENIPUNCTURE: CPT | Performed by: OBSTETRICS & GYNECOLOGY

## 2025-07-21 RX ORDER — SIMETHICONE 80 MG
80 TABLET,CHEWABLE ORAL EVERY 6 HOURS PRN
Status: DISCONTINUED | OUTPATIENT
Start: 2025-07-21 | End: 2025-07-23 | Stop reason: HOSPADM

## 2025-07-21 RX ADMIN — ENOXAPARIN SODIUM 40 MG: 40 INJECTION SUBCUTANEOUS at 21:54

## 2025-07-21 RX ADMIN — ACETAMINOPHEN 650 MG: 325 TABLET ORAL at 03:45

## 2025-07-21 RX ADMIN — IBUPROFEN 800 MG: 400 TABLET ORAL at 19:16

## 2025-07-21 RX ADMIN — IBUPROFEN 800 MG: 400 TABLET ORAL at 07:26

## 2025-07-21 RX ADMIN — ACETAMINOPHEN 650 MG: 325 TABLET ORAL at 21:57

## 2025-07-21 RX ADMIN — IBUPROFEN 800 MG: 400 TABLET ORAL at 00:46

## 2025-07-21 RX ADMIN — IBUPROFEN 800 MG: 400 TABLET ORAL at 14:18

## 2025-07-21 RX ADMIN — ENOXAPARIN SODIUM 40 MG: 40 INJECTION SUBCUTANEOUS at 09:06

## 2025-07-21 RX ADMIN — METHYLCELLULOSE 1000 MG: 500 TABLET ORAL at 09:06

## 2025-07-21 RX ADMIN — NIFEDIPINE 60 MG: 30 TABLET, FILM COATED, EXTENDED RELEASE ORAL at 21:54

## 2025-07-21 RX ADMIN — SIMETHICONE 80 MG: 80 TABLET, CHEWABLE ORAL at 16:20

## 2025-07-21 RX ADMIN — ACETAMINOPHEN 650 MG: 325 TABLET ORAL at 08:03

## 2025-07-21 ASSESSMENT — ACTIVITIES OF DAILY LIVING (ADL)
ADLS_ACUITY_SCORE: 26
ADLS_ACUITY_SCORE: 26
ADLS_ACUITY_SCORE: 23
ADLS_ACUITY_SCORE: 22
ADLS_ACUITY_SCORE: 23
ADLS_ACUITY_SCORE: 26
ADLS_ACUITY_SCORE: 22
ADLS_ACUITY_SCORE: 22
ADLS_ACUITY_SCORE: 23
ADLS_ACUITY_SCORE: 26
ADLS_ACUITY_SCORE: 26
ADLS_ACUITY_SCORE: 18
ADLS_ACUITY_SCORE: 23
ADLS_ACUITY_SCORE: 23
ADLS_ACUITY_SCORE: 18
ADLS_ACUITY_SCORE: 26
ADLS_ACUITY_SCORE: 23
ADLS_ACUITY_SCORE: 23
ADLS_ACUITY_SCORE: 22
ADLS_ACUITY_SCORE: 18
ADLS_ACUITY_SCORE: 26
ADLS_ACUITY_SCORE: 22
ADLS_ACUITY_SCORE: 23

## 2025-07-21 NOTE — PROGRESS NOTES
RN called to notify provider that patient is complete.     Sharda Alejandro RN on 7/20/2025 at 10:44 PM

## 2025-07-21 NOTE — PROGRESS NOTES
" Postpartum Rounding Progress Note    Ms. Villaseñor is PPD #1 today after an uncomplicated  following IOL for cHTN. She reports feeling well with pain across her lower abdomen. Her bleeding has slowed down and she does not endorse any clots. Pain has been well controlled with alternating tylenol and ibuprofen. She has been up and ambulating well, without feeling light-headed or dizzy. Tolerating normal diet, has been able to urinate normally and is passing flatus but has not yet had a bowel movement. No concerns for leg pain or calf pain.  She reports her mood is good. We discussed what to expect as she recovers at home including continued, scant lochia, mood fluctuations and uterine cramping, especially with breastfeeding.       Exam  Vitals:  BP Readings from Last 1 Encounters:   25 101/53     Pulse Readings from Last 1 Encounters:   25 73     Wt Readings from Last 1 Encounters:   07/15/25 114.2 kg (251 lb 11.2 oz)     Ht Readings from Last 1 Encounters:   25 1.651 m (5' 5\")     Estimated body mass index is 41.89 kg/m  as calculated from the following:    Height as of 3/25/25: 1.651 m (5' 5\").    Weight as of 7/15/25: 114.2 kg (251 lb 11.2 oz).  Temp Readings from Last 1 Encounters:   25 97.5  F (36.4  C) (Temporal)       General: No acute distress, well-appearing  CV: Normal rate, no pallor  Lungs: Non-labored respirations  Abdominal: Uterine fundus is firm, midline and just below umbilicus  Pelvic: No labial hematoma  Extremities: Normal movement, mild, symmetric bilateral lower extremity swelling, no sign of erythema or asymmetry. Negative Sally's bilaterally    Assessment & Plan  Ms. Villaseñor is a 24 y.o.  s/p  at 37w2d for IOL-cHTN.    #PPD 1  --Meeting milestones appropriately  --Lochia bleeding w/o clots  --Pain adequately controlled with PRNs  --Tolerating regular diet and ambulating well    # cHTN  -- BP normotensive   -- Nifedipine 60 mg " daily    #IWB  --Infant at bedside, doing well per mom  --Breast feeding    #PNL  --Rh+, Rubella immune, GBS neg    #Disposition  --Continue routine postpartum care  --Anticipate discharge Wednesday for serial BP control    Melissa Prince MD on 7/21/2025 at 8:20 AM

## 2025-07-21 NOTE — PROGRESS NOTES
Dr. Brewer notified at 2150 that patient is complete. 2155 was notified to come attend delivery. Patient made rapid progress and required an ER doctor to come attend delivery. ER doctor, Dr. Zuñiga delivered a viable baby girl at 2209.    Vishal Schofield RN

## 2025-07-21 NOTE — PLAN OF CARE
Goal Outcome Evaluation:      Plan of Care Reviewed With: patient    Overall Patient Progress: improvingOverall Patient Progress: improving         Patient reports mild cramping, moderate bleeding no clots. Fundus is firm at umbilicus. Ambulating independently, voiding spontaneously. BP WNL, good urine output, breastfeeding effectively.

## 2025-07-21 NOTE — ANESTHESIA POSTPROCEDURE EVALUATION
Patient: Lyn Villaseñor    Procedure: * No procedures listed *       Anesthesia Type:  No value filed.    Note:  Disposition: Inpatient   Postop Pain Control: Uneventful            Sign Out: Well controlled pain   PONV: No   Neuro/Psych: Uneventful            Sign Out: Acceptable/Baseline neuro status   Airway/Respiratory: Uneventful            Sign Out: Acceptable/Baseline resp. status   CV/Hemodynamics: Uneventful            Sign Out: Acceptable CV status; No obvious hypovolemia; No obvious fluid overload   Other NRE: NONE   DID A NON-ROUTINE EVENT OCCUR? No    Event details/Postop Comments:  Patient happy with epidural. Up walking, no residual numbness or tingling, voiding without difficulty, denies fevers or chills.         Last vitals:  Vitals:    07/21/25 0351 07/21/25 0720 07/21/25 1150   BP: 111/58 101/53 114/71   Pulse: 78 73 73   Resp: 16 16 16   Temp: 98.2  F (36.8  C) 97.5  F (36.4  C) 96.8  F (36  C)   SpO2: 96% 97% 98%       Electronically Signed By: MILTON Jackson CRNA  July 21, 2025  1:09 PM

## 2025-07-21 NOTE — PLAN OF CARE
Temp: 97.9  F (36.6  C) Temp src: Temporal BP: 117/68 Pulse: 75   Resp: 18 SpO2: 97 % O2 Device: None (Room air)      Assessments as charted. Fundus firm, midline, and two below the umbilicus. Light amount of lochia rubra. Pt utilizing PRN Tylenol and Ibuprofen for pain. Simethicone given x1 for gas pain. Breastfeeding independently. Eating and drinking, voiding spontaneously without difficulty. Attentive to .

## 2025-07-21 NOTE — PROGRESS NOTES
Single viable baby girl born via spontaneous vaginal delivery over 2nd degree tear on 2025 @2488. Delivered by ER doctor, Dr. Zuñiga. Dr. Brewer arrived a few minutes before placenta delivered. Dr. Zuñiga delivered placenta, and Dr. Brewer did 2nd degree lac repair. Spontaneous respirations, with vigorous cry.   Induced Labor at 37 weeks gestation. Apgars 9 and 9  Mom's GBS status negative with antibiotic treatment not indicated 4 hours prior to delivery.    Baby placed on mother's abdomen. ID bands applied to baby, mother, and S.O.     Provider notified by by phone. Reported baby's name and sex, date and time of birth, parents choice of Dr. for baby, gestational age, apgar, mode of delivery, weight in KG, Mother's Group B Strep, and Hepatitus B and HIV status.    Vishal Schofield RN

## 2025-07-21 NOTE — PROGRESS NOTES
Delivery Note  IUP at 38 weeks gestation delivered on 25.     delivery of a viable 7 pounds 2 ounces Female infant.  Apgars of 9 at 1 minute and 9 at 5 minutes.  Labor was spontaneous.  Meds in labor:  Pain Rx nubain and Epidural; Antibiotics No; Other   Perinium: 2nd degree injury  Placenta-mechanism: spontaneous, intact,  with a 3 vessel cord. IV oxytocin was given.  Estimated Blood Loss was 60  Complications of regency, labor and delivery: None  Birth attendants: DO Dedra Gaytan MD

## 2025-07-21 NOTE — PROGRESS NOTES
OB Progress note    Pt delivered 9 minutes after I was called and ER doc covered delivery, see delivery note.  Present for delivery of placenta and 2nd degree lac repair.  Good hemostasis  Mom and baby stable      ml    JASWINDER MITCHELL MD

## 2025-07-21 NOTE — PROGRESS NOTES
"OB Progress Note    Pt doing well, Has epidural      EXAM  /72   Pulse 70   Temp 98.1  F (36.7  C) (Oral)   Resp 16   LMP 11/01/2024 (Exact Date)   SpO2 97%   Breastfeeding No   Gen - NAD  Abd - Gravid NT  VE - 4/80/-2    FHT - reassuring  Ctx\"s - q 3    A/P SIUP being induced for chronic HTN   1. Balloon cath removed   2. Will start Pitocin though pt is satnam   3. AROM - clear    JASWINDER MITCHELL MD          " [facility] INPATIENT  PEDIATRICS PICU H&P NOTE      History Of Present Illness  Damien is a 15 year old male with T1DM, presenting from outside hospital with diabetic ketoacidosis.  Patient recently switched insulin pump ~3 days ago, after which he started experiencing high blood sugar readings (~400s). Patient was seen by school nurse today (3/11) with elevated blood sugar, called patient's father. After speaking with patient's PCP, went to ED at [facility] [place]. At outside hospital found to have hyperglycemia, ketonuria, and anion gap metabolic acidosis. Patient was feeling nauseous and had three episodes of emesis this morning. Endorses polydipsia and polyuria. Last meal was yesterday evening (3/10), drank water this morning. Denies confusion, sleepiness, fatigue, abdominal pain, diarrhea, dyspnea, or HA. Denies recent illness or sick contacts.     Outside Hospital Hx:   Glu 499   Metabolic acidosis: 7.18 / 34.0 / 54.0 /12.7   Anion gap: 23  Ketonuria >160  Given 500mL NS bolus   Took patient's pump off, started insulin drip 0.1 units/kg/hour  Transport gave fluids at 202 mL/hr NS    Past Medical History   First diagnosed with diabetes in winter 2019 by hospitalization at [facility]   Received pump November 2020   Endocrinologist: Dr. Perry     Family History  Hx of DM on maternal side      Social History  In 8th grade; [employer]   Lives at home with father and sister     Allergies  NKDA     Medications  Humalog Insulin     ROS  Review of Systems   Constitutional: Negative.    HENT: Negative for congestion.    Respiratory: Negative.  Negative for cough and shortness of breath.    Gastrointestinal: Positive for nausea and vomiting. Negative for abdominal pain and diarrhea.   Endocrine: Positive for polydipsia and polyuria.   Neurological: Negative for headaches.        PCP  Oralia Dumont MD      Last Recorded Vitals    VITAL SIGNS:     Vital Last Value 24 Hour Range   Temperature 98.8 °F  (37.1 °C) (03/11/21 1320) Temp  Min: 98.8 °F (37.1 °C)  Max: 98.8 °F (37.1 °C)   Pulse (!) 108 (03/11/21 1500) Pulse  Min: 108  Max: 126   Respiratory 17 (03/11/21 1500) Resp  Min: 17  Max: 23   Non-Invasive  Blood Pressure (!) 119/59 (03/11/21 1500) BP  Min: 109/56  Max: 119/59   Pulse Oximetry 99 % (03/11/21 1500) SpO2  Min: 97 %  Max: 99 %     Vital Today Admitted   Weight 55 kg (121 lb 4.1 oz) (03/11/21 1320) Weight: 55 kg (121 lb 4.1 oz) (03/11/21 1320)   Height N/A Height: 5' 5.75\" (167 cm) (03/11/21 1320)   Body Mass Index N/A BMI (Calculated): 19.72 (03/11/21 1320)     INTAKE/OUTPUT:      Intake/Output Summary (Last 24 hours) at 3/11/2021 1552  Last data filed at 3/11/2021 1459  Gross per 24 hour   Intake 277.5 ml   Output 500 ml   Net -222.5 ml         RESPIRATORY SUPPORT:  Oxygen Therapy  O2 Device: None/Room air  [unfilled]    LABORATORY DATA:    Recent Results (from the past 24 hour(s))   GLUCOSE, BEDSIDE - POINT OF CARE    Collection Time: 03/11/21  1:30 PM   Result Value Ref Range    GLUCOSE, BEDSIDE - POINT OF CARE 260 (H) 70 - 99 mg/dL   GLUCOSE, BEDSIDE - POINT OF CARE    Collection Time: 03/11/21  1:57 PM   Result Value Ref Range    GLUCOSE, BEDSIDE - POINT OF CARE 232 (H) 70 - 99 mg/dL   GLUCOSE, BEDSIDE - POINT OF CARE    Collection Time: 03/11/21  2:54 PM   Result Value Ref Range    GLUCOSE, BEDSIDE - POINT OF CARE 164 (H) 70 - 99 mg/dL   Magnesium    Collection Time: 03/11/21  2:56 PM   Result Value Ref Range    Magnesium 1.9 1.7 - 2.4 mg/dL   Phosphorus    Collection Time: 03/11/21  2:56 PM   Result Value Ref Range    Phosphorus 4.1 3.3 - 5.4 mg/dL   Basic Metabolic Panel    Collection Time: 03/11/21  2:56 PM   Result Value Ref Range    Fasting Status      Sodium 136 135 - 145 mmol/L    Potassium 4.5 3.4 - 5.1 mmol/L    Chloride 108 (H) 98 - 107 mmol/L    Carbon Dioxide 13 (L) 21 - 32 mmol/L    Anion Gap 20 10 - 20 mmol/L    Glucose 165 (H) 65 - 99 mg/dL    BUN 15 5 - 18 mg/dL     Creatinine 0.53 0.38 - 1.15 mg/dL    Glomerular Filtration Rate      BUN/ Creatinine Ratio 28 (H) 7 - 25    Calcium 9.7 8.0 - 11.0 mg/dL   BLOOD GAS, VENOUS WITH COOXIMETRY - RESPIRATORY    Collection Time: 03/11/21  2:59 PM   Result Value Ref Range    CONDITION - RESPIRATORY ROOM AIR     CARBOXYHEMOGLOBIN - RESPIRATORY 1.8 1.5 - 15.0 %    HEMOGLOBIN - RESPIRATORY 14.1 13.0 - 17.0 g/dL    METHEMOGLOBIN - RESPIRATORY 1.2 <=1.6 %    SITE - RESPIRATORY Venous Line     TEMPERATURE - RESPIRATORY 37.0 degrees    BASE EXCESS / DEFICIT, VENOUS - RESPIRATORY -12 (L) -2 - 2 mmol/L    HCO3, VENOUS - RESPIRATORY 13.5 (L) 22.0 - 28.0 mmol/L    O2 CONTENT, VENOUS - RESPIRATORY 19 %    PCO2, VENOUS - RESPIRATORY 28 (L) 41 - 54 mm Hg    PH, VENOUS - RESPIRATORY 7.29 (L) 7.35 - 7.45 Units    O2 SATURATION, VENOUS - RESPIRATORY 98 (H) 60 - 80 %    PO2, VENOUS - RESPIRATORY 80 (H) 35 - 42 mm Hg    OXYHEMOGLOBIN, VENOUS - RESPIRATORY 94.9 (H) 60.0 - 80.0 %   POTASSIUM - RESPIRATORY    Collection Time: 03/11/21  2:59 PM   Result Value Ref Range    POTASSIUM - RESPIRATORY 4.6 3.4 - 5.1 mmol/L   SODIUM - RESPIRATORY    Collection Time: 03/11/21  2:59 PM   Result Value Ref Range    SODIUM - RESPIRATORY 134 (L) 135 - 145 mmol/L   CALCIUM, IONIZED - RESPIRATORY    Collection Time: 03/11/21  2:59 PM   Result Value Ref Range    CALCIUM, IONIZED - RESPIRATORY 1.36 (H) 1.15 - 1.29 mmol/L   LACTIC ACID, VENOUS - RESPIRATORY    Collection Time: 03/11/21  2:59 PM   Result Value Ref Range    LACTIC ACID, VENOUS - RESPIRATORY 1.3 <2.0 mmol/L          IMAGING STUDIES:    No results found.    Physical Exam  Physical Exam  HENT:      Head: Normocephalic and atraumatic.      Right Ear: External ear normal.      Left Ear: External ear normal.      Nose: Nose normal.      Mouth/Throat:      Mouth: Mucous membranes are moist.      Pharynx: Oropharynx is clear.   Eyes:      Extraocular Movements: Extraocular movements intact.      Conjunctiva/sclera:  Conjunctivae normal.      Pupils: Pupils are equal, round, and reactive to light.   Neck:      Musculoskeletal: Normal range of motion.   Cardiovascular:      Rate and Rhythm: Normal rate and regular rhythm.   Pulmonary:      Effort: Pulmonary effort is normal.      Breath sounds: Normal breath sounds.   Abdominal:      General: Bowel sounds are normal.      Palpations: Abdomen is soft.   Musculoskeletal: Normal range of motion.   Skin:     General: Skin is warm.   Neurological:      General: No focal deficit present.      Mental Status: He is alert and oriented to person, place, and time.   Psychiatric:         Mood and Affect: Mood normal.          ACTIVE PROBLEMS:    There are no active hospital problems to display for this patient.          ACTIVE MEDICATIONS:  Current Facility-Administered Medications   Medication Dose Route Frequency Provider Last Rate Last Admin   • insulin regular (human) (HumuLIN R) 100 units in sodium chloride 0.9% 100 mL infusion  0.1 Units/kg/hr (Dosing Weight) Intravenous Continuous Garland Finch MD 5.5 mL/hr at 21 1530 0.1 Units/kg/hr at 21 1530   • sodium chloride 0.9% infusion   Intravenous Continuous Garland Finch MD       • sodium chloride 0.9 % 1,000 mL with potassium phosphate 15 mmol, potassium acetate 20 mEq infusion   Intravenous Continuous Garland Finch MD        And   • dextrose 10%-NaCl 0.9% with K Phosphate 15 mmol/L & K Acetate 20 mEq/L   Intravenous Continuous Garland Finch  mL/hr at 21 1530 New Bag at 21 1530       Assessment:  Damien Cross is a 15 year old male with T1DM admitted for diabetic ketoacidosis most likely due to pump malfunction, with resolving symptoms of n/v and improving hyperglycemia and anion gap metabolic acidosis.     Plan:  Neuro  Neurochecks Q1H   CAPD Q12H    Cardiovascular  Telemetry monitoring    Respiratory  Most recent VB.29 / 28 / 80 / 13.5  Corrected anion gap 13   VBG Q4H  Continuous pulse ox      FEN/GI  Ca 9.7  iCal 1.36  Phos 4.1  Lactic acid 1.3   Total Fluids @ 200mL/hr  I/O Q2H  BMP, Mg, and Phos Q8H, unless corrected with next VBG  NPO until corrected, then no concentrated sweet diet     ENDOCRINE  Insulin drip: 0.1 unit/kg/hr = 5.6 mL/hr  Most recent blood glucose: 164  BG Q1H   Endocrine consult recommendations:       - Lantus 19 units        - BS correction: (BS - 100) / 55       - Carb ratio: 1 unit per 12g carbs    ID  Isolation per protocol      VTE SCORE 2, ambulate as tolerated   LINES: The use, function and necessity of all lines and invasive devices was discussed on rounds    PICU UP LEVEL 3  Level 3: Level 1 and 2 activities plus OOB to chair TID or sitting up in bed TID if appropriate chair is not available; ambulate BID if trunk control present       Dispo: Pt to remain in PICU while requiring DKA monitoring    Plan was discussed with family, nursing staff, and PICU Attending. All in agreement with the plan.    Note west Tee, MS4  3/11/2021 3:52 PM     Garland Finch

## 2025-07-21 NOTE — LACTATION NOTE
This note was copied from a baby's chart.  INPATIENT LACTATION CONSULT      Consult with Lyn and baby regarding breastfeeding.  Obvious rooting with a strong latch observed this feeding session.  Rhythmic and aggressive suckling also noted.  Instructed Lyn on correct positioning and technique when latching baby on.  Lyn is independent with latching the baby onto breast.  Minimal assistance required.  Encouraged Lyn on the importance of frequent feedings throughout the day (at least 8-12 feedings in a 24 hour period) and skin to skin contact.  Lyn demonstrated and states she understands all information given. Will follow-up with patient and baby for an outpatient lactation consult.    Mela Atkinson RN, IBCLC  Lactation Consultant  [facility]

## 2025-07-21 NOTE — PROGRESS NOTES
Pt unable to order food from the kitchen due to her low sensitivity allergies listed. Pt asked for cows milk, egg whites, and wheat to be removed from her allergies since she eats all of these things frequently with no reaction. Informed Dr. Ching.

## 2025-07-21 NOTE — PROGRESS NOTES
RN calls down to ER for ER doctor to come attend delivery.     Sharda Alejandro RN on 7/20/2025 at 10:44 PM

## 2025-07-21 NOTE — PROGRESS NOTES
Single viable baby girl born via spontaneous vaginal delivery over 2nd degree tear on 2025 @6722. Delivered by ER doctor, Dr. Zuñiga. Dr. Brewer arrived a few minutes before placenta delivered. Dr. Zuñiga delivered placenta, and Dr. Brewer did 2nd degree lac repair. Spontaneous respirations, with vigorous cry.   Induced Labor at 37 weeks gestation. Apgars 9 and 9  Mom's GBS status Negative with antibiotic treatment not indicated 4 hours prior to delivery.    Baby placed on mother's abdomen. ID bands applied to baby, mother, and S.O.     Provider notified by by phone. Reported baby's name and sex, date and time of birth, parents choice of Dr. for baby, gestational age, apgar, mode of delivery, weight in KG, Mother's Group B Strep, and Hepatitus B and HIV status.    Vishal Schofield RN

## 2025-07-22 PROCEDURE — 250N000013 HC RX MED GY IP 250 OP 250 PS 637: Performed by: OBSTETRICS & GYNECOLOGY

## 2025-07-22 PROCEDURE — 120N000001 HC R&B MED SURG/OB

## 2025-07-22 PROCEDURE — 250N000011 HC RX IP 250 OP 636: Performed by: OBSTETRICS & GYNECOLOGY

## 2025-07-22 RX ADMIN — METHYLCELLULOSE 1000 MG: 500 TABLET ORAL at 10:05

## 2025-07-22 RX ADMIN — ENOXAPARIN SODIUM 40 MG: 40 INJECTION SUBCUTANEOUS at 22:45

## 2025-07-22 RX ADMIN — IBUPROFEN 800 MG: 400 TABLET ORAL at 22:44

## 2025-07-22 RX ADMIN — NIFEDIPINE 60 MG: 30 TABLET, FILM COATED, EXTENDED RELEASE ORAL at 20:43

## 2025-07-22 RX ADMIN — IBUPROFEN 800 MG: 400 TABLET ORAL at 07:49

## 2025-07-22 RX ADMIN — ACETAMINOPHEN 650 MG: 325 TABLET ORAL at 10:09

## 2025-07-22 RX ADMIN — ACETAMINOPHEN 650 MG: 325 TABLET ORAL at 18:22

## 2025-07-22 RX ADMIN — IBUPROFEN 800 MG: 400 TABLET ORAL at 15:40

## 2025-07-22 RX ADMIN — ENOXAPARIN SODIUM 40 MG: 40 INJECTION SUBCUTANEOUS at 10:05

## 2025-07-22 ASSESSMENT — ACTIVITIES OF DAILY LIVING (ADL)
ADLS_ACUITY_SCORE: 24
ADLS_ACUITY_SCORE: 26
ADLS_ACUITY_SCORE: 24
ADLS_ACUITY_SCORE: 26
ADLS_ACUITY_SCORE: 24
ADLS_ACUITY_SCORE: 26
ADLS_ACUITY_SCORE: 26
ADLS_ACUITY_SCORE: 24
ADLS_ACUITY_SCORE: 26
ADLS_ACUITY_SCORE: 26
ADLS_ACUITY_SCORE: 24
ADLS_ACUITY_SCORE: 26
ADLS_ACUITY_SCORE: 24
ADLS_ACUITY_SCORE: 26

## 2025-07-22 NOTE — PROGRESS NOTES
Charted all intake and output from previous shift per patient's self report.    Vishal Schofield RN

## 2025-07-22 NOTE — PROGRESS NOTES
" Postpartum Rounding Progress Note    Ms. Villaseñor continues to feel well. Denies any preE symptoms. She his bonding well with baby and breastfeeding is going well.       Exam  Vitals:  BP Readings from Last 1 Encounters:   25 119/64     Pulse Readings from Last 1 Encounters:   25 92     Wt Readings from Last 1 Encounters:   07/15/25 114.2 kg (251 lb 11.2 oz)     Ht Readings from Last 1 Encounters:   25 1.651 m (5' 5\")     Estimated body mass index is 41.89 kg/m  as calculated from the following:    Height as of 3/25/25: 1.651 m (5' 5\").    Weight as of 7/15/25: 114.2 kg (251 lb 11.2 oz).  Temp Readings from Last 1 Encounters:   25 97.9  F (36.6  C) (Temporal)       General: No acute distress, well-appearing  CV: Normal rate, no pallor  Lungs: Non-labored respirations  Abdominal: Uterine fundus is firm, midline and just below umbilicus  Extremities: Normal movement, mild, symmetric bilateral lower extremity swelling, no sign of erythema or asymmetry. Negative Sally's bilaterally    Assessment & Plan  Ms. Villaseñor is a 24 y.o.  s/p  at 37w2d for IOL-cHTN.     #PPD 2 (delivery 1030pm )  --Meeting milestones appropriately  --Lochia bleeding w/o clots  --Pain adequately controlled with PRNs  --Tolerating regular diet and ambulating well     # cHTN  -- BP normotensive   -- Nifedipine 60 mg daily     #IWB  --Infant at bedside, doing well per mom  --Breast feeding     #PNL  --Rh+, Rubella immune, GBS neg     #Disposition  --Continue routine postpartum care  --Anticipate discharge Wednesday for serial BP control       Melissa Prince MD on 2025 at 8:11 AM    "

## 2025-07-22 NOTE — PLAN OF CARE
Goal Outcome Evaluation: ongoing, progressing    Lyn Villaseñor is doing well after vaginal birth. Fundus is firm with light bleeding and no clots. Patient is breast feeding well. Patient has minimal amounts of pain that is well managed with oral analgesics. Patient is ambulating independently in room. She is voiding, spontaneously and without difficulty. Bowel sounds are active.Patient is Rh compatible with baby. Patient has verbalized understanding of routine cares and warning signs. Positive bonding behaviors noted.     Temp: 97.9  F (36.6  C) Temp src: Temporal BP: 125/78 Pulse: 92   Resp: 20 SpO2: 98 % O2 Device: None (Room air)      Vishal Schofield RN on 7/21/2025 at 11:46 PM

## 2025-07-22 NOTE — PLAN OF CARE
Problem: Adult Inpatient Plan of Care  Goal: Plan of Care Review  Description: The Plan of Care Review/Shift note should be completed every shift.  The Outcome Evaluation is a brief statement about your assessment that the patient is improving, declining, or no change.  This information will be displayed automatically on your shift  note.  Outcome: Progressing  Flowsheets (Taken 2025 1517)  Outcome Evaluation: Patient up ambulating in the room, took a tub bath this afternoon, VSS, BP within normal range, pain controled on oral pain medications as needed, light bleeding, and bonding well with .  Plan of Care Reviewed With: patient  Overall Patient Progress: improving  Goal: Absence of Hospital-Acquired Illness or Injury  Intervention: Prevent Skin Injury  Recent Flowsheet Documentation  Taken 2025 0749 by Nadine Garcia RN  Body Position: position changed independently  Skin Protection: adhesive use limited  Intervention: Prevent and Manage VTE (Venous Thromboembolism) Risk  Recent Flowsheet Documentation  Taken 2025 0749 by Nadine Garcia RN  VTE Prevention/Management: (ambulation in room) --  Intervention: Prevent Infection  Recent Flowsheet Documentation  Taken 2025 1500 by Nadine Garcia RN  Infection Prevention:   single patient room provided   rest/sleep promoted   hand hygiene promoted   equipment surfaces disinfected  Taken 2025 0749 by Nadine Garcia RN  Infection Prevention:   single patient room provided   rest/sleep promoted   hand hygiene promoted   equipment surfaces disinfected  Goal: Optimal Comfort and Wellbeing  Intervention: Monitor Pain and Promote Comfort  Recent Flowsheet Documentation  Taken 2025 0749 by Nadine Garcia RN  Pain Management Interventions: medication (see MAR)  Intervention: Provide Person-Centered Care  Recent Flowsheet Documentation  Taken 2025 0749 by Nadine Garcia RN  Trust Relationship/Rapport:    care explained   choices provided   emotional support provided   empathic listening provided   questions answered   questions encouraged   reassurance provided   thoughts/feelings acknowledged     Problem: Labor  Goal: Stable Fetal Wellbeing  Intervention: Promote and Monitor Fetal Wellbeing  Recent Flowsheet Documentation  Taken 7/22/2025 0749 by Nadine Garcia RN  Body Position: position changed independently  Goal: Absence of Infection Signs and Symptoms  Intervention: Prevent or Manage Infection  Recent Flowsheet Documentation  Taken 7/22/2025 1500 by Nadine Garcia RN  Infection Prevention:   single patient room provided   rest/sleep promoted   hand hygiene promoted   equipment surfaces disinfected  Taken 7/22/2025 0749 by Nadine Garcia RN  Infection Prevention:   single patient room provided   rest/sleep promoted   hand hygiene promoted   equipment surfaces disinfected  Infection Management: aseptic technique maintained     Problem: Hypertensive Disorders in Pregnancy  Goal: Patient-Fetal Stabilization  Intervention: Monitor and Manage Symptom Progression  Recent Flowsheet Documentation  Taken 7/22/2025 0749 by Nadine Garcia RN  Seizure Precautions: clutter-free environment maintained     Problem: Breastfeeding  Goal: Effective Breastfeeding  Intervention: Promote Breast Care and Comfort  Recent Flowsheet Documentation  Taken 7/22/2025 0749 by Nadine Garcia RN  Breast Care: Breastfeeding: lanolin to nipples  Intervention: Promote Effective Breastfeeding  Recent Flowsheet Documentation  Taken 7/22/2025 0749 by Nadine Garcia RN  Parent-Child Attachment Promotion:   caring behavior modeled   cue recognition promoted   face-to-face positioning promoted   interaction encouraged   parent/caregiver presence encouraged   participation in care promoted   positive reinforcement provided   rooming-in promoted   skin-to-skin contact encouraged   strengths emphasized  Intervention:  Support Exclusive Breastfeeding Success  Recent Flowsheet Documentation  Taken 7/22/2025 0749 by Nadine Garcia RN  Supportive Measures:   active listening utilized   self-care encouraged     Problem: Postpartum (Vaginal Delivery)  Goal: Successful Parent Role Transition  Intervention: Support Parent Role Transition  Recent Flowsheet Documentation  Taken 7/22/2025 0749 by Nadine Garcia RN  Supportive Measures:   active listening utilized   self-care encouraged  Parent-Child Attachment Promotion:   caring behavior modeled   cue recognition promoted   face-to-face positioning promoted   interaction encouraged   parent/caregiver presence encouraged   participation in care promoted   positive reinforcement provided   rooming-in promoted   skin-to-skin contact encouraged   strengths emphasized  Goal: Absence of Infection Signs and Symptoms  Intervention: Prevent or Manage Infection  Recent Flowsheet Documentation  Taken 7/22/2025 0749 by Nadine Garcia RN  Infection Management: aseptic technique maintained  Goal: Optimal Pain Control and Function  Intervention: Prevent or Manage Pain  Recent Flowsheet Documentation  Taken 7/22/2025 0749 by Nadine Garcia RN  Pain Management Interventions: medication (see MAR)  Perineal Care:   absorbent brief/pad changed   cold pack/ice pack applied   medicated pads applied   perineal hygiene encouraged   perineal spray bottle/warm water use encouraged   perineum cleansed

## 2025-07-23 VITALS
BODY MASS INDEX: 40.05 KG/M2 | WEIGHT: 240.7 LBS | DIASTOLIC BLOOD PRESSURE: 62 MMHG | HEART RATE: 96 BPM | TEMPERATURE: 97.8 F | SYSTOLIC BLOOD PRESSURE: 113 MMHG | OXYGEN SATURATION: 96 % | RESPIRATION RATE: 18 BRPM

## 2025-07-23 PROBLEM — O10.919 CHRONIC HYPERTENSION AFFECTING PREGNANCY: Status: ACTIVE | Noted: 2025-07-23

## 2025-07-23 LAB
MCV RBC AUTO: 92 FL (ref 78–100)
PLATELET # BLD AUTO: 273 10E3/UL (ref 150–450)

## 2025-07-23 PROCEDURE — 36415 COLL VENOUS BLD VENIPUNCTURE: CPT | Performed by: OBSTETRICS & GYNECOLOGY

## 2025-07-23 PROCEDURE — 250N000011 HC RX IP 250 OP 636: Performed by: OBSTETRICS & GYNECOLOGY

## 2025-07-23 PROCEDURE — 250N000013 HC RX MED GY IP 250 OP 250 PS 637: Performed by: OBSTETRICS & GYNECOLOGY

## 2025-07-23 PROCEDURE — 85049 AUTOMATED PLATELET COUNT: CPT | Performed by: OBSTETRICS & GYNECOLOGY

## 2025-07-23 RX ADMIN — ACETAMINOPHEN 650 MG: 325 TABLET ORAL at 07:37

## 2025-07-23 RX ADMIN — ACETAMINOPHEN 650 MG: 325 TABLET ORAL at 02:09

## 2025-07-23 RX ADMIN — ENOXAPARIN SODIUM 40 MG: 40 INJECTION SUBCUTANEOUS at 10:59

## 2025-07-23 RX ADMIN — METHYLCELLULOSE 1000 MG: 500 TABLET ORAL at 10:59

## 2025-07-23 RX ADMIN — IBUPROFEN 800 MG: 400 TABLET ORAL at 10:59

## 2025-07-23 ASSESSMENT — ACTIVITIES OF DAILY LIVING (ADL)
ADLS_ACUITY_SCORE: 24

## 2025-07-23 NOTE — DISCHARGE INSTRUCTIONS
Warning Signs after Having a Baby    Keep this paper on your fridge or somewhere else where you can see it.    Call your provider if you have any of these symptoms up to 12 weeks after having your baby.    Thoughts of hurting yourself or your baby  Pain in your chest or trouble breathing  Severe headache not helped by pain medicine  Eyesight concerns (blurry vision, seeing spots or flashes of light, other changes to eyesight)  Fainting, shaking or other signs of a seizure    Call 9-1-1 if you feel that it is an emergency.     The symptoms below can happen to anyone after giving birth. They can be very serious. Call your provider if you have any of these warning signs.    My provider s phone number: _______________________    Losing too much blood (hemorrhage)    Call your provider if you soak through a pad in less than an hour or pass blood clots bigger than a golf ball. These may be signs that you are bleeding too much.    Blood clots in the legs or lungs    After you give birth, your body naturally clots its blood to help prevent blood loss. Sometimes this increased clotting can happen in other areas of the body, like the legs or lungs. This can block your blood flow and be very dangerous.     Call your provider if you:  Have a red, swollen spot on the back of your leg that is warm or painful when you touch it.   Are coughing up blood.     Infection    Call your provider if you have any of these symptoms:  Fever of 100.4 F (38 C) or higher.  Pain or redness around your stitches if you had an incision.   Any yellow, white, or green fluid coming from places where you had stitches or surgery.    Mood Problems (postpartum depression)    Many people feel sad or have mood changes after having a baby. But for some people, these mood swings are worse.     Call your provider right away if you feel so anxious or nervous that you can't care for yourself or your baby.    Preeclampsia (high blood pressure)    Even if you  didn't have high blood pressure when you were pregnant, you are at risk for the high blood pressure disease called preeclampsia. This risk can last up to 12 weeks after giving birth.     Call your provider if you have:   Pain on your right side under your rib cage  Sudden swelling in the hands and face    Remember: You know your body. If something doesn't feel right, get medical help.     For informational purposes only. Not to replace the advice of your health care provider. Copyright 2020 [facility]. All rights reserved. Clinically reviewed by Kenisha Mariee, RNC-OB, MSN. [employer] [id number] - Rev 02/23.

## 2025-07-23 NOTE — DISCHARGE SUMMARY
[facility] Discharge Summary   Lyn Villaseñor  MRN# [id number]   Age: 24 year old YOB: 2000      Date of Admission: 2025   Date of Discharge: 25     Admitting Physician: Ender Brewer MD   Discharge Physician: Nadine Huston MD      Admission Diagnoses:   -  at 37w2d   - Chronic hypertension  - Migraine headache  - Obesity, BMI 40     Discharge Diagnosis:   - As above, s/p  at 37w2d     Procedures: , repair of second degree perineal laceration, epidural     Discharge Medications:      Review of your medicines        CONTINUE these medicines which have NOT CHANGED        Dose / Directions   albuterol 108 (90 Base) MCG/ACT inhaler  Commonly known as: PROAIR HFA/PROVENTIL HFA/VENTOLIN HFA  Used for: Mild persistent asthma without complication      Dose: 2 puff  Inhale 2 puffs into the lungs every 4 hours as needed for shortness of breath or wheezing  Quantity: 18 g  Refills: 11     cetirizine 10 MG tablet  Commonly known as: zyrTEC  Used for: Perennial allergic rhinitis      Take 10 mg every evening  Quantity: 60 tablet  Refills: 11     magnesium oxide 400 MG tablet  Commonly known as: MAG-OX  Used for: Other migraine with status migrainosus, intractable      Dose: 400 mg  Take 1 tablet (400 mg) by mouth at bedtime.  Quantity: 30 tablet  Refills: 1     NIFEdipine ER OSMOTIC 30 MG 24 hr tablet  Commonly known as: PROCARDIA XL  Used for: Positive pregnancy test, Primary hypertension      Dose: 60 mg  Take 2 tablets (60 mg) by mouth daily.  Quantity: 90 tablet  Refills: 3     prenatal multivitamin  plus iron 27-1 MG Tabs      Dose: 1 tablet  Take 1 tablet by mouth daily.  Refills: 0     rizatriptan 5 MG ODT  Commonly known as: MAXALT-MLT  Used for: Migraine without aura and without status migrainosus, not intractable      Dose: 5 mg  Take 1 tablet (5 mg) by mouth at onset of headache for migraine May repeat in 2 hours. Max 6 tablets/24 hours.  Quantity: 20  tablet  Refills: 11     sertraline 25 MG tablet  Commonly known as: ZOLOFT  Used for: Anxiety and depression      Dose: 25 mg  Take 1 tablet (25 mg) by mouth daily.  Quantity: 90 tablet  Refills: 3            STOP taking      aspirin 81 MG EC tablet        metoclopramide 10 MG tablet  Commonly known as: REGLAN        ondansetron 4 MG ODT tab  Commonly known as: ZOFRAN ODT        promethazine 25 MG tablet  Commonly known as: PHENERGAN                   Consultations: None     Brief History of Labor:   Lyn Villaseñor was admitted at 37w1d for a scheduled induction of labor for chronic hypertension requiring escalating doses of antihypertensive medication.  She received cytotec over the night and a cook catheter was placed in the morning. She received an epidural for pain control. After the balloon was removed, she was 4 cm dilated, pitocin was started and AROM was performed. She progressed to complete dilation and after a short course of pushing, she delivered a viable baby girl.  She had a second degree perineal laceration which was repaired in the usual fashion.      Hospital Course:   The patient's hospital course was unremarkable. Her blood pressure remained well controlled on nifedipine 60 mg daily. On discharge, her pain was well controlled. Vaginal bleeding is less than peak menstrual flow. Voiding without difficulty. Ambulating well and tolerating a normal diet. No fever. Breastfeeding well. Infant is stable.She was discharged on post-partum day #3     Post-partum hemoglobin: 11.4      Discharge Instructions and Follow-Up:   Discharge diet: Regular diet  Discharge activity: Activity as tolerated, nothing in vagina for 6 weeks  Discharge follow-up: 2 and 6 week PP visit, 3-5 day BP check  Wound care: TID sitz baths  She will continue to check her blood pressure twice a day at home. She will call if it is > 140/90 or < 120/80.     Less than 30 minutes were spent on patient's discharge, over 50% of which  was spent counseling.     Nadine Huston MD

## 2025-07-23 NOTE — PROGRESS NOTES
Patient discharged at 1155 AM via ambulation accompanied by spouse and staff. Prescriptions - None ordered for discharge. All belongings sent with patient.     Discharge instructions reviewed with patient and spouse.     Saline lock removed on previous shift, provided discharge instructions, reviewed highlights of handbook (patient has a copy for home), aware of upcoming appointments (including lactation) and need for bilirubin to be redrawn on baby at upcoming lactation appointment, and educated on supplementing formula after breast feeding sessions to assist in ensuring baby is eating enough.     Patient discharged to home.       [facility] [place]  Follow up appointment made:  Yes  Home and hospital aquired medications returned to patient: N/A  Patient reports pain was well managed at discharge: Yes    No further questions or concerns at time of instructions and discharge.

## 2025-07-23 NOTE — PLAN OF CARE
Lyn is doing well postpartum. Vitals WNL. Denies headache or changes in vision. Pain managed with oral analgesics. Scant bleeding, no clots. Fundus two below umbilicus, firm and midline. Positive bonding noted with . Dad at beside attentive to mom and baby. Breastfeeding with minimal assistance every two-three hours. Voiding spontaneously without difficulty. Ambulating independently in room. All questions answered and addressed.     /69 (BP Location: Right arm, Patient Position: Semi-Bass's, Cuff Size: Adult Large)   Pulse 96   Temp 97.4  F (36.3  C) (Temporal)   Resp 18   Wt 109 kg (240 lb 3.2 oz)   LMP 2024 (Exact Date)   SpO2 96%   Breastfeeding Unknown   BMI 39.97 kg/m        Sharda Sierra RN on 2025 at 4:24 AM

## 2025-07-23 NOTE — PROGRESS NOTES
" Postpartum Rounding Progress Note    Ms. Villaseñor continues to feel well. Denies any preE symptoms. She his bonding well with baby and breastfeeding is going well.       Exam  Vitals:  BP Readings from Last 1 Encounters:   25 113/62     Pulse Readings from Last 1 Encounters:   25 96     Wt Readings from Last 1 Encounters:   25 109 kg (240 lb 3.2 oz)     Ht Readings from Last 1 Encounters:   25 1.651 m (5' 5\")     Estimated body mass index is 39.97 kg/m  as calculated from the following:    Height as of 3/25/25: 1.651 m (5' 5\").    Weight as of this encounter: 109 kg (240 lb 3.2 oz).  Temp Readings from Last 1 Encounters:   25 97.8  F (36.6  C) (Temporal)     General: No acute distress, well-appearing  CV: Normal rate, no pallor  Lungs: Non-labored respirations  Abdominal: Uterine fundus is firm, midline and below umbilicus  Extremities: Normal movement, mild, symmetric bilateral lower extremity swelling, no sign of erythema or asymmetry.    Assessment & Plan  Ms. Villaseñor is a 24 y.o.  s/p  at 37w2d for IOL-cHTN.     #PPD 3 (delivery 1030pm )  --Meeting milestones appropriately  --Lochia is light  --Pain adequately controlled with PRNs  --Tolerating regular diet and ambulating well     # cHTN  -- BP normotensive   -- Nifedipine 60 mg daily     #IWB  --Infant at bedside, doing well per mom  --Breast feeding     #PNL  --Rh+, Rubella immune, GBS neg     #Disposition  --Continue routine postpartum care  --Anticipate discharge home today    Nadine Huston MD     "

## 2025-07-23 NOTE — PROGRESS NOTES
CHIEF COMPLAINT:  Routine OB visit at 36w4d    HPI: Lyn Villaseñor presents for a routine OB visit. Patient had BPP earlier today.     - Had nifedipine increased to 60 mg last Tuesday  - Has continue to check her BP 5-10 times a day  - Denies HA, changes in vision, chest pain, SOB    + FM. Denies vb, lof. + cramping    O:   Vitals:    07/15/25 0928   BP: 134/86   BP Location: Right arm   Patient Position: Sitting   Cuff Size: Adult Large   Pulse: 116   Weight: 114.2 kg (251 lb 11.2 oz)    See OB flow sheet  EXAM:   General: Alert, cooperative, clear coherent speech  Respiratory: Unlabored, no wheezing  Lower extremities: +1 bilateral upper (hands) and lower( feet) extremity edema, normal perfusion.   Cvx: 1/50/-2    FHT: WNL on BPP  EFW: 7.25 lb  BPP: 8/8    Results for orders placed or performed during the hospital encounter of 07/15/25   US OB Biophys Prf wo NST wo Measure     Status: None    Narrative    History: Supervision of high risk pregnancy in second trimester    Fetal Movement:  Score 2: At least 3 discrete body/limb movements in 30 minutes  Score 0: Up to 2 episodes of limb/body movements in 30 minutes                    FM = 2    Fetal Breathing movements:  Score 2: At least one episode, at least 30 seconds duration in 30  minutes of observation.  Score 0: Absent or no episodes of greater than 30 seconds    duration in 30 minutes observation.                    FBM = 2    Fetal Tone:  Score 2: At least one episode of active extension with return to     flexion of fetal limbs or trunk, opening and closing of     hands considered normal tone.  Score 0: Absent or no episodes in 30 minutes of observation.                    FT = 2    Amniotic Fluid Volume:  Score 2: At least one pocket of amniotic fluid measuring at least    2 cm in two perpendicular planes.  Score 0: Either no amniotic fluid or a pocket less than 2 cm in    two perpendicular planes.                    AF = 2                         TOTAL = 8/8    MVP: 4.6 cm    HRT Rate: 150 bpm    Placenta Location: Posterior; grade 3    Fetal position: Cephalic      Impression    IMPRESSION: Biophysical profile score 8/8    KIARA POLLOCK MD         SYSTEM ID:  [id number]   Results for orders placed or performed in visit on 07/15/25   Protein  random urine     Status: None   Result Value Ref Range    Total Protein Urine mg/dL 27.4   mg/dL    Total Protein Urine mg/mg Creat 0.18 0.00 - 0.20 mg/mg Cr    Creatinine Urine mg/dL 150.5 mg/dL   CBC with platelets     Status: Normal   Result Value Ref Range    WBC Count 10.4 4.0 - 11.0 10e3/uL    RBC Count 4.19 3.80 - 5.20 10e6/uL    Hemoglobin 12.8 11.7 - 15.7 g/dL    Hematocrit 37.3 35.0 - 47.0 %    MCV 89 78 - 100 fL    MCH 30.5 26.5 - 33.0 pg    MCHC 34.3 31.5 - 36.5 g/dL    RDW 13.7 10.0 - 15.0 %    Platelet Count 295 150 - 450 10e3/uL   AST     Status: Normal   Result Value Ref Range    AST 24 0 - 45 U/L   ALT     Status: Normal   Result Value Ref Range    ALT 19 0 - 50 U/L   Creatinine     Status: Abnormal   Result Value Ref Range    Creatinine 0.47 (L) 0.51 - 0.95 mg/dL    GFR Estimate >90 >60 mL/min/1.73m2   Strep, Group B by PCR     Status: Normal    Specimen: Rectovaginal; Swab   Result Value Ref Range    Group B Strep PCR Negative Negative    Narrative    The [employer] Xpert GBS LB Assay, performed on the [employer], is a qualitative in vitro diagnostic test designed to detect Group B Streptococcus (GBS) DNA from enriched vaginal/rectal swab specimens, using fully automated, real-time polymerase chain reaction (PCR) with fluorogenic detection of the amplified DNA. Xpert GBS LB Assay testing is indicated as an aid in determining GBS colonization status in antepartum women. This assay does not diagnose or monitor treatment for GBS infections. The [employer] Xpert GBS LB Assay is intended for use in hospital, reference or state laboratory settings. The device is not intended for  point-of-care use.       ASSESSMENT/PLAN:     Lyn Villaseñor is a 24 year old  at 36w4d by 32w4d by LMP c/w 9w1d US, here for return OB visit.    # Migraines, more frequent  - zofran, benadryl and a nap  - magnesium at bedtime     # cHTN, lisinopril exposure in early pregnancy, obesity  - On nifedipine 60 mg daily, ASA 81 mg, baseline Pre E labs WNL, will recheck today  - Continue to check BP twice daily at home. Discuss return precautions and parameters when to notify us.  - Had level II US, normal anatomy  - Weekly BPP's,  today  - Delivery timing pending BP control at end of pregnancy  - Recommended IOL on 25 PM.  Will arrive at 1730 for cytotec IOL  - Body mass index is 41.89 kg/m .  Has gained 38 pounds so far this pregnancy     Prenatal WNL, Rh +, Rubella immune, , 3rd tri Hgb 11.6, GBS collected today  Genetics: low risk NIPS and Carrier   Imagin week dating, normal anatomy, posterior placenta grade 3  Immunizations: TDAP   Delivery Plan: Pain control per request, breast feeding. Lactation referral placed.  BC after delivery: Thinking IUD vs condoms  Baby provider: Dr. Pop Jeffers    Has IOL scheduled for next week    Nadine Huston MD

## 2025-07-24 ASSESSMENT — EDINBURGH POSTNATAL DEPRESSION SCALE (EPDS)
I HAVE FELT SAD OR MISERABLE: NOT VERY OFTEN
I HAVE LOOKED FORWARD WITH ENJOYMENT TO THINGS: AS MUCH AS I EVER DID
I HAVE BLAMED MYSELF UNNECESSARILY WHEN THINGS WENT WRONG: NOT VERY OFTEN
I HAVE FELT SCARED OR PANICKY FOR NO GOOD REASON: NO, NOT MUCH
THE THOUGHT OF HARMING MYSELF HAS OCCURRED TO ME: NEVER
I HAVE BEEN SO UNHAPPY THAT I HAVE BEEN CRYING: ONLY OCCASIONALLY
I HAVE BEEN SO UNHAPPY THAT I HAVE HAD DIFFICULTY SLEEPING: NOT VERY OFTEN
THINGS HAVE BEEN GETTING ON TOP OF ME: NO, MOST OF THE TIME I HAVE COPED QUITE WELL
TOTAL SCORE: 7
I HAVE BEEN ANXIOUS OR WORRIED FOR NO GOOD REASON: HARDLY EVER
I HAVE BEEN ABLE TO LAUGH AND SEE THE FUNNY SIDE OF THINGS: AS MUCH AS I ALWAYS COULD

## 2025-07-27 NOTE — H&P
"Hospital Labor and Delivery History and Physical     Lyn Villaseñor MRN# [id number]   Age: 24 year old YOB: 2000      Date of Admission:                  2025     Primary care provider: Narcisa Steve             Chief Complaint:   Lyn Villaseñor is a 24 year old female who is 37w1d pregnant and being admitted for induction due to chronic hypertension. Pressures not currently in the severe range but pt is on 60mg of Nifedipine qd          Pregnancy history:      OBSTETRIC HISTORY:                      OB History    Para Term  AB Living    1 0 0 0 0 0    SAB IAB Ectopic Multiple Live Births       0 0 0 0 0           # Outcome Date GA Lbr Blake/2nd Weight Sex Type Anes PTL Lv   1 Current                           EDC: Estimated Date of Delivery: 25     Prenatal Labs:         Lab Results   Component Value Date     AS Negative 2025     HEPBANG Nonreactive 2025     HGB 13.2 2025         GBS Status:   No results found for: \"GBS\"     Active Problem List      Patient Active Problem List   Diagnosis    Acne    Menorrhagia with irregular cycle    Spondylolysis of lumbar region    Mild persistent asthma without complication    Migraine without aura and without status migrainosus, not intractable    Congestion of paranasal sinus    Multiple food allergies    Anxiety and depression    Nausea and vomiting in pregnancy    Encounter for triage in pregnant patient         Medication Prior to Admission  Prescriptions Prior to Admission           Medications Prior to Admission   Medication Sig Dispense Refill Last Dose/Taking    albuterol (PROAIR HFA/PROVENTIL HFA/VENTOLIN HFA) 108 (90 Base) MCG/ACT inhaler Inhale 2 puffs into the lungs every 4 hours as needed for shortness of breath or wheezing 18 g 11 More than a month    aspirin 81 MG EC tablet Take 1 tablet (81 mg) by mouth daily. 100 tablet 3 2025 at  9:00 PM    cetirizine (ZYRTEC) 10 MG " tablet Take 10 mg every evening 60 tablet 11 7/18/2025 at  9:00 PM    NIFEdipine ER OSMOTIC (PROCARDIA XL) 30 MG 24 hr tablet Take 2 tablets (60 mg) by mouth daily. 90 tablet 3 7/18/2025 at  9:00 PM    Prenatal Vit-Fe Fumarate-FA (PRENATAL MULTIVITAMIN  PLUS IRON) 27-1 MG TABS Take 1 tablet by mouth daily.     7/18/2025 at  9:00 PM    sertraline (ZOLOFT) 25 MG tablet Take 1 tablet (25 mg) by mouth daily. 90 tablet 3 7/18/2025 at  9:00 PM    magnesium oxide (MAG-OX) 400 MG tablet Take 1 tablet (400 mg) by mouth at bedtime. 30 tablet 1 Unknown    metoclopramide (REGLAN) 10 MG tablet Take 1 tablet (10 mg) by mouth 3 times daily as needed for other (headaches). 60 tablet 1 Unknown    ondansetron (ZOFRAN ODT) 4 MG ODT tab Take 1 tablet (4 mg) by mouth every 8 hours as needed for nausea. 30 tablet 1 Unknown    promethazine (PHENERGAN) 25 MG tablet Take 1 tablet (25 mg) by mouth every 6 hours as needed for nausea. 30 tablet 1 Unknown    rizatriptan (MAXALT-MLT) 5 MG ODT Take 1 tablet (5 mg) by mouth at onset of headache for migraine May repeat in 2 hours. Max 6 tablets/24 hours. 20 tablet 11 Unknown      .        Maternal Past Medical History:      Past Medical History        Past Medical History:   Diagnosis Date    Multiple food allergies 7/1/2023     Wheat (low), scallops (low), shrimp (moderate), cow's milk (moderate), egg white (moderate).    Spondylolysis of lumbar region       No Comments Provided                           Family History:   I have reviewed this patient's family history             Social History:   I have reviewed this patient's social history          Review of Systems:   The Review of Systems is negative other than noted in the HPI           Physical Exam:   LMP 11/01/2024 (Exact Date)   Gen - NAD  Neck - supple  CV - RRR  Resp - normal  Abd - soft, NT  VE - pending (no contractions, presume unchanged from clinic)  Ext - no CT, No edema     NST reassuring  [place] - no ctx's                                Assessment:   Lyn Villaseñor is a 37w1d pregnant female admitted for induction of labor due to chronic HTN.            Plan:   Admit - see IP orders  GBS negative  Plan to ripen then Pit and AROM when appropriate (expect tomorrow)  Baseline labs sent        Ender Brewer MD

## 2025-07-30 ASSESSMENT — PATIENT HEALTH QUESTIONNAIRE - PHQ9
SUM OF ALL RESPONSES TO PHQ QUESTIONS 1-9: 2
10. IF YOU CHECKED OFF ANY PROBLEMS, HOW DIFFICULT HAVE THESE PROBLEMS MADE IT FOR YOU TO DO YOUR WORK, TAKE CARE OF THINGS AT HOME, OR GET ALONG WITH OTHER PEOPLE: SOMEWHAT DIFFICULT
SUM OF ALL RESPONSES TO PHQ QUESTIONS 1-9: 2

## 2025-07-31 ENCOUNTER — PRENATAL OFFICE VISIT (OUTPATIENT)
Dept: OBGYN | Facility: OTHER | Age: 25
End: 2025-07-31
Payer: COMMERCIAL

## 2025-07-31 VITALS
HEART RATE: 107 BPM | BODY MASS INDEX: 39.36 KG/M2 | DIASTOLIC BLOOD PRESSURE: 80 MMHG | WEIGHT: 236.5 LBS | SYSTOLIC BLOOD PRESSURE: 126 MMHG | RESPIRATION RATE: 18 BRPM | OXYGEN SATURATION: 98 %

## 2025-07-31 DIAGNOSIS — R30.0 DYSURIA: ICD-10-CM

## 2025-07-31 LAB
ALBUMIN UR-MCNC: 20 MG/DL
APPEARANCE UR: ABNORMAL
BACTERIAL VAGINOSIS VAG-IMP: NEGATIVE
BILIRUB UR QL STRIP: NEGATIVE
CANDIDA DNA VAG QL NAA+PROBE: NOT DETECTED
CANDIDA GLABRATA / CANDIDA KRUSEI DNA: NOT DETECTED
COLOR UR AUTO: YELLOW
GLUCOSE UR STRIP-MCNC: NEGATIVE MG/DL
HGB UR QL STRIP: NEGATIVE
KETONES UR STRIP-MCNC: NEGATIVE MG/DL
LEUKOCYTE ESTERASE UR QL STRIP: ABNORMAL
MUCOUS THREADS #/AREA URNS LPF: PRESENT /LPF
NITRATE UR QL: NEGATIVE
PH UR STRIP: 6.5 [PH] (ref 5–9)
RBC URINE: 4 /HPF
SP GR UR STRIP: 1.03 (ref 1–1.03)
SQUAMOUS EPITHELIAL: 4 /HPF
T VAGINALIS DNA VAG QL NAA+PROBE: NOT DETECTED
UROBILINOGEN UR STRIP-MCNC: NORMAL MG/DL
WBC URINE: 67 /HPF

## 2025-07-31 PROCEDURE — 81001 URINALYSIS AUTO W/SCOPE: CPT | Mod: ZL

## 2025-07-31 PROCEDURE — 81515 NFCT DS BV&VAGINITIS DNA ALG: CPT | Mod: ZL

## 2025-07-31 ASSESSMENT — PAIN SCALES - GENERAL: PAINLEVEL_OUTOF10: NO PAIN (0)

## 2025-07-31 NOTE — NURSING NOTE
Chief Complaint   Patient presents with    Postpartum Care     Has some concerns with possibe UTI or yeast. Having increased burning and itching     Medication Reconciliation: complete    Ronel Conner, LPN

## 2025-07-31 NOTE — PROGRESS NOTES
Follow-Up Visit    S: Ms. Lyn Villaseñor is a 24 year old  here for 2 week postpartum follow up. She was noted to have a  on 25. She reports that she is feeling well. She denies mood concerns. She is breast feeding and this is going okay as she is pumping feeding. . She reports bleeding is Mild . She denies bowel concerns today. Notes new bladder concerns of burning as well as possible yeast vaginitis.       O:  /80   Pulse 107   Resp 18   Wt 107.3 kg (236 lb 8 oz)   LMP 2024 (Exact Date)   SpO2 98%   Breastfeeding Yes   BMI 39.36 kg/m    Gen: Well-appearing, NAD  Pulm: nonlabored  Pelvic:  Normal appearing external female genitalia. Normal hair distribution. Vagina is without lesions. small discharge. Cervix normal, no lesions, no cervical motion tenderness. MVP collected   .  A/P:  Ms. Lyn Villaseñor is a 24 year old  here for 2 week check postpartum. She is doing well. Strict return precautions given. Infection testing completed. Will notify.   - Follow up in 4 weeks for PP check    Ne ROSE, [id number]

## 2025-08-02 LAB — BACTERIA UR CULT: NO GROWTH

## 2025-08-06 ENCOUNTER — OFFICE VISIT (OUTPATIENT)
Dept: OBGYN | Facility: OTHER | Age: 25
End: 2025-08-06
Attending: STUDENT IN AN ORGANIZED HEALTH CARE EDUCATION/TRAINING PROGRAM
Payer: COMMERCIAL

## 2025-08-06 ENCOUNTER — MYC MEDICAL ADVICE (OUTPATIENT)
Dept: OBGYN | Facility: OTHER | Age: 25
End: 2025-08-06
Payer: COMMERCIAL

## 2025-08-06 VITALS
SYSTOLIC BLOOD PRESSURE: 124 MMHG | WEIGHT: 237 LBS | BODY MASS INDEX: 39.44 KG/M2 | DIASTOLIC BLOOD PRESSURE: 74 MMHG | HEART RATE: 78 BPM

## 2025-08-06 DIAGNOSIS — N89.8 VAGINAL DISCHARGE: ICD-10-CM

## 2025-08-06 DIAGNOSIS — R30.0 DYSURIA: Primary | ICD-10-CM

## 2025-08-06 LAB
ALBUMIN UR-MCNC: NEGATIVE MG/DL
APPEARANCE UR: CLEAR
BACTERIAL VAGINOSIS VAG-IMP: NEGATIVE
BILIRUB UR QL STRIP: NEGATIVE
CANDIDA DNA VAG QL NAA+PROBE: NOT DETECTED
CANDIDA GLABRATA / CANDIDA KRUSEI DNA: NOT DETECTED
COLOR UR AUTO: ABNORMAL
GLUCOSE UR STRIP-MCNC: NEGATIVE MG/DL
HGB UR QL STRIP: ABNORMAL
KETONES UR STRIP-MCNC: NEGATIVE MG/DL
LEUKOCYTE ESTERASE UR QL STRIP: ABNORMAL
MUCOUS THREADS #/AREA URNS LPF: PRESENT /LPF
NITRATE UR QL: NEGATIVE
PH UR STRIP: 5.5 [PH] (ref 5–9)
RBC URINE: 8 /HPF
SP GR UR STRIP: 1.01 (ref 1–1.03)
T VAGINALIS DNA VAG QL NAA+PROBE: NOT DETECTED
UROBILINOGEN UR STRIP-MCNC: NORMAL MG/DL
WBC URINE: 2 /HPF

## 2025-08-06 PROCEDURE — 81515 NFCT DS BV&VAGINITIS DNA ALG: CPT | Mod: ZL | Performed by: STUDENT IN AN ORGANIZED HEALTH CARE EDUCATION/TRAINING PROGRAM

## 2025-08-06 PROCEDURE — 81001 URINALYSIS AUTO W/SCOPE: CPT | Mod: ZL | Performed by: STUDENT IN AN ORGANIZED HEALTH CARE EDUCATION/TRAINING PROGRAM

## 2025-08-06 RX ORDER — CLINDAMYCIN HYDROCHLORIDE 300 MG/1
600 CAPSULE ORAL 4 TIMES DAILY
Qty: 56 CAPSULE | Refills: 0 | Status: SHIPPED | OUTPATIENT
Start: 2025-08-06 | End: 2025-08-13

## 2025-08-06 RX ORDER — ONDANSETRON 4 MG/1
4 TABLET, ORALLY DISINTEGRATING ORAL EVERY 8 HOURS PRN
Qty: 15 TABLET | Refills: 0 | Status: SHIPPED | OUTPATIENT
Start: 2025-08-06

## 2025-08-06 ASSESSMENT — PATIENT HEALTH QUESTIONNAIRE - PHQ9
SUM OF ALL RESPONSES TO PHQ QUESTIONS 1-9: 4
SUM OF ALL RESPONSES TO PHQ QUESTIONS 1-9: 4
10. IF YOU CHECKED OFF ANY PROBLEMS, HOW DIFFICULT HAVE THESE PROBLEMS MADE IT FOR YOU TO DO YOUR WORK, TAKE CARE OF THINGS AT HOME, OR GET ALONG WITH OTHER PEOPLE: NOT DIFFICULT AT ALL

## 2025-08-06 ASSESSMENT — PAIN SCALES - GENERAL: PAINLEVEL_OUTOF10: MODERATE PAIN (5)

## 2025-08-09 ENCOUNTER — HEALTH MAINTENANCE LETTER (OUTPATIENT)
Age: 25
End: 2025-08-09

## 2025-08-11 ASSESSMENT — PATIENT HEALTH QUESTIONNAIRE - PHQ9
SUM OF ALL RESPONSES TO PHQ QUESTIONS 1-9: 3
SUM OF ALL RESPONSES TO PHQ QUESTIONS 1-9: 3
10. IF YOU CHECKED OFF ANY PROBLEMS, HOW DIFFICULT HAVE THESE PROBLEMS MADE IT FOR YOU TO DO YOUR WORK, TAKE CARE OF THINGS AT HOME, OR GET ALONG WITH OTHER PEOPLE: SOMEWHAT DIFFICULT

## 2025-08-12 ENCOUNTER — MYC MEDICAL ADVICE (OUTPATIENT)
Dept: OBGYN | Facility: OTHER | Age: 25
End: 2025-08-12

## 2025-08-12 ENCOUNTER — OFFICE VISIT (OUTPATIENT)
Dept: OBGYN | Facility: OTHER | Age: 25
End: 2025-08-12
Attending: NURSE PRACTITIONER
Payer: COMMERCIAL

## 2025-08-12 VITALS
WEIGHT: 239 LBS | DIASTOLIC BLOOD PRESSURE: 78 MMHG | HEART RATE: 92 BPM | HEIGHT: 65 IN | SYSTOLIC BLOOD PRESSURE: 122 MMHG | BODY MASS INDEX: 39.82 KG/M2

## 2025-08-12 DIAGNOSIS — F32.A ANXIETY AND DEPRESSION: ICD-10-CM

## 2025-08-12 DIAGNOSIS — O10.919 HTN IN PREGNANCY, CHRONIC: Primary | ICD-10-CM

## 2025-08-12 DIAGNOSIS — O10.919 HTN IN PREGNANCY, CHRONIC: ICD-10-CM

## 2025-08-12 DIAGNOSIS — F41.9 ANXIETY AND DEPRESSION: ICD-10-CM

## 2025-08-12 LAB
ALBUMIN MFR UR ELPH: 20.3 MG/DL
ALBUMIN SERPL BCG-MCNC: 4.2 G/DL (ref 3.5–5.2)
ALP SERPL-CCNC: 137 U/L (ref 40–150)
ALT SERPL W P-5'-P-CCNC: 58 U/L (ref 0–50)
ANION GAP SERPL CALCULATED.3IONS-SCNC: 15 MMOL/L (ref 7–15)
AST SERPL W P-5'-P-CCNC: 37 U/L (ref 0–45)
BASOPHILS # BLD AUTO: 0.04 10E3/UL (ref 0–0.2)
BASOPHILS NFR BLD AUTO: 0.6 %
BILIRUB SERPL-MCNC: 0.3 MG/DL
BUN SERPL-MCNC: 11.9 MG/DL (ref 6–20)
CALCIUM SERPL-MCNC: 9.5 MG/DL (ref 8.8–10.4)
CHLORIDE SERPL-SCNC: 102 MMOL/L (ref 98–107)
CREAT SERPL-MCNC: 0.65 MG/DL (ref 0.51–0.95)
CREAT UR-MCNC: 199.6 MG/DL
EGFRCR SERPLBLD CKD-EPI 2021: >90 ML/MIN/1.73M2
EOSINOPHIL # BLD AUTO: 0.26 10E3/UL (ref 0–0.7)
EOSINOPHIL NFR BLD AUTO: 3.8 %
ERYTHROCYTE [DISTWIDTH] IN BLOOD BY AUTOMATED COUNT: 12.2 % (ref 10–15)
GLUCOSE SERPL-MCNC: 92 MG/DL (ref 70–99)
HCO3 SERPL-SCNC: 24 MMOL/L (ref 22–29)
HCT VFR BLD AUTO: 40.3 % (ref 35–47)
HGB BLD-MCNC: 13.8 G/DL (ref 11.7–15.7)
IMM GRANULOCYTES # BLD: 0.03 10E3/UL
IMM GRANULOCYTES NFR BLD: 0.4 %
LYMPHOCYTES # BLD AUTO: 1.62 10E3/UL (ref 0.8–5.3)
LYMPHOCYTES NFR BLD AUTO: 23.8 %
MCH RBC QN AUTO: 29.8 PG (ref 26.5–33)
MCHC RBC AUTO-ENTMCNC: 34.2 G/DL (ref 31.5–36.5)
MCV RBC AUTO: 87 FL (ref 78–100)
MONOCYTES # BLD AUTO: 0.46 10E3/UL (ref 0–1.3)
MONOCYTES NFR BLD AUTO: 6.8 %
NEUTROPHILS # BLD AUTO: 4.39 10E3/UL (ref 1.6–8.3)
NEUTROPHILS NFR BLD AUTO: 64.6 %
NRBC # BLD AUTO: 0 10E3/UL
NRBC BLD AUTO-RTO: 0 /100
PLATELET # BLD AUTO: 393 10E3/UL (ref 150–450)
POTASSIUM SERPL-SCNC: 4.9 MMOL/L (ref 3.4–5.3)
PROT SERPL-MCNC: 8 G/DL (ref 6.4–8.3)
PROT/CREAT 24H UR: 0.1 MG/MG CR (ref 0–0.2)
RBC # BLD AUTO: 4.63 10E6/UL (ref 3.8–5.2)
SODIUM SERPL-SCNC: 141 MMOL/L (ref 135–145)
WBC # BLD AUTO: 6.8 10E3/UL (ref 4–11)

## 2025-08-12 PROCEDURE — 80053 COMPREHEN METABOLIC PANEL: CPT | Mod: ZL | Performed by: NURSE PRACTITIONER

## 2025-08-12 PROCEDURE — 87070 CULTURE OTHR SPECIMN AEROBIC: CPT | Mod: ZL | Performed by: NURSE PRACTITIONER

## 2025-08-12 PROCEDURE — 36415 COLL VENOUS BLD VENIPUNCTURE: CPT | Mod: ZL | Performed by: NURSE PRACTITIONER

## 2025-08-12 PROCEDURE — 84156 ASSAY OF PROTEIN URINE: CPT | Mod: ZL | Performed by: NURSE PRACTITIONER

## 2025-08-12 PROCEDURE — 85025 COMPLETE CBC W/AUTO DIFF WBC: CPT | Mod: ZL | Performed by: NURSE PRACTITIONER

## 2025-08-12 ASSESSMENT — PAIN SCALES - GENERAL: PAINLEVEL_OUTOF10: MODERATE PAIN (4)

## 2025-08-13 ENCOUNTER — HOSPITAL ENCOUNTER (OUTPATIENT)
Dept: ULTRASOUND IMAGING | Facility: OTHER | Age: 25
Discharge: HOME | End: 2025-08-13
Attending: NURSE PRACTITIONER
Payer: COMMERCIAL

## 2025-08-13 PROCEDURE — 76830 TRANSVAGINAL US NON-OB: CPT | Mod: 26 | Performed by: STUDENT IN AN ORGANIZED HEALTH CARE EDUCATION/TRAINING PROGRAM

## 2025-08-13 PROCEDURE — 76856 US EXAM PELVIC COMPLETE: CPT | Mod: 26 | Performed by: STUDENT IN AN ORGANIZED HEALTH CARE EDUCATION/TRAINING PROGRAM

## 2025-08-13 PROCEDURE — 76856 US EXAM PELVIC COMPLETE: CPT

## 2025-08-14 LAB
BACTERIA VAG AEROBE CULT: ABNORMAL
GRAM STAIN RESULT: ABNORMAL

## 2025-08-21 ENCOUNTER — LAB (OUTPATIENT)
Dept: LAB | Facility: OTHER | Age: 25
End: 2025-08-21
Attending: NURSE PRACTITIONER
Payer: COMMERCIAL

## 2025-08-21 ENCOUNTER — OFFICE VISIT (OUTPATIENT)
Dept: OBGYN | Facility: OTHER | Age: 25
End: 2025-08-21
Attending: NURSE PRACTITIONER
Payer: COMMERCIAL

## 2025-08-21 VITALS
SYSTOLIC BLOOD PRESSURE: 132 MMHG | WEIGHT: 239.3 LBS | DIASTOLIC BLOOD PRESSURE: 80 MMHG | TEMPERATURE: 97 F | BODY MASS INDEX: 39.82 KG/M2 | HEART RATE: 93 BPM | RESPIRATION RATE: 18 BRPM | OXYGEN SATURATION: 98 %

## 2025-08-21 DIAGNOSIS — R74.8 ELEVATED LIVER ENZYMES: ICD-10-CM

## 2025-08-21 DIAGNOSIS — N93.9 VAGINAL BLEEDING: ICD-10-CM

## 2025-08-21 DIAGNOSIS — O10.919 HTN IN PREGNANCY, CHRONIC: ICD-10-CM

## 2025-08-21 LAB
ALBUMIN SERPL BCG-MCNC: 4.2 G/DL (ref 3.5–5.2)
ALP SERPL-CCNC: 130 U/L (ref 40–150)
ALT SERPL W P-5'-P-CCNC: 75 U/L (ref 0–50)
ANION GAP SERPL CALCULATED.3IONS-SCNC: 14 MMOL/L (ref 7–15)
AST SERPL W P-5'-P-CCNC: 39 U/L (ref 0–45)
BACTERIAL VAGINOSIS VAG-IMP: NEGATIVE
BASOPHILS # BLD AUTO: 0.03 10E3/UL (ref 0–0.2)
BASOPHILS NFR BLD AUTO: 0.5 %
BILIRUB DIRECT SERPL-MCNC: 0.11 MG/DL (ref 0–0.3)
BILIRUB SERPL-MCNC: 0.4 MG/DL
BUN SERPL-MCNC: 13.7 MG/DL (ref 6–20)
CALCIUM SERPL-MCNC: 9.2 MG/DL (ref 8.8–10.4)
CANDIDA DNA VAG QL NAA+PROBE: NOT DETECTED
CANDIDA GLABRATA / CANDIDA KRUSEI DNA: NOT DETECTED
CHLORIDE SERPL-SCNC: 102 MMOL/L (ref 98–107)
CREAT SERPL-MCNC: 0.75 MG/DL (ref 0.51–0.95)
EGFRCR SERPLBLD CKD-EPI 2021: >90 ML/MIN/1.73M2
EOSINOPHIL # BLD AUTO: 0.19 10E3/UL (ref 0–0.7)
EOSINOPHIL NFR BLD AUTO: 2.9 %
ERYTHROCYTE [DISTWIDTH] IN BLOOD BY AUTOMATED COUNT: 11.9 % (ref 10–15)
GLUCOSE SERPL-MCNC: 92 MG/DL (ref 70–99)
HCO3 SERPL-SCNC: 24 MMOL/L (ref 22–29)
HCT VFR BLD AUTO: 38.1 % (ref 35–47)
HGB BLD-MCNC: 13.3 G/DL (ref 11.7–15.7)
IMM GRANULOCYTES # BLD: <0.03 10E3/UL
IMM GRANULOCYTES NFR BLD: 0.3 %
LYMPHOCYTES # BLD AUTO: 1.76 10E3/UL (ref 0.8–5.3)
LYMPHOCYTES NFR BLD AUTO: 26.5 %
MCH RBC QN AUTO: 30.4 PG (ref 26.5–33)
MCHC RBC AUTO-ENTMCNC: 34.9 G/DL (ref 31.5–36.5)
MCV RBC AUTO: 87.2 FL (ref 78–100)
MONOCYTES # BLD AUTO: 0.59 10E3/UL (ref 0–1.3)
MONOCYTES NFR BLD AUTO: 8.9 %
NEUTROPHILS # BLD AUTO: 4.04 10E3/UL (ref 1.6–8.3)
NEUTROPHILS NFR BLD AUTO: 60.9 %
NRBC # BLD AUTO: <0.03 10E3/UL
NRBC BLD AUTO-RTO: 0 /100
PLATELET # BLD AUTO: 349 10E3/UL (ref 150–450)
POTASSIUM SERPL-SCNC: 4.1 MMOL/L (ref 3.4–5.3)
PROT SERPL-MCNC: 7.8 G/DL (ref 6.4–8.3)
RBC # BLD AUTO: 4.37 10E6/UL (ref 3.8–5.2)
SODIUM SERPL-SCNC: 140 MMOL/L (ref 135–145)
T VAGINALIS DNA VAG QL NAA+PROBE: NOT DETECTED
WBC # BLD AUTO: 6.63 10E3/UL (ref 4–11)

## 2025-08-21 PROCEDURE — 81515 NFCT DS BV&VAGINITIS DNA ALG: CPT | Mod: ZL | Performed by: NURSE PRACTITIONER

## 2025-08-21 PROCEDURE — 82248 BILIRUBIN DIRECT: CPT | Mod: ZL

## 2025-08-21 PROCEDURE — 84155 ASSAY OF PROTEIN SERUM: CPT | Mod: ZL

## 2025-08-21 PROCEDURE — 36415 COLL VENOUS BLD VENIPUNCTURE: CPT | Mod: ZL | Performed by: NURSE PRACTITIONER

## 2025-08-21 PROCEDURE — 85004 AUTOMATED DIFF WBC COUNT: CPT | Mod: ZL | Performed by: NURSE PRACTITIONER

## 2025-08-21 PROCEDURE — 36415 COLL VENOUS BLD VENIPUNCTURE: CPT | Mod: ZL

## 2025-08-21 ASSESSMENT — PATIENT HEALTH QUESTIONNAIRE - PHQ9
SUM OF ALL RESPONSES TO PHQ QUESTIONS 1-9: 1
SUM OF ALL RESPONSES TO PHQ QUESTIONS 1-9: 1
10. IF YOU CHECKED OFF ANY PROBLEMS, HOW DIFFICULT HAVE THESE PROBLEMS MADE IT FOR YOU TO DO YOUR WORK, TAKE CARE OF THINGS AT HOME, OR GET ALONG WITH OTHER PEOPLE: NOT DIFFICULT AT ALL

## 2025-08-21 ASSESSMENT — PAIN SCALES - GENERAL: PAINLEVEL_OUTOF10: NO PAIN (0)

## 2025-09-04 ENCOUNTER — HOSPITAL ENCOUNTER (OUTPATIENT)
Dept: ULTRASOUND IMAGING | Facility: OTHER | Age: 25
End: 2025-09-04
Attending: NURSE PRACTITIONER
Payer: COMMERCIAL

## 2025-09-04 PROCEDURE — 76830 TRANSVAGINAL US NON-OB: CPT

## (undated) DEVICE — TRACKER PATIENT NON-INVASIVE AXIEM 9734887XOM

## (undated) DEVICE — SOL WATER IRRIG 1000ML BOTTLE 2F7114

## (undated) DEVICE — ESU GROUND PAD ADULT W/CORD E7507

## (undated) DEVICE — CANISTER SUCTION MEDI-VAC GUARDIAN 2000ML 90D 65651-220

## (undated) DEVICE — SINUS CATH BALLOON RELIEVA SPINPLUS FRONTAL RSP0616MFS

## (undated) DEVICE — PACK BASIN SET UP SUTCNBSBBA

## (undated) DEVICE — Device

## (undated) DEVICE — SU SILK 0 SH 30" K834H

## (undated) DEVICE — SOL NACL 0.9% INJ 1000ML BAG 2B1324X

## (undated) DEVICE — SU VICRYL 4-0 P-3 18" UND J494G

## (undated) DEVICE — GLOVE BIOGEL 6.5 LATEX

## (undated) DEVICE — BLADE 15 RB BK SS STRL LF DISPLF DISP 371215

## (undated) DEVICE — SET IRRIGATION CLEARVISION II TUBE DISPOSABLE 031229-01

## (undated) DEVICE — LABEL STERILE PREPRINTED FOR OR FRRH01-2M

## (undated) DEVICE — BIN-ENT BIN BN07

## (undated) DEVICE — GLOVE 6.5 PROTEXIS PI CLSC PF BD CUF STRL LF 12IN 2D72PL65X

## (undated) DEVICE — TUBING IRRIGATOR STRAIGHTSHOT XPS 1895522

## (undated) DEVICE — BLADE TURBINATE INFERIOR 2MM ROTATE  1882040HR

## (undated) DEVICE — PACK ENT CUSTOM SEN32ENMBI

## (undated) DEVICE — SOL NACL 0.9% IRRIG 1000ML BOTTLE 2F7124

## (undated) DEVICE — SPECIMEN BAG MEDIVAC SUCTION WHITE SOCK 65652-122

## (undated) DEVICE — SPONGE COTTONOID 1/2X1" 80-1402

## (undated) DEVICE — SLEEVE SCD EXPRESS KNEE LENGTH MED 9529

## (undated) DEVICE — COVER LT HANDLE 2/PK 5160-2FG

## (undated) DEVICE — TRACKER ENT OTS INSTRUMENT FUSION 9733533

## (undated) DEVICE — TUBING SUCTION 20FT N620A

## (undated) DEVICE — BLANKET BAIR HUGGER LOWER BODY 42568

## (undated) DEVICE — NASOPORE 4CM FIRM

## (undated) DEVICE — SU CHROMIC 4-0 RB-1 27" U203H

## (undated) DEVICE — BLADE QUADCUT ROTATABLE FUSION 4.3MMX13CM M4 1884380EM

## (undated) RX ORDER — KETOROLAC TROMETHAMINE 30 MG/ML
INJECTION, SOLUTION INTRAMUSCULAR; INTRAVENOUS
Status: DISPENSED
Start: 2023-06-22

## (undated) RX ORDER — ONDANSETRON 2 MG/ML
INJECTION INTRAMUSCULAR; INTRAVENOUS
Status: DISPENSED
Start: 2024-02-07

## (undated) RX ORDER — METOCLOPRAMIDE HYDROCHLORIDE 5 MG/ML
INJECTION INTRAMUSCULAR; INTRAVENOUS
Status: DISPENSED
Start: 2023-07-24

## (undated) RX ORDER — DEXAMETHASONE SODIUM PHOSPHATE 10 MG/ML
INJECTION, SOLUTION INTRAMUSCULAR; INTRAVENOUS
Status: DISPENSED
Start: 2023-10-31

## (undated) RX ORDER — DIPHENHYDRAMINE HYDROCHLORIDE 50 MG/ML
INJECTION INTRAMUSCULAR; INTRAVENOUS
Status: DISPENSED
Start: 2023-06-22

## (undated) RX ORDER — CYANOCOBALAMIN 1000 UG/ML
INJECTION, SOLUTION INTRAMUSCULAR; SUBCUTANEOUS
Status: DISPENSED
Start: 2023-06-22

## (undated) RX ORDER — ONDANSETRON 4 MG/1
TABLET, ORALLY DISINTEGRATING ORAL
Status: DISPENSED
Start: 2022-11-10

## (undated) RX ORDER — POTASSIUM CHLORIDE 1500 MG/1
TABLET, EXTENDED RELEASE ORAL
Status: DISPENSED
Start: 2023-07-24

## (undated) RX ORDER — PROPOFOL 10 MG/ML
INJECTION, EMULSION INTRAVENOUS
Status: DISPENSED
Start: 2023-10-31

## (undated) RX ORDER — KETOROLAC TROMETHAMINE 30 MG/ML
INJECTION, SOLUTION INTRAMUSCULAR; INTRAVENOUS
Status: DISPENSED
Start: 2024-02-07

## (undated) RX ORDER — DEXMEDETOMIDINE HYDROCHLORIDE 100 UG/ML
INJECTION, SOLUTION INTRAVENOUS
Status: DISPENSED
Start: 2023-10-31

## (undated) RX ORDER — ONDANSETRON 2 MG/ML
INJECTION INTRAMUSCULAR; INTRAVENOUS
Status: DISPENSED
Start: 2024-01-02

## (undated) RX ORDER — ONDANSETRON 2 MG/ML
INJECTION INTRAMUSCULAR; INTRAVENOUS
Status: DISPENSED
Start: 2023-10-31

## (undated) RX ORDER — LIDOCAINE HYDROCHLORIDE 10 MG/ML
INJECTION, SOLUTION EPIDURAL; INFILTRATION; INTRACAUDAL; PERINEURAL
Status: DISPENSED
Start: 2020-11-16

## (undated) RX ORDER — KETOROLAC TROMETHAMINE 30 MG/ML
INJECTION, SOLUTION INTRAMUSCULAR; INTRAVENOUS
Status: DISPENSED
Start: 2024-01-02

## (undated) RX ORDER — ONDANSETRON 2 MG/ML
INJECTION INTRAMUSCULAR; INTRAVENOUS
Status: DISPENSED
Start: 2023-07-24

## (undated) RX ORDER — FENTANYL CITRATE 50 UG/ML
INJECTION, SOLUTION INTRAMUSCULAR; INTRAVENOUS
Status: DISPENSED
Start: 2023-10-31

## (undated) RX ORDER — METOCLOPRAMIDE HYDROCHLORIDE 5 MG/ML
INJECTION INTRAMUSCULAR; INTRAVENOUS
Status: DISPENSED
Start: 2024-01-02